# Patient Record
Sex: MALE | Race: WHITE | NOT HISPANIC OR LATINO
[De-identification: names, ages, dates, MRNs, and addresses within clinical notes are randomized per-mention and may not be internally consistent; named-entity substitution may affect disease eponyms.]

---

## 2022-05-05 ENCOUNTER — NON-APPOINTMENT (OUTPATIENT)
Age: 82
End: 2022-05-05

## 2022-05-15 ENCOUNTER — NON-APPOINTMENT (OUTPATIENT)
Age: 82
End: 2022-05-15

## 2022-05-24 ENCOUNTER — NON-APPOINTMENT (OUTPATIENT)
Age: 82
End: 2022-05-24

## 2022-05-30 ENCOUNTER — NON-APPOINTMENT (OUTPATIENT)
Age: 82
End: 2022-05-30

## 2022-07-07 ENCOUNTER — TRANSCRIPTION ENCOUNTER (OUTPATIENT)
Age: 82
End: 2022-07-07

## 2022-07-07 ENCOUNTER — APPOINTMENT (OUTPATIENT)
Dept: PAIN MANAGEMENT | Facility: CLINIC | Age: 82
End: 2022-07-07

## 2022-07-07 VITALS
WEIGHT: 200 LBS | SYSTOLIC BLOOD PRESSURE: 132 MMHG | DIASTOLIC BLOOD PRESSURE: 76 MMHG | HEART RATE: 85 BPM | HEIGHT: 73 IN | BODY MASS INDEX: 26.51 KG/M2

## 2022-07-07 DIAGNOSIS — Z87.891 PERSONAL HISTORY OF NICOTINE DEPENDENCE: ICD-10-CM

## 2022-07-07 DIAGNOSIS — Z78.9 OTHER SPECIFIED HEALTH STATUS: ICD-10-CM

## 2022-07-07 DIAGNOSIS — Z86.79 PERSONAL HISTORY OF OTHER DISEASES OF THE CIRCULATORY SYSTEM: ICD-10-CM

## 2022-07-07 DIAGNOSIS — Z87.19 PERSONAL HISTORY OF OTHER DISEASES OF THE DIGESTIVE SYSTEM: ICD-10-CM

## 2022-07-07 DIAGNOSIS — Z87.39 PERSONAL HISTORY OF OTHER DISEASES OF THE MUSCULOSKELETAL SYSTEM AND CONNECTIVE TISSUE: ICD-10-CM

## 2022-07-07 DIAGNOSIS — Z85.9 PERSONAL HISTORY OF MALIGNANT NEOPLASM, UNSPECIFIED: ICD-10-CM

## 2022-07-07 DIAGNOSIS — N40.0 BENIGN PROSTATIC HYPERPLASIA WITHOUT LOWER URINARY TRACT SYMPMS: ICD-10-CM

## 2022-07-07 DIAGNOSIS — Z86.39 PERSONAL HISTORY OF OTHER ENDOCRINE, NUTRITIONAL AND METABOLIC DISEASE: ICD-10-CM

## 2022-07-07 PROBLEM — Z00.00 ENCOUNTER FOR PREVENTIVE HEALTH EXAMINATION: Status: ACTIVE | Noted: 2022-07-07

## 2022-07-07 PROCEDURE — 99213 OFFICE O/P EST LOW 20 MIN: CPT

## 2022-07-07 RX ORDER — GABAPENTIN 100 MG/1
100 CAPSULE ORAL
Qty: 60 | Refills: 0 | Status: ACTIVE | COMMUNITY
Start: 2022-07-07 | End: 1900-01-01

## 2022-07-07 RX ORDER — BACLOFEN 10 MG/1
10 TABLET ORAL TWICE DAILY
Qty: 28 | Refills: 0 | Status: ACTIVE | COMMUNITY
Start: 2022-07-07 | End: 1900-01-01

## 2022-07-07 NOTE — HISTORY OF PRESENT ILLNESS
[FreeTextEntry1] : HISTORY OF PRESENT ILLNESS: Mr. Hauser is a 81 year old male complaining of lower back pain. He states the pain is located on the right side and travels into his right lower extremity. He states the pain is present daily and worse with any sort of activity. He states at rest the pain is minimal.\par \par Of note, he recently underwent a ACDF in November of 2020 after he sustained a fall and was found to have a cord injury for which emergent ACDF was performed.\par \par Overall, the pain started after no inciting event. The patient has had this pain for 2 months. Patient describes the pain as severe. During the last month the pain has been nearly constant with symptoms worsening no typical pattern. Pain described as sharp. Patient has weakness in the lower extremities. Pain is increased with standing, walking, exercising. Pain is decreased with lying down, sitting, relaxing. Pain is not changed with coughing/sneezing and bowel movement. Bowel or bladder habits have not changed.\par \par TODAY: The reason for the visit is a revisit encounter regarding his lower back pain. Since his last visit, he continues to have lower back pain, primarily at the midline. He does have radiating pain into the right lower extremity.  He did undergo a right L5-S1 SNRI x2 followed by a caudal injections x2 in May.  With the injections, he noted very little relief of his pain,   20-30% relief.  He continues to have right-sided lower back pain radiating into the right lower extremity with stiffness and pain.  Pain is alleviated while sitting however it is quite bothersome with increased activities such as prolonged walking or standing.\par \par  Since he has completed four epidural injections, I will hold off any further injections.

## 2022-07-07 NOTE — DATA REVIEWED
[FreeTextEntry1] : MRI L spine 8/16/2021: Extruded disc herniation L1-2 anterolaterally on the left compressing the left lateral recess of the thecal sac and exiting left 2nd lumbar roots. Small disc herniation is seen within the right intervertebral foramen of L5-S1 compressing the exiting right 5th lumbar root within the foramen. Diffuse annular bulge and associated osteophytic ridge at L4-5 mildly compresses the thecal sac. Small diffuse annular bulge L3-4.\par \par X-ray of the lumbar spine taken on 07/13/2021 showed mild dextroscoliosis of the thoracolumbar junction increase in the slightly in severity. At T12-L1, L1-2 and L2-3 and L3-4 marked spondylosis observed. At L4-5, marked disc narrowing, marked spondylosis the marked facet arthropathy are observed. Slight anterior listhesis of L4 has appeared since early examination. At L5-S1, marked disc narrowing, marked spondylosis and mild facet arthropathy. No lumbar compression fractures identified. 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 Prostate seed implants noted.

## 2022-07-21 NOTE — ASSESSMENT
[FreeTextEntry1] : 82-year-old male presenting with lower back pain. He has trialed and failed physical therapy with no significant improvement. He also underwent a right L5-S1 SNRI x2 along with caudal injections x2 with no significant relief. At this time, we will hold off any further injections and will trial him on Gabapentin 100mg to be titrated 2 tabs QHS as well as baclofen 5mg 1-2 tabs QHS, 2 week supply. He will follow up in 4-6 weeks for reassessment  I have explained the findings to the patient and all questions have been answered.
no

## 2022-08-08 ENCOUNTER — APPOINTMENT (OUTPATIENT)
Dept: PAIN MANAGEMENT | Facility: CLINIC | Age: 82
End: 2022-08-08

## 2022-08-08 VITALS
DIASTOLIC BLOOD PRESSURE: 94 MMHG | HEART RATE: 82 BPM | HEIGHT: 73 IN | SYSTOLIC BLOOD PRESSURE: 154 MMHG | BODY MASS INDEX: 26.51 KG/M2 | WEIGHT: 200 LBS

## 2022-08-08 PROCEDURE — 99214 OFFICE O/P EST MOD 30 MIN: CPT

## 2022-08-08 NOTE — PHYSICAL EXAM
[de-identified] : Constitutional\par GENERAL APPEARANCE OF PATIENT IS WELL DEVELOPED, WELL NOURISHED, BODY HABITUS NORMAL, WELL GROOMED, NO DEFORMITIES NOTED. \par \par Head\par -          Atraumatic and Normocephalic \par \par Eyes, Nose, and Throat:\par -          External inspection of ears and nose are normal overall without scars, lesions, or masses noted\par -          Assessment of hearing is normal\par \par Neck\par -          Examination of neck shows no masses, overall appearance is normal, neck is symmetric, tracheal position is midline, no crepitus is noted\par -          Examination of thyroid shows no enlargement, tenderness or masses\par \par Respiratory\par -          Assessment of respiratory effort shows no intercostal retractions, no use of accessory muscles, unlabored breathing, and normal diaphragmatic movement.\par \par Cardiovascular\par -          Examination of extremities show no edema or varicosities\par \par Musculoskeletal\par -           Inspection and palpation of digits and nails shows no clubbing, cyanosis, nodules, drainage, fluctuance, petechiae\par \par 1)         Spine- tenderness over the bilateral sacroiliac joints. \par \par 2)         Neck- inspection and palpation shows no misalignment, asymmetry, crepitation, defects, tenderness, masses, effusions. ROM is normal without crepitation or contracture. No instability or subluxation or laxity is noted. No abnormal movements.\par \par 3)         RUE- inspection and palpation shows no misalignment, asymmetry, crepitation, defects, tenderness, masses, effusions. ROM is normal without crepitation or contracture. No instability or subluxation or laxity is noted. No abnormal movements.\par \par 4)         LUE-inspection and palpation shows no misalignment, asymmetry, crepitation, defects, tenderness, masses, effusions. ROM is normal without crepitation or contracture. No instability or subluxation or laxity is noted. No abnormal movements.\par \par 5)         RLE- inspection and palpation shows no misalignment, asymmetry, crepitation, defects, tenderness, masses, effusions. ROM is normal without crepitation or contracture. No instability or subluxation or laxity is noted. No abnormal movements.\par \par 6)         LLE-inspection and palpation shows no misalignment, asymmetry, crepitation, defects, tenderness, masses, effusions. ROM is normal without crepitation or contracture. No instability or subluxation or laxity is noted. No abnormal movements.\par \par Skin\par -           Inspection of skin and subcutaneous tissue shows no rashes, lesions or ulcers\par -           Palpation of skin and subcutaneous tissue shows no rashes, no indurations, subcutaneous nodules or tightening.\par \par  Abdomen\par -           Soft; Non-tender\par \par Neurologic\par -           CN 2-12 are grossly intact\par -           No sensory or motor deficits in the upper and lower extremities\par -           Adequate strength in upper and lower extremities\par \par Psychiatric\par -           Patients judgment and insight are intact\par -           Oriented to time, place and person\par -           Recent and remote memory intact.\par

## 2022-08-08 NOTE — DATA REVIEWED
[FreeTextEntry1] : MRI L spine 8/16/2021: Extruded disc herniation L1-2 anterolaterally on the left compressing the left lateral recess of the thecal sac and exiting left 2nd lumbar roots. Small disc herniation is seen within the right intervertebral foramen of L5-S1 compressing the exiting right 5th lumbar root within the foramen. Diffuse annular bulge and associated osteophytic ridge at L4-5 mildly compresses the thecal sac. Small diffuse annular bulge L3-4.\par \par X-ray of the lumbar spine taken on 07/13/2021 showed mild dextroscoliosis of the thoracolumbar junction increase in the slightly in severity. At T12-L1, L1-2 and L2-3 and L3-4 marked spondylosis observed. At L4-5, marked disc narrowing, marked spondylosis the marked facet arthropathy are observed. Slight anterior listhesis of L4 has appeared since early examination. At L5-S1, marked disc narrowing, marked spondylosis and mild facet arthropathy. No lumbar compression fractures identified. 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 Prostate seed implants noted.\par \par SOAPP: Scored a 0 , low risk.\par  \par [place] REGISTRY: Reviewed .\par  \par UDS: No data obtained today. \par  \par Medications that trigger a UDS: Benzodiazepines (Ativan, Xanax, Valium) etc, Barbiturates, Narcotics (Avinza, Butrans, hydrocodone, Codeine, Maru, Methadone, Morphine, MS Contin, Opana, oxycodone, Oxycontin, Suboxone etc), Pregabalin (Lyrica), Tramadol (Ultacet, Utram etc), Tapentadol, (Nucynta) and Elist Drugs (cocaine, THC, Etc.)\par  \par Risk factors: Bipolar Illness, positive for any an illicit drugs, history of any ETOH and drug abuse, any signs of diversion, Sharing Meds, selling meds. Non consistent [place] drug reporting and above 120meq of morphine\par  \par Low risk: Patient has combination of a low risk SOAP and no risk factors. UDS would be repeated randomly every quarter\par \par \par

## 2022-08-08 NOTE — ASSESSMENT
[FreeTextEntry1] : 82 year old male presenting with pain over his bilateral sacroiliac joints. He would like to hold off on any injections at this time. He will follow up in 2 months for reassessment. I have explained the findings to the patient and all questions have been answered.\par \par No medications were given at todays visit.\par \par No interventions ordered today.\par \par We encourage a home exercise program, stressing walking and strengthening of the core. We have recommended exercises such as the modified plank, Leonardo exercise, elliptical , recumbent bike, as well as shoulder griddle strengthening. We discussed recreational activities such as swimming, Hira Chi and yoga. Use things that heat like hot shower or icy heat before rehab and exercising and at the beginning of the day, and ice (ice in a bag never directly on the skin) after activity and at the end of the day.\par \par A total of 33 minutes was spent on this visit, reviewing previous notes/consultations/imaging, counseling the patient on appropriate biomechanics impacting the pain, ordering tests if needed, refilling meds if needed, and documenting the findings in the note.\par \par Entered by Brandy Titus, acting as scribe for Dr. Ignacio.\par  \par The documentation recorded by the scribe, in my presence, accurately reflects the service I personally performed, and the decisions made by me with my edits as appropriate.\par  \par Best Regards, \par Mandeep Ignacio MD \par Board Certified, Anesthesiology \par Board Certified, Pain Medicine\par \par

## 2022-08-08 NOTE — HISTORY OF PRESENT ILLNESS
[FreeTextEntry1] : HISTORY OF PRESENT ILLNESS: Mr. Hauser is a 81 year old male complaining of lower back pain. He states the pain is located on the right side and travels into his right lower extremity. He states the pain is present daily and worse with any sort of activity. He states at rest the pain is minimal.\par \par Of note, he recently underwent a ACDF in November of 2020 after he sustained a fall and was found to have a cord injury for which emergent ACDF was performed.\par \par Overall, the pain started after no inciting event. The patient has had this pain for 2 months. Patient describes the pain as severe. During the last month the pain has been nearly constant with symptoms worsening no typical pattern. Pain described as sharp. Patient has weakness in the lower extremities. Pain is increased with standing, walking, exercising. Pain is decreased with lying down, sitting, relaxing. Pain is not changed with coughing/sneezing and bowel movement. Bowel or bladder habits have not changed.\par \par TODAY: Since last visit, he continues with ongoing lower back pain. He states the pain is in the lower lumbar spine into the buttock area. He states the pain is worse with walking. He states he gets out of breathe during walking secondary to the pain. He states he has signfiicant stiffness in his quads when standing up or walking. He currently rates the pain at a 8/10 on the pain scale. He states the pain has been constant. He states this pain signficiantly limits his ADLS.\par \par

## 2022-08-08 NOTE — PHYSICAL EXAM
[de-identified] : Constitutional\par GENERAL APPEARANCE OF PATIENT IS WELL DEVELOPED, WELL NOURISHED, BODY HABITUS NORMAL, WELL GROOMED, NO DEFORMITIES NOTED. \par \par Head\par -          Atraumatic and Normocephalic \par \par Eyes, Nose, and Throat:\par -          External inspection of ears and nose are normal overall without scars, lesions, or masses noted\par -          Assessment of hearing is normal\par \par Neck\par -          Examination of neck shows no masses, overall appearance is normal, neck is symmetric, tracheal position is midline, no crepitus is noted\par -          Examination of thyroid shows no enlargement, tenderness or masses\par \par Respiratory\par -          Assessment of respiratory effort shows no intercostal retractions, no use of accessory muscles, unlabored breathing, and normal diaphragmatic movement.\par \par Cardiovascular\par -          Examination of extremities show no edema or varicosities\par \par Musculoskeletal\par -           Inspection and palpation of digits and nails shows no clubbing, cyanosis, nodules, drainage, fluctuance, petechiae\par \par 1)         Spine- tenderness over the bilateral sacroiliac joints. \par \par 2)         Neck- inspection and palpation shows no misalignment, asymmetry, crepitation, defects, tenderness, masses, effusions. ROM is normal without crepitation or contracture. No instability or subluxation or laxity is noted. No abnormal movements.\par \par 3)         RUE- inspection and palpation shows no misalignment, asymmetry, crepitation, defects, tenderness, masses, effusions. ROM is normal without crepitation or contracture. No instability or subluxation or laxity is noted. No abnormal movements.\par \par 4)         LUE-inspection and palpation shows no misalignment, asymmetry, crepitation, defects, tenderness, masses, effusions. ROM is normal without crepitation or contracture. No instability or subluxation or laxity is noted. No abnormal movements.\par \par 5)         RLE- inspection and palpation shows no misalignment, asymmetry, crepitation, defects, tenderness, masses, effusions. ROM is normal without crepitation or contracture. No instability or subluxation or laxity is noted. No abnormal movements.\par \par 6)         LLE-inspection and palpation shows no misalignment, asymmetry, crepitation, defects, tenderness, masses, effusions. ROM is normal without crepitation or contracture. No instability or subluxation or laxity is noted. No abnormal movements.\par \par Skin\par -           Inspection of skin and subcutaneous tissue shows no rashes, lesions or ulcers\par -           Palpation of skin and subcutaneous tissue shows no rashes, no indurations, subcutaneous nodules or tightening.\par \par  Abdomen\par -           Soft; Non-tender\par \par Neurologic\par -           CN 2-12 are grossly intact\par -           No sensory or motor deficits in the upper and lower extremities\par -           Adequate strength in upper and lower extremities\par \par Psychiatric\par -           Patients judgment and insight are intact\par -           Oriented to time, place and person\par -           Recent and remote memory intact.\par

## 2022-10-05 ENCOUNTER — NON-APPOINTMENT (OUTPATIENT)
Age: 82
End: 2022-10-05

## 2022-12-14 ENCOUNTER — APPOINTMENT (OUTPATIENT)
Dept: PAIN MANAGEMENT | Facility: CLINIC | Age: 82
End: 2022-12-14

## 2022-12-14 VITALS — BODY MASS INDEX: 26.51 KG/M2 | WEIGHT: 200 LBS | HEIGHT: 73 IN

## 2022-12-14 DIAGNOSIS — M47.816 SPONDYLOSIS W/OUT MYELOPATHY OR RADICULOPATHY, LUMBAR REGION: ICD-10-CM

## 2022-12-14 PROCEDURE — 99214 OFFICE O/P EST MOD 30 MIN: CPT

## 2022-12-14 NOTE — DISCUSSION/SUMMARY
[de-identified] : 82-year-old male with persistent lower back pain despite conservative treatments. He will undergo updated imaging studies including an MRI of the lumbar spine in addition to flexion-extension x-rays. He will see Dr. Nance afterwards. Patient will follow up at our office as needed for now.\par \par Clair Joshi PA-C\par Mandeep Ignacio MD\par

## 2022-12-14 NOTE — HISTORY OF PRESENT ILLNESS
[FreeTextEntry1] : HISTORY OF PRESENT ILLNESS: Mr. Hauser is an 82 year old male complaining of lower back pain. He states the pain is located on the right side and travels into his right lower extremity. He states the pain is present daily and worse with any sort of activity. He states at rest the pain is minimal.\par \par Of note, he recently underwent a ACDF in November of 2020 after he sustained a fall and was found to have a cord injury for which emergent ACDF was performed.\par \par Overall, the pain started after no inciting event. The patient has had this pain for 2 months. Patient describes the pain as severe. During the last month the pain has been nearly constant with symptoms worsening no typical pattern. Pain described as sharp. Patient has weakness in the lower extremities. Pain is increased with standing, walking, exercising. Pain is decreased with lying down, sitting, relaxing. Pain is not changed with coughing/sneezing and bowel movement. Bowel or bladder habits have not changed.\par \par TODAY: He presents for a revisit appointment. He continues with ongoing lower back pain. Pain is worse with walking.  When sitting, his pain resolves. He states he gets out of breathe when walking secondary to the pain. He has stiffness in his quads but denies true radiculopathy. He currently rates the pain at a 8/10 on the pain scale. He states this pain significantly limits his ADL's. He has trialed PT and epidural injections with minimal relief. He has taken a muscle relaxer in the past which did not help. He has seen a neurosurgeon in the past, but wishes to have a 2nd opinion. I will refer him to Dr. Nance.

## 2022-12-14 NOTE — PHYSICAL EXAM
[de-identified] : Constitutional\par GENERAL APPEARANCE OF PATIENT IS WELL DEVELOPED, WELL NOURISHED, BODY HABITUS NORMAL, WELL GROOMED, NO DEFORMITIES NOTED. \par \par Head\par -          Atraumatic and Normocephalic \par \par Eyes, Nose, and Throat:\par -          External inspection of ears and nose are normal overall without scars, lesions, or masses noted\par -          Assessment of hearing is normal\par \par Neck\par -          Examination of neck shows no masses, overall appearance is normal, neck is symmetric, tracheal position is midline, no crepitus is noted\par -          Examination of thyroid shows no enlargement, tenderness or masses\par \par Respiratory\par -          Assessment of respiratory effort shows no intercostal retractions, no use of accessory muscles, unlabored breathing, and normal diaphragmatic movement.\par \par Cardiovascular\par -          Examination of extremities show no edema or varicosities\par \par Musculoskeletal\par -           Inspection and palpation of digits and nails shows no clubbing, cyanosis, nodules, drainage, fluctuance, petechiae\par \par 1)         Spine- tenderness over the bilateral sacroiliac joints. \par \par 2)         Neck- inspection and palpation shows no misalignment, asymmetry, crepitation, defects, tenderness, masses, effusions. ROM is normal without crepitation or contracture. No instability or subluxation or laxity is noted. No abnormal movements.\par \par 3)         RUE- inspection and palpation shows no misalignment, asymmetry, crepitation, defects, tenderness, masses, effusions. ROM is normal without crepitation or contracture. No instability or subluxation or laxity is noted. No abnormal movements.\par \par 4)         LUE-inspection and palpation shows no misalignment, asymmetry, crepitation, defects, tenderness, masses, effusions. ROM is normal without crepitation or contracture. No instability or subluxation or laxity is noted. No abnormal movements.\par \par 5)         RLE- inspection and palpation shows no misalignment, asymmetry, crepitation, defects, tenderness, masses, effusions. ROM is normal without crepitation or contracture. No instability or subluxation or laxity is noted. No abnormal movements.\par \par 6)         LLE-inspection and palpation shows no misalignment, asymmetry, crepitation, defects, tenderness, masses, effusions. ROM is normal without crepitation or contracture. No instability or subluxation or laxity is noted. No abnormal movements.\par \par Skin\par -           Inspection of skin and subcutaneous tissue shows no rashes, lesions or ulcers\par -           Palpation of skin and subcutaneous tissue shows no rashes, no indurations, subcutaneous nodules or tightening.\par \par  Abdomen\par -           Soft; Non-tender\par \par Neurologic\par -           CN 2-12 are grossly intact\par -           No sensory or motor deficits in the upper and lower extremities\par -           Adequate strength in upper and lower extremities\par \par Psychiatric\par -           Patients judgment and insight are intact\par -           Oriented to time, place and person\par -           Recent and remote memory intact.\par

## 2022-12-15 ENCOUNTER — APPOINTMENT (OUTPATIENT)
Dept: RADIOLOGY | Facility: CLINIC | Age: 82
End: 2022-12-15

## 2022-12-15 ENCOUNTER — RESULT CHARGE (OUTPATIENT)
Age: 82
End: 2022-12-15

## 2022-12-20 ENCOUNTER — APPOINTMENT (OUTPATIENT)
Dept: MRI IMAGING | Facility: CLINIC | Age: 82
End: 2022-12-20

## 2022-12-20 PROCEDURE — 72148 MRI LUMBAR SPINE W/O DYE: CPT | Mod: MH

## 2023-01-30 ENCOUNTER — APPOINTMENT (OUTPATIENT)
Dept: NEUROSURGERY | Facility: CLINIC | Age: 83
End: 2023-01-30
Payer: MEDICARE

## 2023-01-30 DIAGNOSIS — M54.16 RADICULOPATHY, LUMBAR REGION: ICD-10-CM

## 2023-01-30 DIAGNOSIS — M54.50 LOW BACK PAIN, UNSPECIFIED: ICD-10-CM

## 2023-01-30 PROCEDURE — 99204 OFFICE O/P NEW MOD 45 MIN: CPT

## 2023-01-30 NOTE — DISCUSSION/SUMMARY
[de-identified] : Mr. Hauser is an 82 year old male complaining of lower back pain. He states the pain is located in the midline lumbar spine with radicular features into the anterior bilateral thighs. No relief of symptoms with physical therapy or injection therapy. He does see an outside Neurosurgeon in [place] and is here for a second opinion. I discussed with him at length about a unilateral approach from the right micro decompressive laminectomy L4-5, L5-S1 and possible L3-4 vs decompressive laminectomy at L4-5 and L5-S1 with interlaminar stabilization. He wishes to discuss this over with his family and return for follow-up if needed. He may also return to speak to his outside neurosurgeon. He may follow-back when ready.\par \par I, Markos Mckeon, attest that this documentation has been prepared under the direction and in the presence of Provider Rahul Nance MD\par  \par Thank you for allowing me to assist in the care of this patient. \par  \par Rahul Nance MD\par \par Board Certified , Neurosurgeon\par \par

## 2023-01-30 NOTE — PHYSICAL EXAM
[de-identified] : Slight Kyphotic Gait \par Positive shopping cart sign\par Pain upon palpation of the lumbar spine, L5-S1 level  [de-identified] : MRI L/S 12/20/22: Mild S scoliosis with rotary component and mild straightening of sagittal lordosis.\par Multilevel spondylosis with prominent prevertebral osteophytes at multiple levels as well as multiple small sacral Tarlov cysts.\par L5-S1 severe chronic disc degeneration, shallow central right central and right foraminal disc herniation impinging on the right S1 root and foraminal L5 roots. L4-5 right central focal disc herniation extrudes superiorly to the inferior endplate of L4 on the right.\par \par X-ray L/S 12/22: Lumbar spondylosis affects every disc space level of the lumbar spine. A mild dextroscoliosis of the upper lumbar\par spine is present. A slight degenerative retrolisthesis of L2 on L3 is seen.

## 2023-01-30 NOTE — HISTORY OF PRESENT ILLNESS
[de-identified] : Mr. Hauser is an 82 year old male complaining of lower back pain. He states the pain is located in the midline lumbar spine with radicular features into the anterior bilateral thighs.  He states the pain is present daily and worse with any sort of activity. He was referred for neurosurgical consult by our pain management office. Pain is worse with walking. When sitting, his pain resolves. He has trialed physical therapy without any relief as well as a lumbar epidural injection by pain management without any relief. He continues to complaint of midline lower back pain at the L5-S1 level when he stands and walks for long periods of time. He does walk with somewhat of a kyphotic posture. He notes anterior thigh tightness bilaterally when he walks and stands, along with bilateral feet paraesthesia in the lateral aspect. When he sits and lays down he denies pain. Patient denies urinary incontinence. History of prostate cancer. \par \par Of note, he recently underwent a ACDF in November of 2020 after he sustained a  fall and was found to have a cord injury for which emergent ACDF was performed. This was completed by a surgeon in [facility].

## 2023-06-29 ENCOUNTER — INPATIENT (INPATIENT)
Facility: HOSPITAL | Age: 83
LOS: 1 days | Discharge: HOME | DRG: 309 | End: 2023-07-01
Attending: INTERNAL MEDICINE | Admitting: PEDIATRICS
Payer: MEDICARE

## 2023-06-29 VITALS
OXYGEN SATURATION: 96 % | RESPIRATION RATE: 20 BRPM | TEMPERATURE: 98 F | DIASTOLIC BLOOD PRESSURE: 74 MMHG | SYSTOLIC BLOOD PRESSURE: 127 MMHG | HEART RATE: 89 BPM

## 2023-06-29 DIAGNOSIS — I47.1 SUPRAVENTRICULAR TACHYCARDIA: ICD-10-CM

## 2023-06-29 DIAGNOSIS — Z98.1 ARTHRODESIS STATUS: Chronic | ICD-10-CM

## 2023-06-29 LAB
ALBUMIN SERPL ELPH-MCNC: 3.9 G/DL — SIGNIFICANT CHANGE UP (ref 3.5–5.2)
ALP SERPL-CCNC: 104 U/L — SIGNIFICANT CHANGE UP (ref 30–115)
ALT FLD-CCNC: 23 U/L — SIGNIFICANT CHANGE UP (ref 0–41)
ANION GAP SERPL CALC-SCNC: 14 MMOL/L — SIGNIFICANT CHANGE UP (ref 7–14)
AST SERPL-CCNC: 40 U/L — SIGNIFICANT CHANGE UP (ref 0–41)
BASOPHILS # BLD AUTO: 0.02 K/UL — SIGNIFICANT CHANGE UP (ref 0–0.2)
BASOPHILS NFR BLD AUTO: 0.3 % — SIGNIFICANT CHANGE UP (ref 0–1)
BILIRUB SERPL-MCNC: 0.3 MG/DL — SIGNIFICANT CHANGE UP (ref 0.2–1.2)
BUN SERPL-MCNC: 17 MG/DL — SIGNIFICANT CHANGE UP (ref 10–20)
CALCIUM SERPL-MCNC: 9 MG/DL — SIGNIFICANT CHANGE UP (ref 8.4–10.5)
CHLORIDE SERPL-SCNC: 102 MMOL/L — SIGNIFICANT CHANGE UP (ref 98–110)
CO2 SERPL-SCNC: 19 MMOL/L — SIGNIFICANT CHANGE UP (ref 17–32)
CREAT SERPL-MCNC: 1.3 MG/DL — SIGNIFICANT CHANGE UP (ref 0.7–1.5)
EGFR: 55 ML/MIN/1.73M2 — LOW
EOSINOPHIL # BLD AUTO: 0.13 K/UL — SIGNIFICANT CHANGE UP (ref 0–0.7)
EOSINOPHIL NFR BLD AUTO: 2 % — SIGNIFICANT CHANGE UP (ref 0–8)
GLUCOSE SERPL-MCNC: 315 MG/DL — HIGH (ref 70–99)
HCT VFR BLD CALC: 36.2 % — LOW (ref 42–52)
HGB BLD-MCNC: 12.7 G/DL — LOW (ref 14–18)
IMM GRANULOCYTES NFR BLD AUTO: 0.8 % — HIGH (ref 0.1–0.3)
LYMPHOCYTES # BLD AUTO: 1.05 K/UL — LOW (ref 1.2–3.4)
LYMPHOCYTES # BLD AUTO: 16.3 % — LOW (ref 20.5–51.1)
MCHC RBC-ENTMCNC: 30.8 PG — SIGNIFICANT CHANGE UP (ref 27–31)
MCHC RBC-ENTMCNC: 35.1 G/DL — SIGNIFICANT CHANGE UP (ref 32–37)
MCV RBC AUTO: 87.9 FL — SIGNIFICANT CHANGE UP (ref 80–94)
MONOCYTES # BLD AUTO: 0.47 K/UL — SIGNIFICANT CHANGE UP (ref 0.1–0.6)
MONOCYTES NFR BLD AUTO: 7.3 % — SIGNIFICANT CHANGE UP (ref 1.7–9.3)
NEUTROPHILS # BLD AUTO: 4.73 K/UL — SIGNIFICANT CHANGE UP (ref 1.4–6.5)
NEUTROPHILS NFR BLD AUTO: 73.3 % — SIGNIFICANT CHANGE UP (ref 42.2–75.2)
NRBC # BLD: 0 /100 WBCS — SIGNIFICANT CHANGE UP (ref 0–0)
PLATELET # BLD AUTO: 143 K/UL — SIGNIFICANT CHANGE UP (ref 130–400)
PMV BLD: 12.2 FL — HIGH (ref 7.4–10.4)
POTASSIUM SERPL-MCNC: 5.8 MMOL/L — HIGH (ref 3.5–5)
POTASSIUM SERPL-SCNC: 5.8 MMOL/L — HIGH (ref 3.5–5)
PROT SERPL-MCNC: 5.8 G/DL — LOW (ref 6–8)
RBC # BLD: 4.12 M/UL — LOW (ref 4.7–6.1)
RBC # FLD: 13.1 % — SIGNIFICANT CHANGE UP (ref 11.5–14.5)
SODIUM SERPL-SCNC: 135 MMOL/L — SIGNIFICANT CHANGE UP (ref 135–146)
TROPONIN T SERPL-MCNC: 0.11 NG/ML — CRITICAL HIGH
WBC # BLD: 6.45 K/UL — SIGNIFICANT CHANGE UP (ref 4.8–10.8)
WBC # FLD AUTO: 6.45 K/UL — SIGNIFICANT CHANGE UP (ref 4.8–10.8)

## 2023-06-29 PROCEDURE — 83735 ASSAY OF MAGNESIUM: CPT

## 2023-06-29 PROCEDURE — 84484 ASSAY OF TROPONIN QUANT: CPT

## 2023-06-29 PROCEDURE — 36415 COLL VENOUS BLD VENIPUNCTURE: CPT

## 2023-06-29 PROCEDURE — 84439 ASSAY OF FREE THYROXINE: CPT

## 2023-06-29 PROCEDURE — 93010 ELECTROCARDIOGRAM REPORT: CPT

## 2023-06-29 PROCEDURE — 78452 HT MUSCLE IMAGE SPECT MULT: CPT

## 2023-06-29 PROCEDURE — A9500: CPT

## 2023-06-29 PROCEDURE — 93306 TTE W/DOPPLER COMPLETE: CPT

## 2023-06-29 PROCEDURE — 82962 GLUCOSE BLOOD TEST: CPT

## 2023-06-29 PROCEDURE — 85027 COMPLETE CBC AUTOMATED: CPT

## 2023-06-29 PROCEDURE — 93005 ELECTROCARDIOGRAM TRACING: CPT

## 2023-06-29 PROCEDURE — 71045 X-RAY EXAM CHEST 1 VIEW: CPT | Mod: 26

## 2023-06-29 PROCEDURE — 85025 COMPLETE CBC W/AUTO DIFF WBC: CPT

## 2023-06-29 PROCEDURE — 93017 CV STRESS TEST TRACING ONLY: CPT

## 2023-06-29 PROCEDURE — 84443 ASSAY THYROID STIM HORMONE: CPT

## 2023-06-29 PROCEDURE — 99285 EMERGENCY DEPT VISIT HI MDM: CPT | Mod: GC

## 2023-06-29 PROCEDURE — 80053 COMPREHEN METABOLIC PANEL: CPT

## 2023-06-29 PROCEDURE — 93010 ELECTROCARDIOGRAM REPORT: CPT | Mod: 77

## 2023-06-29 PROCEDURE — 71275 CT ANGIOGRAPHY CHEST: CPT

## 2023-06-29 PROCEDURE — 80048 BASIC METABOLIC PNL TOTAL CA: CPT

## 2023-06-29 PROCEDURE — 83036 HEMOGLOBIN GLYCOSYLATED A1C: CPT

## 2023-06-29 RX ORDER — PANTOPRAZOLE SODIUM 20 MG/1
40 TABLET, DELAYED RELEASE ORAL
Refills: 0 | Status: DISCONTINUED | OUTPATIENT
Start: 2023-06-29 | End: 2023-07-01

## 2023-06-29 RX ORDER — GLUCAGON INJECTION, SOLUTION 0.5 MG/.1ML
1 INJECTION, SOLUTION SUBCUTANEOUS ONCE
Refills: 0 | Status: DISCONTINUED | OUTPATIENT
Start: 2023-06-29 | End: 2023-07-01

## 2023-06-29 RX ORDER — METOPROLOL TARTRATE 50 MG
25 TABLET ORAL DAILY
Refills: 0 | Status: DISCONTINUED | OUTPATIENT
Start: 2023-06-29 | End: 2023-06-29

## 2023-06-29 RX ORDER — ACETAMINOPHEN 500 MG
650 TABLET ORAL EVERY 6 HOURS
Refills: 0 | Status: DISCONTINUED | OUTPATIENT
Start: 2023-06-29 | End: 2023-07-01

## 2023-06-29 RX ORDER — DEXTROSE 50 % IN WATER 50 %
25 SYRINGE (ML) INTRAVENOUS ONCE
Refills: 0 | Status: DISCONTINUED | OUTPATIENT
Start: 2023-06-29 | End: 2023-07-01

## 2023-06-29 RX ORDER — SODIUM CHLORIDE 9 MG/ML
1000 INJECTION, SOLUTION INTRAVENOUS
Refills: 0 | Status: DISCONTINUED | OUTPATIENT
Start: 2023-06-29 | End: 2023-07-01

## 2023-06-29 RX ORDER — LANOLIN ALCOHOL/MO/W.PET/CERES
3 CREAM (GRAM) TOPICAL AT BEDTIME
Refills: 0 | Status: DISCONTINUED | OUTPATIENT
Start: 2023-06-29 | End: 2023-07-01

## 2023-06-29 RX ORDER — TAMSULOSIN HYDROCHLORIDE 0.4 MG/1
0.4 CAPSULE ORAL
Refills: 0 | Status: DISCONTINUED | OUTPATIENT
Start: 2023-06-29 | End: 2023-07-01

## 2023-06-29 RX ORDER — INSULIN LISPRO 100/ML
VIAL (ML) SUBCUTANEOUS
Refills: 0 | Status: DISCONTINUED | OUTPATIENT
Start: 2023-06-29 | End: 2023-07-01

## 2023-06-29 RX ORDER — ENOXAPARIN SODIUM 100 MG/ML
40 INJECTION SUBCUTANEOUS EVERY 24 HOURS
Refills: 0 | Status: DISCONTINUED | OUTPATIENT
Start: 2023-06-29 | End: 2023-07-01

## 2023-06-29 RX ORDER — DEXTROSE 50 % IN WATER 50 %
12.5 SYRINGE (ML) INTRAVENOUS ONCE
Refills: 0 | Status: DISCONTINUED | OUTPATIENT
Start: 2023-06-29 | End: 2023-07-01

## 2023-06-29 RX ORDER — METOPROLOL TARTRATE 50 MG
25 TABLET ORAL
Refills: 0 | Status: DISCONTINUED | OUTPATIENT
Start: 2023-06-29 | End: 2023-07-01

## 2023-06-29 RX ORDER — DEXTROSE 50 % IN WATER 50 %
15 SYRINGE (ML) INTRAVENOUS ONCE
Refills: 0 | Status: DISCONTINUED | OUTPATIENT
Start: 2023-06-29 | End: 2023-07-01

## 2023-06-29 RX ORDER — ASPIRIN/CALCIUM CARB/MAGNESIUM 324 MG
81 TABLET ORAL DAILY
Refills: 0 | Status: DISCONTINUED | OUTPATIENT
Start: 2023-06-29 | End: 2023-07-01

## 2023-06-29 RX ORDER — ATORVASTATIN CALCIUM 80 MG/1
40 TABLET, FILM COATED ORAL AT BEDTIME
Refills: 0 | Status: DISCONTINUED | OUTPATIENT
Start: 2023-06-29 | End: 2023-07-01

## 2023-06-29 RX ADMIN — ATORVASTATIN CALCIUM 40 MILLIGRAM(S): 80 TABLET, FILM COATED ORAL at 22:48

## 2023-06-29 RX ADMIN — Medication 25 MILLIGRAM(S): at 19:15

## 2023-06-29 NOTE — H&P ADULT - NSHPPHYSICALEXAM_GEN_ALL_CORE
CONSTITUTIONAL: No acute distress, well-developed, well-groomed, AAOx3  PULMONARY: Clear to auscultation bilaterally; no wheezes, rales, or rhonchi  CARDIOVASCULAR: Regular rate and rhythm; no audible murmurs  GASTROINTESTINAL: Soft, non-tender, non-distended; bowel sounds present  MUSCULOSKELETAL: 2+ peripheral pulses; no LLE  NEUROLOGY: non-focal  SKIN: No rashes or lesions; warm and dry

## 2023-06-29 NOTE — H&P ADULT - HISTORY OF PRESENT ILLNESS
Case of 83-year-old male with PMHx of HTN, DM, DLD, Hx of  SVT follows with Dr. Motta presenting to the ED for evaluation of acute onset shortness of breath that began this morning.    Patient states he got out of bed and went to the bathroom and was having difficulty ambulating to bathroom secondary to shortness of breath. 911 called, pt in svt on ekg, give 6 and 12 adenosine, not converted, hypotensive, cardioverted with 100J and 100 ketamine. converted to sinus rhythm without complaints.    Otherwise denies any fever, chills, headache, changes in vision, cough, congestion, n/v/d, abd pain, constipation, urinary complaints, lower extremity pain/swelling. Case of 83-year-old male with PMHx of HTN, DM, DLD, GERD, prostate cancer (sp seed implant in 2008) Hx of  SVT follows with Dr. Motta presenting to the ED for evaluation of acute onset shortness of breath that began this morning.    Patient states he got out of bed and went to the bathroom and was having difficulty ambulating to bathroom secondary to shortness of breath at around 5am. He went back to sleep then woke up with the same complaint, 911 called.  No chest pain, no fall, no other complaints, fever, chills, headache, changes in vision, cough, congestion, n/v/d, abd pain, constipation, urinary complaints, lower extremity pain/swelling.    During transportation with EMT, he was in svt on ekg, given 6 and 12 adenosine, and did not convert, was  hypotensive, cardioverted with 100J and 100 ketamine. Converted to sinus rhythm without complaints.    No clear inciting event for such SVT. Case of 83-year-old male with PMHx of HTN, DM, DLD, GERD, prostate cancer (sp seed implant in 2008), pAfib, Hx of  SVT follows with Dr. Motta presenting to the ED for evaluation of acute onset shortness of breath that began this morning.    Patient states he got out of bed and went to the bathroom and was having difficulty ambulating to bathroom secondary to shortness of breath at around 5am. He went back to sleep then woke up with the same complaint, 911 called.  No chest pain, no fall, no other complaints, fever, chills, headache, changes in vision, cough, congestion, n/v/d, abd pain, constipation, urinary complaints, lower extremity pain/swelling.    During transportation with EMT, he was in svt on ekg, given 6 and 12 adenosine, and did not convert, was  hypotensive, cardioverted with 100J and 100 ketamine. Converted to sinus rhythm without complaints.    No clear inciting event for such SVT.

## 2023-06-29 NOTE — ED PROVIDER NOTE - OBJECTIVE STATEMENT
Patient is an 83-year-old male history of high blood pressure diabetes high cholesterol  not on any anticoagulation followed by Dr. Motta presenting to the ED for evaluation of acute onset shortness of breath that began this morning.  Patient states he got out of bed and went to the bathroom and was having difficulty ambulating to bathroom secondary to shortness of breath. 911 called, pt in svt on ekg, give 6 and 12 adenosine, not converted, hypotensive, cardioverted with 100J and 100 ketamine. convereted to sinus rhythm without complaints. Otherwise denies any fever, chills, headache, changes in vision, cough, congestion, n/v/d, abd pain, constipation, urinary complaints, lower extremity pain/swelling. Patient is an 83-year-old male history of high blood pressure diabetes high cholesterol svt followed by Dr. Motta presenting to the ED for evaluation of acute onset shortness of breath that began this morning.  Patient states he got out of bed and went to the bathroom and was having difficulty ambulating to bathroom secondary to shortness of breath. 911 called, pt in svt on ekg, give 6 and 12 adenosine, not converted, hypotensive, cardioverted with 100J and 100 ketamine. converted to sinus rhythm without complaints. Otherwise denies any fever, chills, headache, changes in vision, cough, congestion, n/v/d, abd pain, constipation, urinary complaints, lower extremity pain/swelling.

## 2023-06-29 NOTE — H&P ADULT - ASSESSMENT
Case of 83-year-old male with PMHx of HTN, DM, DLD, Hx of  SVT follows with Dr. Motta presenting to the ED for evaluation of acute onset shortness of breath that began this morning.    #SVT sp shock  - Patient found to have an SVT by EMT  - Sp Adenosine 6mg => 6mg => 12mg then became hypotensive, given ketamine and cardioverted  - EP consult      #Elevated troponin  - Likely due to the cardioversion  - No chest pain  - Repeat troponin    #HTN  - C/w    #DM  - No recent A1c, will order  - Hold home meds  - Insulin sliding scale for now    #DLD  - C/w     #DVT prophylaxis  #GI prophylaxis  #Activity  #Diet     Case of 83-year-old male with PMHx of HTN, DM, DLD, Hx of  SVT follows with Dr. Motta presenting to the ED for evaluation of acute onset shortness of breath that began this morning.    #SVT sp shock  #Hx of Afib  - Patient found to have an SVT by EMT  - Sp Adenosine 6mg => 12mg then became hypotensive, given ketamine and cardioverted  - No clear inciting event  - Patient says he has a history of A-fib, diagnosed > 15 years ago, was started on aspirin and metoprolol back then, no history of anticoagulation intake  - Currently in sinus rhythm with no symptoms  - C/w aspirin  - C/w metoprolol  - EP consult    #Elevated troponin  - Likely due to myocardial stunning after the cardioversion  - No chest pain, no ischemic changes on EKG  - Trend troponin    #Possible lung nodule  - CXR showing subcentimeter right basilar nodular densities, may represent calcified granulomas or lung nodules. Recommend chest CT follow-up.  - Can be done as OP    #HTN  - C/w metoprolol  - Hold enalapril due to elevated potassium (hemolyzed), may resume in AM if levels are acceptable    #DM  - No recent A1c, will order  - Hold home meds (metformin and glimepiride   - Insulin sliding scale for now    #DLD  - C/w statin    #Hx of prostate cancer  #BPH?  - Sp Palladium seeds implanted in 2008  - No chemo or radio  - c/w flomax 0.4 BID  - Fu OP    #GERD  - C/w protonix    #Hx of mechanical fall with surgical intervention: anterior discectomy with fusion of C4-C5-C6 in 2020  #Lumbar disc herniation  - Tylenol PRN  - No neurologic deficits  - Ambulates with no issues but has chronic pain on standing    #DVT prophylaxis: lovenox  #GI prophylaxis: protonix  #Activity: IAT  #Diet: DASH, diabetic       Case of 83-year-old male with PMHx of HTN, DM, DLD, Hx of  SVT follows with Dr. Motta presenting to the ED for evaluation of acute onset shortness of breath that began this morning.    #SVT sp shock  #Hx of Afib  - Patient found to have an SVT by EMT  - Sp Adenosine 6mg => 12mg then became hypotensive, given ketamine and cardioverted  - No clear inciting event  - Patient says he has a history of A-fib, diagnosed > 15 years ago, was started on aspirin and metoprolol back then, no history of anticoagulation intake  - Currently in sinus rhythm ( and RBBB with 1st degree AB block) with no symptoms  - C/w aspirin  - C/w metoprolol  - EP consult    #Elevated troponin  - Likely due to myocardial stunning after the cardioversion  - No chest pain, no ischemic changes on EKG  - Trend troponin    #Possible lung nodule  - CXR showing subcentimeter right basilar nodular densities, may represent calcified granulomas or lung nodules. Recommend chest CT follow-up.  - Can be done as OP    #HTN  - C/w metoprolol  - Hold enalapril due to elevated potassium (hemolyzed), may resume in AM if levels are acceptable    #DM  - No recent A1c, will order  - Hold home meds (metformin and glimepiride   - Insulin sliding scale for now    #DLD  - C/w statin    #Hx of prostate cancer  #BPH?  - Sp Palladium seeds implanted in 2008  - No chemo or radio  - c/w flomax 0.4 BID  - Fu OP    #GERD  - C/w protonix    #Hx of mechanical fall with surgical intervention: anterior discectomy with fusion of C4-C5-C6 in 2020  #Lumbar disc herniation  - Tylenol PRN  - No neurologic deficits  - Ambulates with no issues but has chronic pain on standing    #DVT prophylaxis: lovenox  #GI prophylaxis: protonix  #Activity: IAT  #Diet: DASH, diabetic       Case of 83-year-old male with PMHx of HTN, DM, DLD, Hx of  SVT follows with Dr. Motta presenting to the ED for evaluation of acute onset shortness of breath that began this morning.    #SVT sp shock  #Hx of Afib  - Patient found to have an SVT by EMT  - Sp Adenosine 6mg => 12mg then became hypotensive, given ketamine and cardioverted  - No clear inciting event  - Patient says he has a history of A-fib, diagnosed > 15 years ago, was started on aspirin and metoprolol back then, no history of anticoagulation intake  - Currently in sinus rhythm ( and RBBB with 1st degree AB block) with no symptoms  => Plan:  - C/w aspirin  - C/w metoprolol and increase to 25mg BID as part of suppressive therapy  - EP consult    #Elevated troponin  - Likely due to myocardial stunning after the cardioversion  - No chest pain, no ischemic changes on EKG  - Trend troponin    #Possible lung nodule  - CXR showing subcentimeter right basilar nodular densities, may represent calcified granulomas or lung nodules. Recommend chest CT follow-up.  - Can be done as OP    #HTN  - C/w metoprolol  - Hold enalapril due to elevated potassium (hemolyzed), may resume in AM if levels are acceptable    #DM  - No recent A1c, will order  - Hold home meds (metformin and glimepiride   - Insulin sliding scale for now    #DLD  - C/w statin    #Hx of prostate cancer  #BPH?  - Sp Palladium seeds implanted in 2008  - No chemo or radio  - c/w flomax 0.4 BID  - Fu OP    #GERD  - C/w protonix    #Hx of mechanical fall with surgical intervention: anterior discectomy with fusion of C4-C5-C6 in 2020  #Lumbar disc herniation  - Tylenol PRN  - No neurologic deficits  - Ambulates with no issues but has chronic pain on standing    #DVT prophylaxis: lovenox  #GI prophylaxis: protonix  #Activity: IAT  #Diet: DASH, diabetic       Case of 83-year-old male with PMHx of HTN, DM, DLD, Hx of  SVT follows with Dr. Motta presenting to the ED for evaluation of acute onset shortness of breath that began this morning.    #SVT sp shock  #Hx of Afib  - Patient found to have an SVT by EMT  - Sp Adenosine 6mg => 12mg then became hypotensive, given ketamine and cardioverted  - No clear inciting event  - Patient says he has a history of A-fib, diagnosed > 15 years ago, was started on aspirin and metoprolol back then, no history of anticoagulation intake  - Currently in sinus rhythm ( and RBBB with 1st degree AB block) with no symptoms  => Plan:  - C/w aspirin  - C/w metoprolol and increase to 25mg BID as part of suppressive therapy  - EP consult    #Elevated troponin  - Likely due to myocardial stunning after the cardioversion  - No chest pain, no ischemic changes on EKG  - Trend troponin    #Possible lung nodule  - CXR showing subcentimeter right basilar nodular densities, may represent calcified granulomas or lung nodules. Recommend chest CT follow-up.  - Can be done as OP    #Normocytic anemia  - No source of bleed  - Can get nutritional workup as OP    #CKD 3a (vs RONNI?)  - No previous levels  - Repeat Cr in AM  - Avoid nephrotoxic meds    #HTN  - C/w metoprolol  - Hold enalapril due to elevated potassium (hemolyzed), may resume in AM if levels are acceptable    #DM  - No recent A1c, will order  - Hold home meds (metformin and glimepiride   - Insulin sliding scale for now    #DLD  - C/w statin    #Hx of prostate cancer  #BPH?  - Sp Palladium seeds implanted in 2008  - No chemo or radio  - c/w flomax 0.4 BID  - Fu OP    #GERD  - C/w protonix    #Hx of mechanical fall with surgical intervention: anterior discectomy with fusion of C4-C5-C6 in 2020  #Lumbar disc herniation  - Tylenol PRN  - No neurologic deficits  - Ambulates with no issues but has chronic pain on standing    #DVT prophylaxis: lovenox  #GI prophylaxis: protonix  #Activity: IAT  #Diet: DASH, diabetic       Case of 83-year-old male with PMHx of HTN, DM, DLD, Hx of  SVT follows with Dr. Motta presenting to the ED for evaluation of acute onset shortness of breath that began this morning.    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 Recommend chest CT follow-up  - Ex-smoker, quit 45 years ago  - Can be done as OP    #Normocytic anemia  - Hb 12.7  - No source of bleed  - Can get nutritional workup as OP    #CKD 3a (vs RONNI?)  - No previous levels  - Repeat Cr in AM  - Avoid nephrotoxic meds    #HTN  - C/w metoprolol  - Hold enalapril due to elevated potassium (hemolyzed), may resume in AM if levels are acceptable    #DM  - No recent A1c, will order  - Hold home meds (metformin and glimepiride   - Insulin sliding scale for now    #DLD  - C/w statin    #Hx of prostate cancer  #BPH?  - Sp Palladium seeds implanted in 2008  - No chemo or radio  - c/w flomax 0.4 BID  - Fu OP    #GERD  - C/w protonix    #Hx of mechanical fall with surgical intervention: anterior discectomy with fusion of C4-C5-C6 in 2020  #Lumbar disc herniation  - Tylenol PRN  - No neurologic deficits  - Ambulates with no issues but has chronic pain on standing    #DVT prophylaxis: lovenox  #GI prophylaxis: protonix  #Activity: IAT  #Diet: DASH, diabetic       Case of 83-year-old male with PMHx of HTN, DM, DLD, Hx of  SVT follows with Dr. Motta presenting to the ED for evaluation of acute onset shortness of breath that began this morning.    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 Recommend chest CT follow-up  - Ex-smoker, quit 45 years ago  - Can be done as OP    #Normocytic anemia  - Hb 12.7  - No source of bleed  - Can get nutritional workup as OP    #CKD 3a (vs RONNI?)  - No previous levels  - Repeat Cr in AM  - Avoid nephrotoxic meds    #HTN  - C/w metoprolol  - Hold enalapril due to elevated potassium (hemolyzed), may resume in AM if levels are acceptable    #DM  - No recent A1c, will order  - Hold home meds (metformin and glimepiride   - Insulin sliding scale for now    #DLD  - C/w statin    #Hx of prostate cancer  #BPH?  - Sp Palladium seeds brachytherapy implanted in 2008  - No chemotherapy  - c/w flomax 0.4 BID  - Fu OP    #GERD  - C/w protonix    #Hx of mechanical fall with surgical intervention: anterior discectomy with fusion of C4-C5-C6 in 2020  #Lumbar disc herniation  - Tylenol PRN  - No neurologic deficits  - Ambulates with no issues but has chronic pain on standing    #DVT prophylaxis: lovenox  #GI prophylaxis: protonix  #Activity: IAT  #Diet: DASH, diabetic       Case of 83-year-old male with PMHx of HTN, DM, DLD, GERD, prostate cancer (sp seed implant in 2008), pAfib, Hx of  SVT follows with Dr. Motta presenting to the ED for evaluation of acute onset shortness of breath that began this morning.    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 Recommend chest CT follow-up  - Ex-smoker, quit 45 years ago  - Can be done as OP    #Normocytic anemia  - Hb 12.7  - No source of bleed  - Can get nutritional workup as OP    #CKD 3a (vs RONNI?)  - No previous levels  - Repeat Cr in AM  - Avoid nephrotoxic meds    #HTN  - C/w metoprolol  - Hold enalapril due to elevated potassium (hemolyzed), may resume in AM if levels are acceptable    #DM  - No recent A1c, will order  - Hold home meds (metformin and glimepiride   - Insulin sliding scale for now    #DLD  - C/w statin    #Hx of prostate cancer  #BPH?  - Sp Palladium seeds brachytherapy implanted in 2008  - No chemotherapy  - c/w flomax 0.4 BID  - Fu OP    #GERD  - C/w protonix    #Hx of mechanical fall with surgical intervention: anterior discectomy with fusion of C4-C5-C6 in 2020  #Lumbar disc herniation  - Tylenol PRN  - No neurologic deficits  - Ambulates with no issues but has chronic pain on standing    #DVT prophylaxis: lovenox  #GI prophylaxis: protonix  #Activity: IAT  #Diet: DASH, diabetic

## 2023-06-29 NOTE — ED ADULT NURSE REASSESSMENT NOTE - NS ED NURSE REASSESS COMMENT FT1
Received pt alert and orientedx4, family at bedside having conversation. Pt denies rapid heart rate, vital signs recorded, WNL. Pt is on RA at 97%, no respiratory distress. Safety in place.

## 2023-06-29 NOTE — H&P ADULT - NSICDXPASTMEDICALHX_GEN_ALL_CORE_FT
PAST MEDICAL HISTORY:  Diabetes mellitus     Hypertension     Paroxysmal atrial fibrillation     Prostate cancer

## 2023-06-29 NOTE — ED ADULT NURSE NOTE - OBJECTIVE STATEMENT
pt BIBEMS after feeling lightheaded, dizziness, nausea, HR on scene 220, adenosine given by EMS, BP was 70/40 then EMS cardioverted, given 100mg ketamine for pain. Pt currently AO4, sx resolved, denies CP, HR 79, NSR observed. cardiac monitoring continued.

## 2023-06-29 NOTE — ED PROVIDER NOTE - PROGRESS NOTE DETAILS
pk: pt resting comfortably in no acute distress, no complaints at this time. awaiting labs and cxr pk: labs and cxr reviewed, trop elevated to .11, will place in med tele for monitoring

## 2023-06-29 NOTE — H&P ADULT - NSHPLABSRESULTS_GEN_ALL_CORE
VITAL SIGNS: Last 24 Hours  T(C): 36.8 (29 Jun 2023 15:39), Max: 36.8 (29 Jun 2023 10:35)  T(F): 98.2 (29 Jun 2023 15:39), Max: 98.2 (29 Jun 2023 10:35)  HR: 67 (29 Jun 2023 15:39) (67 - 89)  BP: 122/69 (29 Jun 2023 15:39) (122/69 - 130/76)  BP(mean): --  RR: 18 (29 Jun 2023 15:39) (16 - 20)  SpO2: 98% (29 Jun 2023 15:39) (96% - 99%)    LABS:                        12.7   6.45  )-----------( 143      ( 29 Jun 2023 10:45 )             36.2     06-29    135  |  102  |  17  ----------------------------<  315<H>  5.8<H>   |  19  |  1.3    Ca    9.0      29 Jun 2023 10:45    TPro  5.8<L>  /  Alb  3.9  /  TBili  0.3  /  DBili  x   /  AST  40  /  ALT  23  /  AlkPhos  104  06-29      Urinalysis Basic - ( 29 Jun 2023 10:45 )    Color: x / Appearance: x / SG: x / pH: x  Gluc: 315 mg/dL / Ketone: x  / Bili: x / Urobili: x   Blood: x / Protein: x / Nitrite: x   Leuk Esterase: x / RBC: x / WBC x   Sq Epi: x / Non Sq Epi: x / Bacteria: x        Troponin T, Serum: 0.11 ng/mL *HH* (06-29-23 @ 10:45)

## 2023-06-29 NOTE — ED PROVIDER NOTE - CLINICAL SUMMARY MEDICAL DECISION MAKING FREE TEXT BOX
83-year-old male present to the ED for acute onset shortness of breath.  Patient was noted to be tachycardic in the field.  Given adenosine and cardioverted at 100 J.  Patient on evaluation here is resting comfortably.  Vitals reviewed by me noted to be within normal limits.  Physical exam is unremarkable.  EKG revealed first-degree AV block with right bundle branch block.  Labs reviewed by me, values noted to be within normal limits.  Unclear if patient's initial rhythm was SVT admit to telemetry.

## 2023-06-29 NOTE — H&P ADULT - NSHPSOCIALHISTORY_GEN_ALL_CORE
Ex-smoker (pipe), quit 45 years ago    Occasional alcohol drinker    Lives at home, ambulates without assisstance Ex-smoker (pipe), quit 45 years ago    Occasional alcohol drinker    Lives at home, ambulates without assistance

## 2023-06-29 NOTE — H&P ADULT - NSVTERISKASSESS_GEN_ALL_CORE FT
Medical Assessment Completed on: 29-Jun-2023 17:26 Detail Level: Detailed Render Post-Care Instructions In Note?: no Consent: The patient's consent was obtained including but not limited to risks of crusting, scabbing, blistering, scarring, darker or lighter pigmentary change, recurrence, incomplete removal and infection. Duration Of Freeze Thaw-Cycle (Seconds): 3 Aperture Size (Optional): A Number Of Freeze-Thaw Cycles: 2 freeze-thaw cycles Post-Care Instructions: I reviewed with the patient in detail post-care instructions. Patient is to wear sunprotection, and avoid picking at any of the treated lesions. Pt may apply Vaseline to crusted or scabbing areas.

## 2023-06-29 NOTE — H&P ADULT - ATTENDING COMMENTS
HPI:  Case of 83-year-old male with PMHx of HTN, DM, DLD, GERD, prostate cancer (sp seed implant in 2008) Hx of  SVT follows with Dr. Motta presenting to the ED for evaluation of acute onset shortness of breath that began this morning.    Patient states he got out of bed and went to the bathroom and was having difficulty ambulating to bathroom secondary to shortness of breath at around 5am. He went back to sleep then woke up with the same complaint, 911 called.  No chest pain, no fall, no other complaints, fever, chills, headache, changes in vision, cough, congestion, n/v/d, abd pain, constipation, urinary complaints, lower extremity pain/swelling.    During transportation with EMT, he was in svt on ekg, given 6 and 12 adenosine, and did not convert, was  hypotensive, cardioverted with 100J and 100 ketamine. Converted to sinus rhythm without complaints.    No clear inciting event for such SVT. (29 Jun 2023 16:09)    REVIEW OF SYSTEMS: see cc/HPI   CONSTITUTIONAL: No weakness, fevers or chills  EYES/ENT: No visual changes;  No vertigo or throat pain   NECK: No pain or stiffness  RESPIRATORY: No cough, wheezing, hemoptysis; No shortness of breath  CARDIOVASCULAR: No chest pain or palpitations  GASTROINTESTINAL: No abdominal or epigastric pain. No nausea, vomiting, or hematemesis; No diarrhea or constipation. No melena or hematochezia.  GENITOURINARY: No dysuria, frequency or hematuria  NEUROLOGICAL: No numbness or weakness  SKIN: No itching, rashes    Physical Exam:   General: WN/WD NAD  Neurology: A&Ox3, nonfocal, follows commands  Eyes: PERRLA/ EOMI  ENT/Neck: Neck supple, trachea midline, No JVD  Respiratory: CTA B/L, No wheezing, rales, rhonchi  CV: Normal rate regular rhythm, S1S2, no murmurs, rubs or gallops  Abdominal: Soft, NT, ND +BS,   Extremities: No edema, + peripheral pulses  Skin: No Rashes, Hematoma, Ecchymosis    A/p  SVT s/p DCCV   H/o A.fib ( paroxysmal)  Elevated trop s/p cardioversion   -admit to tele   -EP eval   -serial CE and EKG   -agree w/ BBlocker and A/c if recommended by EP   -ASA / statin     R basial nodular density   -CT chest / pulm eval - can be OP   -smoking cessation     DM type II   Dyslipidemia   - statin   -F/S monitoring and   Anemia - normocytic     CKD IIIa  - serial monitoring     H/o prostate cancer s/p RT/brachytherapy   -Flomax     GERD- PPI HPI:  Case of 83-year-old male with PMHx of HTN, DM, DLD, GERD, prostate cancer (sp seed implant in 2008) Hx of  SVT follows with Dr. Motta presenting to the ED for evaluation of acute onset shortness of breath that began this morning.    Patient states he got out of bed and went to the bathroom and was having difficulty ambulating to bathroom secondary to shortness of breath at around 5am. He went back to sleep then woke up with the same complaint, 911 called.  No chest pain, no fall, no other complaints, fever, chills, headache, changes in vision, cough, congestion, n/v/d, abd pain, constipation, urinary complaints, lower extremity pain/swelling.    During transportation with EMT, he was in svt on ekg, given 6 and 12 adenosine, and did not convert, was  hypotensive, cardioverted with 100J and 100 ketamine. Converted to sinus rhythm without complaints.    No clear inciting event for such SVT. (29 Jun 2023 16:09)    REVIEW OF SYSTEMS: see cc/HPI   CONSTITUTIONAL: No weakness, fevers or chills  EYES/ENT: No visual changes;  No vertigo or throat pain   NECK: No pain or stiffness  RESPIRATORY: No cough, wheezing, hemoptysis; (+) shortness of breath  CARDIOVASCULAR: No chest pain (+)  palpitations  GASTROINTESTINAL: No abdominal or epigastric pain. No nausea, vomiting, or hematemesis; No diarrhea or constipation. No melena or hematochezia.  GENITOURINARY: No dysuria, frequency or hematuria  NEUROLOGICAL: No numbness or weakness  SKIN: No itching, rashes    Physical Exam:   General: WN/WD NAD  Neurology: A&Ox3, nonfocal, follows commands  Eyes: PERRLA/ EOMI  ENT/Neck: Neck supple, trachea midline, No JVD  Respiratory: CTA B/L, No wheezing, rales, rhonchi  CV: Normal rate regular rhythm, S1S2, no murmurs, rubs or gallops  Abdominal: Soft, NT, ND +BS,   Extremities: No edema, + peripheral pulses  Skin: No Rashes, Hematoma, Ecchymosis    A/p  SVT s/p DCCV   H/o A.fib ( paroxysmal)  Elevated trop s/p cardioversion   -admit to tele   -EP eval   -serial CE and EKG   -agree w/ BBlocker and A/c if recommended by EP   -ASA / statin     R basial nodular density   -CT chest / pulm eval - can be OP   -smoking cessation     DM type II   Dyslipidemia   - statin   -F/S monitoring and   Anemia - normocytic     CKD IIIa  - serial monitoring     H/o prostate cancer s/p RT/brachytherapy   -Flomax     GERD- PPI    PATIENT SEEN by ATTENDING 6/29/23 ( note revised 6/30)

## 2023-06-30 LAB
A1C WITH ESTIMATED AVERAGE GLUCOSE RESULT: 7.4 % — HIGH (ref 4–5.6)
ALBUMIN SERPL ELPH-MCNC: 3.5 G/DL — SIGNIFICANT CHANGE UP (ref 3.5–5.2)
ALP SERPL-CCNC: 79 U/L — SIGNIFICANT CHANGE UP (ref 30–115)
ALT FLD-CCNC: 18 U/L — SIGNIFICANT CHANGE UP (ref 0–41)
ANION GAP SERPL CALC-SCNC: 11 MMOL/L — SIGNIFICANT CHANGE UP (ref 7–14)
ANION GAP SERPL CALC-SCNC: 11 MMOL/L — SIGNIFICANT CHANGE UP (ref 7–14)
AST SERPL-CCNC: 24 U/L — SIGNIFICANT CHANGE UP (ref 0–41)
BASOPHILS # BLD AUTO: 0.03 K/UL — SIGNIFICANT CHANGE UP (ref 0–0.2)
BASOPHILS NFR BLD AUTO: 0.4 % — SIGNIFICANT CHANGE UP (ref 0–1)
BILIRUB SERPL-MCNC: 0.6 MG/DL — SIGNIFICANT CHANGE UP (ref 0.2–1.2)
BUN SERPL-MCNC: 16 MG/DL — SIGNIFICANT CHANGE UP (ref 10–20)
BUN SERPL-MCNC: 17 MG/DL — SIGNIFICANT CHANGE UP (ref 10–20)
CALCIUM SERPL-MCNC: 8.5 MG/DL — SIGNIFICANT CHANGE UP (ref 8.4–10.5)
CALCIUM SERPL-MCNC: 9 MG/DL — SIGNIFICANT CHANGE UP (ref 8.4–10.5)
CHLORIDE SERPL-SCNC: 103 MMOL/L — SIGNIFICANT CHANGE UP (ref 98–110)
CHLORIDE SERPL-SCNC: 103 MMOL/L — SIGNIFICANT CHANGE UP (ref 98–110)
CO2 SERPL-SCNC: 22 MMOL/L — SIGNIFICANT CHANGE UP (ref 17–32)
CO2 SERPL-SCNC: 22 MMOL/L — SIGNIFICANT CHANGE UP (ref 17–32)
CREAT SERPL-MCNC: 0.9 MG/DL — SIGNIFICANT CHANGE UP (ref 0.7–1.5)
CREAT SERPL-MCNC: 1 MG/DL — SIGNIFICANT CHANGE UP (ref 0.7–1.5)
EGFR: 75 ML/MIN/1.73M2 — SIGNIFICANT CHANGE UP
EGFR: 85 ML/MIN/1.73M2 — SIGNIFICANT CHANGE UP
EOSINOPHIL # BLD AUTO: 0.3 K/UL — SIGNIFICANT CHANGE UP (ref 0–0.7)
EOSINOPHIL NFR BLD AUTO: 4.3 % — SIGNIFICANT CHANGE UP (ref 0–8)
ESTIMATED AVERAGE GLUCOSE: 166 MG/DL — HIGH (ref 68–114)
GLUCOSE BLDC GLUCOMTR-MCNC: 117 MG/DL — HIGH (ref 70–99)
GLUCOSE BLDC GLUCOMTR-MCNC: 163 MG/DL — HIGH (ref 70–99)
GLUCOSE BLDC GLUCOMTR-MCNC: 193 MG/DL — HIGH (ref 70–99)
GLUCOSE BLDC GLUCOMTR-MCNC: 236 MG/DL — HIGH (ref 70–99)
GLUCOSE BLDC GLUCOMTR-MCNC: 244 MG/DL — HIGH (ref 70–99)
GLUCOSE SERPL-MCNC: 104 MG/DL — HIGH (ref 70–99)
GLUCOSE SERPL-MCNC: 119 MG/DL — HIGH (ref 70–99)
HCT VFR BLD CALC: 34.3 % — LOW (ref 42–52)
HGB BLD-MCNC: 12 G/DL — LOW (ref 14–18)
IMM GRANULOCYTES NFR BLD AUTO: 0.4 % — HIGH (ref 0.1–0.3)
LYMPHOCYTES # BLD AUTO: 1.87 K/UL — SIGNIFICANT CHANGE UP (ref 1.2–3.4)
LYMPHOCYTES # BLD AUTO: 27.1 % — SIGNIFICANT CHANGE UP (ref 20.5–51.1)
MAGNESIUM SERPL-MCNC: 1.9 MG/DL — SIGNIFICANT CHANGE UP (ref 1.8–2.4)
MCHC RBC-ENTMCNC: 31 PG — SIGNIFICANT CHANGE UP (ref 27–31)
MCHC RBC-ENTMCNC: 35 G/DL — SIGNIFICANT CHANGE UP (ref 32–37)
MCV RBC AUTO: 88.6 FL — SIGNIFICANT CHANGE UP (ref 80–94)
MONOCYTES # BLD AUTO: 0.64 K/UL — HIGH (ref 0.1–0.6)
MONOCYTES NFR BLD AUTO: 9.3 % — SIGNIFICANT CHANGE UP (ref 1.7–9.3)
NEUTROPHILS # BLD AUTO: 4.03 K/UL — SIGNIFICANT CHANGE UP (ref 1.4–6.5)
NEUTROPHILS NFR BLD AUTO: 58.5 % — SIGNIFICANT CHANGE UP (ref 42.2–75.2)
NRBC # BLD: 0 /100 WBCS — SIGNIFICANT CHANGE UP (ref 0–0)
PLATELET # BLD AUTO: 131 K/UL — SIGNIFICANT CHANGE UP (ref 130–400)
PMV BLD: 11.3 FL — HIGH (ref 7.4–10.4)
POTASSIUM SERPL-MCNC: 3.9 MMOL/L — SIGNIFICANT CHANGE UP (ref 3.5–5)
POTASSIUM SERPL-MCNC: 4.2 MMOL/L — SIGNIFICANT CHANGE UP (ref 3.5–5)
POTASSIUM SERPL-SCNC: 3.9 MMOL/L — SIGNIFICANT CHANGE UP (ref 3.5–5)
POTASSIUM SERPL-SCNC: 4.2 MMOL/L — SIGNIFICANT CHANGE UP (ref 3.5–5)
PROT SERPL-MCNC: 5.2 G/DL — LOW (ref 6–8)
RBC # BLD: 3.87 M/UL — LOW (ref 4.7–6.1)
RBC # FLD: 13.2 % — SIGNIFICANT CHANGE UP (ref 11.5–14.5)
SODIUM SERPL-SCNC: 136 MMOL/L — SIGNIFICANT CHANGE UP (ref 135–146)
SODIUM SERPL-SCNC: 136 MMOL/L — SIGNIFICANT CHANGE UP (ref 135–146)
T4 FREE+ TSH PNL SERPL: 1.11 UIU/ML — SIGNIFICANT CHANGE UP (ref 0.27–4.2)
TROPONIN T SERPL-MCNC: 0.09 NG/ML — CRITICAL HIGH
TROPONIN T SERPL-MCNC: 0.12 NG/ML — CRITICAL HIGH
WBC # BLD: 6.9 K/UL — SIGNIFICANT CHANGE UP (ref 4.8–10.8)
WBC # FLD AUTO: 6.9 K/UL — SIGNIFICANT CHANGE UP (ref 4.8–10.8)

## 2023-06-30 PROCEDURE — 99232 SBSQ HOSP IP/OBS MODERATE 35: CPT

## 2023-06-30 PROCEDURE — 99223 1ST HOSP IP/OBS HIGH 75: CPT | Mod: 57,25

## 2023-06-30 PROCEDURE — 99222 1ST HOSP IP/OBS MODERATE 55: CPT

## 2023-06-30 PROCEDURE — 71275 CT ANGIOGRAPHY CHEST: CPT | Mod: 26

## 2023-06-30 PROCEDURE — 33285 INSJ SUBQ CAR RHYTHM MNTR: CPT

## 2023-06-30 PROCEDURE — 93306 TTE W/DOPPLER COMPLETE: CPT | Mod: 26

## 2023-06-30 RX ORDER — SODIUM ZIRCONIUM CYCLOSILICATE 10 G/10G
10 POWDER, FOR SUSPENSION ORAL ONCE
Refills: 0 | Status: COMPLETED | OUTPATIENT
Start: 2023-06-30 | End: 2023-07-01

## 2023-06-30 RX ORDER — REGADENOSON 0.08 MG/ML
0.4 INJECTION, SOLUTION INTRAVENOUS ONCE
Refills: 0 | Status: COMPLETED | OUTPATIENT
Start: 2023-06-30 | End: 2023-07-01

## 2023-06-30 RX ADMIN — ATORVASTATIN CALCIUM 40 MILLIGRAM(S): 80 TABLET, FILM COATED ORAL at 21:44

## 2023-06-30 RX ADMIN — Medication 25 MILLIGRAM(S): at 17:07

## 2023-06-30 RX ADMIN — Medication 25 MILLIGRAM(S): at 05:19

## 2023-06-30 RX ADMIN — Medication 1: at 08:44

## 2023-06-30 RX ADMIN — TAMSULOSIN HYDROCHLORIDE 0.4 MILLIGRAM(S): 0.4 CAPSULE ORAL at 05:18

## 2023-06-30 RX ADMIN — PANTOPRAZOLE SODIUM 40 MILLIGRAM(S): 20 TABLET, DELAYED RELEASE ORAL at 05:19

## 2023-06-30 RX ADMIN — Medication 2: at 11:42

## 2023-06-30 RX ADMIN — TAMSULOSIN HYDROCHLORIDE 0.4 MILLIGRAM(S): 0.4 CAPSULE ORAL at 17:07

## 2023-06-30 RX ADMIN — Medication 81 MILLIGRAM(S): at 11:41

## 2023-06-30 NOTE — CONSULT NOTE ADULT - SUBJECTIVE AND OBJECTIVE BOX
Outpt cardiologist:    Reason for Consult:     HISTORY OF PRESENT ILLNESS:  Case of 83-year-old male with PMHx of HTN, DM, DLD, GERD, prostate cancer (sp seed implant in ), pAfib, Hx of  SVT follows with Dr. Motta presenting to the ED for evaluation of acute onset shortness of breath that began this morning.    Patient states he got out of bed and went to the bathroom and was having difficulty ambulating to bathroom secondary to shortness of breath at around 5am. He went back to sleep then woke up with the same complaint, 911 called.  No chest pain, no fall, no other complaints, fever, chills, headache, changes in vision, cough, congestion, n/v/d, abd pain, constipation, urinary complaints, lower extremity pain/swelling.    During transportation with EMT, he was in svt on ekg, given 6 and 12 adenosine, and did not convert, was  hypotensive, cardioverted with 100J and 100 ketamine. Converted to sinus rhythm without complaints.    No clear inciting event for such SVT. (2023 16:09)      PAST MEDICAL & SURGICAL HISTORY  Diabetes mellitus    Hypertension    Paroxysmal atrial fibrillation    Prostate cancer    Status post cervical spinal fusion        FAMILY HISTORY:  FAMILY HISTORY:      SOCIAL HISTORY:  Social History:  Ex-smoker (pipe), quit 45 years ago    Occasional alcohol drinker    Lives at home, ambulates without assistance (2023 16:09)      ALLERGIES:  No Known Allergies      MEDICATIONS:  aspirin enteric coated 81 milliGRAM(s) Oral daily  atorvastatin 40 milliGRAM(s) Oral at bedtime  dextrose 5%. 1000 milliLiter(s) (100 mL/Hr) IV Continuous <Continuous>  dextrose 5%. 1000 milliLiter(s) (50 mL/Hr) IV Continuous <Continuous>  dextrose 50% Injectable 25 Gram(s) IV Push once  dextrose 50% Injectable 12.5 Gram(s) IV Push once  dextrose 50% Injectable 25 Gram(s) IV Push once  enoxaparin Injectable 40 milliGRAM(s) SubCutaneous every 24 hours  glucagon  Injectable 1 milliGRAM(s) IntraMuscular once  insulin lispro (ADMELOG) corrective regimen sliding scale   SubCutaneous three times a day before meals  metoprolol tartrate 25 milliGRAM(s) Oral two times a day  pantoprazole    Tablet 40 milliGRAM(s) Oral before breakfast  regadenoson Injectable 0.4 milliGRAM(s) IV Push once  tamsulosin 0.4 milliGRAM(s) Oral two times a day    PRN:  acetaminophen     Tablet .. 650 milliGRAM(s) Oral every 6 hours PRN  aluminum hydroxide/magnesium hydroxide/simethicone Suspension 30 milliLiter(s) Oral every 4 hours PRN  dextrose Oral Gel 15 Gram(s) Oral once PRN  melatonin 3 milliGRAM(s) Oral at bedtime PRN      HOME MEDICATIONS:  Home Medications:  aspirin 81 mg oral tablet: 1 tab(s) orally once a day (2023 17:16)  Coenzyme Q10 200 mg oral capsule: 200 tab(s) orally once a day (2023 17:16)  D3 25 mcg (1000 intl units) oral tablet: 1 tab(s) orally once a day (2023 17:16)  enalapril 2.5 mg oral tablet: 1 tab(s) orally once a day (2023 17:16)  Flomax 0.4 mg oral capsule: 1 tab(s) orally 2 times a day (2023 17:16)  glimepiride 1 mg oral tablet: 1 tab(s) orally 2 times a day (2023 17:16)  metFORMIN 1000 mg oral tablet: 1 tab(s) orally 2 times a day (2023 17:16)  metoprolol tartrate 25 mg oral tablet: 1 tab(s) orally once a day (2023 17:16)  Omega-3 Fish Oil 1000 mg oral capsule: 1 tab(s) orally 2 times a day (2023 17:16)  omeprazole 20 mg oral delayed release tablet: 1 tab(s) orally once a day (2023 17:16)  simvastatin 40 mg oral tablet: 1 tab(s) orally once a day (2023 17:16)      VITALS:   T(F): 98.1 ( @ 15:34), Max: 98.2 ( @ 10:35)  HR: 61 ( @ 15:34) (61 - 94)  BP: 107/68 ( @ 15:34) (107/68 - 147/78)  BP(mean): 90 ( @ 08:13) (90 - 90)  RR: 18 ( @ 15:34) (16 - 20)  SpO2: 99% ( @ 15:34) (96% - 100%)    I&O's Summary      REVIEW OF SYSTEMS:  CONSTITUTIONAL: No weakness, fevers or chills  HEENT: No visual changes, neck/ear pain  RESPIRATORY: No cough, sob  CARDIOVASCULAR: See HPI  GASTROINTESTINAL: No abdominal pain. No nausea, vomiting, diarrhea   GENITOURINARY: No dysuria, frequency or hematuria  NEUROLOGICAL: No new focal deficits  SKIN: No new rashes    PHYSICAL EXAM:  General: Not in distress.  Non-toxic appearing.   Cardio: regular, S1, S2, no murmur  Pulm: B/L BS.  No wheezing / crackles / rales  Abdomen: Soft, non-tender, non-distended. Normoactive bowel sounds  Extremities: No edema b/l le  Neuro: A&O x3. No focal deficits    LABS:                        12.0   6.90  )-----------( 131      ( 2023 05:36 )             34.3         136  |  103  |  16  ----------------------------<  119<H>  3.9   |  22  |  0.9    Ca    8.5      2023 05:36  Mg     1.9         TPro  5.2<L>  /  Alb  3.5  /  TBili  0.6  /  DBili  x   /  AST  24  /  ALT  18  /  AlkPhos  79        Troponin T, Serum: 0.09 ng/mL *HH* (23 @ 05:36)  Troponin T, Serum: 0.12 ng/mL *HH* (23 @ 00:30)    CARDIAC MARKERS ( 2023 05:36 )  x     / 0.09 ng/mL / x     / x     / x      CARDIAC MARKERS ( 2023 00:30 )  x     / 0.12 ng/mL / x     / x     / x      CARDIAC MARKERS ( 2023 10:45 )  x     / 0.11 ng/mL / x     / x     / x        IMAGING:  -TTE:  < from: TTE Echo Complete w/o Contrast w/ Doppler (23 @ 14:35) >  Summary:   1. LV Ejection Fraction by Nunes's Method with a biplane EF of 58 %.   2. Normal global left ventricular systolic function.   3. Normal left atrial size.   4. Normal right atrial size.      EC Lead ECG:   Ventricular Rate 68 BPM    Atrial Rate 68 BPM    P-R Interval 294 ms    QRS Duration 128 ms    Q-T Interval 414 ms    QTC Calculation(Bazett) 440 ms    P Axis 40 degrees    R Axis -15 degrees    T Axis 8 degrees    Diagnosis Line Sinus rhythm with 1st degree A-V block  Right bundle branch block  Minimal voltage criteria for LVH, may be normal variant  Abnormal ECG    Confirmed by Dale Monae ([id number]) on 2023 7:03:53 PM ( @ 16:30)      TELEMETRY EVENTS:

## 2023-06-30 NOTE — CONSULT NOTE ADULT - ATTENDING COMMENTS
Remote AF history (30-y ago)  No known recurrence / not anticoagulated    Comorbidities as above    Acute dyspnea and palpitations.  Reported SVT aborted with DCCV by EMS (no strips available for review).    Asymptomatic when evaluated  Hemodynamics stable  Minimal troponin elevation / minimal change.  Possibly small Type II NSTEMI in setting of tachycardia.    EKG: NSR, FAV, RBBB, LVH  ECHO: nL LVSF.  No significant valve disease.    Had 1st part of NST.    Essentially, arrhythmic event of unclear etiology.  [id number] = 4 (anticoagulation indicated if AF).  Elevated risk for CAD    - Continue telemetry.  - Agree with ILR given potential anticoagulation indication.  - Complete MPI / further CAD evaluation pending results  - Continue Metoprolol / monitor prolonger AR on EKG

## 2023-06-30 NOTE — CONSULT NOTE ADULT - SUBJECTIVE AND OBJECTIVE BOX
Patient is a 83y old  Male who presents with a chief complaint of palpitations and shortness of breath    HPI: 83-year-old male with PMHx of HTN, DM, DLD, GERD, prostate cancer (status post seed implant in 2008), c-spine fusion, paroxysmal atrial fibrillation not on anticoagulation (unknown reason) follows with Dr. Haider for cardiology, primary care physician Veto Prather MD  presented to the ED for evaluation of acute onset shortness of breath that began this morning for which EMS was called. During transportation with EMT noted the patient to have tachycardia on ekg for which he was given 6 and 12 adenosine which did not convert his rhythm but he became hypotensive and was cardioverted with 100J and 100 ketamine and converted to sinus rhythm. Electrophysiology was consulted for further recommendations. The patient was seen and evaluated. There is no echocardiogram and no recent ischemic work up. He states to have been diagnosed with atrial fibrillation back in 1994 however was never initiated on anticoagulation and nothing was mentioned further about it on follow ups according to the patient. 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 There is no documentation of the event that took place by EMS so we are unable to determine what arrythmia the patient had at the time.       PAST MEDICAL & SURGICAL HISTORY:  Diabetes mellitus  Hypertension  Paroxysmal atrial fibrillation  Prostate cancer  Status post cervical spinal fusion    PREVIOUS DIAGNOSTIC TESTING:      ECHO  FINDINGS: None    STRESS  FINDINGS: None    CATHETERIZATION  FINDINGS: None     ELECTROPHYSIOLOGY STUDY  FINDINGS: None    MEDICATIONS  (STANDING):  aspirin enteric coated 81 milliGRAM(s) Oral daily  atorvastatin 40 milliGRAM(s) Oral at bedtime  dextrose 5%. 1000 milliLiter(s) (100 mL/Hr) IV Continuous <Continuous>  dextrose 5%. 1000 milliLiter(s) (50 mL/Hr) IV Continuous <Continuous>  dextrose 50% Injectable 25 Gram(s) IV Push once  dextrose 50% Injectable 12.5 Gram(s) IV Push once  dextrose 50% Injectable 25 Gram(s) IV Push once  enoxaparin Injectable 40 milliGRAM(s) SubCutaneous every 24 hours  glucagon  Injectable 1 milliGRAM(s) IntraMuscular once  insulin lispro (ADMELOG) corrective regimen sliding scale   SubCutaneous three times a day before meals  metoprolol tartrate 25 milliGRAM(s) Oral two times a day  pantoprazole    Tablet 40 milliGRAM(s) Oral before breakfast  tamsulosin 0.4 milliGRAM(s) Oral two times a day    MEDICATIONS  (PRN):  acetaminophen     Tablet .. 650 milliGRAM(s) Oral every 6 hours PRN Mild Pain (1 - 3)  aluminum hydroxide/magnesium hydroxide/simethicone Suspension 30 milliLiter(s) Oral every 4 hours PRN Dyspepsia  dextrose Oral Gel 15 Gram(s) Oral once PRN Blood Glucose LESS THAN 70 milliGRAM(s)/deciliter  melatonin 3 milliGRAM(s) Oral at bedtime PRN Insomnia    FAMILY HISTORY: No history of sudden death    SOCIAL HISTORY: Lives at home    CIGARETTES: former pipe smoker quit 40 years ago    ALCOHOL: Former social     Past Surgical History: as above    Allergies: No Known Allergies      REVIEW OF SYSTEMS:  CONSTITUTIONAL: No fever, weight loss, chills, shakes, or fatigue  EYES: No eye pain, visual disturbances, or discharge  ENMT:  No difficulty hearing, tinnitus, vertigo; No sinus or throat pain  NECK: No pain or stiffness  BREASTS: No pain, masses, or nipple discharge  RESPIRATORY: No cough, wheezing, hemoptysis, (+) shortness of breath  CARDIOVASCULAR: No chest pain, dyspnea, (+) palpitations, dizziness, syncope, paroxysmal nocturnal dyspnea, orthopnea, or arm or leg swelling  GASTROINTESTINAL: No abdominal  or epigastric pain, nausea, vomiting, hematemesis, diarrhea, constipation, melena or bright red blood.  GENITOURINARY: No dysuria, nocturia, hematuria, or urinary incontinence  NEUROLOGICAL: No headaches, memory loss, slurred speech, limb weakness, loss of strength, numbness, or tremors  SKIN: No itching, burning, rashes, or lesions   LYMPH NODES: No enlarged glands  ENDOCRINE: No heat or cold intolerance, or hair loss  MUSCULOSKELETAL: No joint pain or swelling, muscle, back, or extremity pain  PSYCHIATRIC: No depression, anxiety, or difficulty sleeping  HEME/LYMPH: No easy bruising or bleeding gums  ALLERY AND IMMUNOLOGIC: No hives or rash.      Vital Signs Last 24 Hrs  T(C): 36.7 (30 Jun 2023 08:13), Max: 36.8 (29 Jun 2023 10:35)  T(F): 98.1 (30 Jun 2023 08:13), Max: 98.2 (29 Jun 2023 10:35)  HR: 61 (30 Jun 2023 08:13) (61 - 94)  BP: 117/72 (30 Jun 2023 08:13) (111/65 - 147/78)  BP(mean): 90 (30 Jun 2023 08:13) (90 - 90)  RR: 18 (30 Jun 2023 08:13) (16 - 20)  SpO2: 97% (30 Jun 2023 08:13) (96% - 100%)    Parameters below as of 30 Jun 2023 05:16  Patient On (Oxygen Delivery Method): room air    PHYSICAL EXAM:  GENERAL: In no apparent distress, well nourished, and hydrated.  HEAD:  Atraumatic, Normocephalic  EYES: EOMI, PERRLA, conjunctiva and sclera clear  ENMT: No tonsillar erythema, exudates, or enlargements; ist mucous membranes, Good dentition, No lesions  NECK: Supple and normal thyroid.  No JVD or carotid bruit.  Carotid pulse is 2+ bilaterally.  HEART: Regular rate and rhythm; No murmurs, rubs, or gallops.  PULMONARY: Clear to auscultation and perfusion.  No rales, wheezing, or rhonchi bilaterally.  ABDOMEN: Soft, Nontender, Nondistended; Bowel sounds present  EXTREMITIES:  2+ Peripheral Pulses, No clubbing, cyanosis, or edema  LYMPH: No lymphadenopathy noted  NEUROLOGICAL: Grossly nonfocal      INTERPRETATION OF TELEMETRY: sinus rhythm     ECG: Rhythm - Normal sinus, Rate - 68bpm, AV Conduction- 294ms, Interventricular conduction - right bundle branch block, QRS duration - 128ms, Hypertrophy - Absent, Myocardial Infarction - Absent, QT interval - 414ms, WPW - Absent, Brugada pattern - Absent.     I&O's Detail      LABS:                        12.0   6.90  )-----------( 131      ( 30 Jun 2023 05:36 )             34.3     06-30    136  |  103  |  16  ----------------------------<  119<H>  3.9   |  22  |  0.9    Ca    8.5      30 Jun 2023 05:36  Mg     1.9     06-30    TPro  5.2<L>  /  Alb  3.5  /  TBili  0.6  /  DBili  x   /  AST  24  /  ALT  18  /  AlkPhos  79  06-30    CARDIAC MARKERS ( 30 Jun 2023 05:36 )  x     / 0.09 ng/mL / x     / x     / x      CARDIAC MARKERS ( 30 Jun 2023 00:30 )  x     / 0.12 ng/mL / x     / x     / x      CARDIAC MARKERS ( 29 Jun 2023 10:45 )  x     / 0.11 ng/mL / x     / x     / x            Urinalysis Basic - ( 30 Jun 2023 05:36 )    Color: x / Appearance: x / SG: x / pH: x  Gluc: 119 mg/dL / Ketone: x  / Bili: x / Urobili: x   Blood: x / Protein: x / Nitrite: x   Leuk Esterase: x / RBC: x / WBC x   Sq Epi: x / Non Sq Epi: x / Bacteria: x      BNP  I&O's Detail    Daily     Daily     RADIOLOGY & ADDITIONAL STUDIES: Patient is a 83y old  Male who presents with a chief complaint of palpitations and shortness of breath    HPI: 83-year-old male with PMHx of HTN, DM, DLD, GERD, prostate cancer (status post seed implant in 2008), c-spine fusion, paroxysmal atrial fibrillation not on anticoagulation (unknown reason) follows with Dr. Haider for cardiology, primary care physician Veto Prather MD  presented to the ED for evaluation of acute onset shortness of breath that began this morning for which EMS was called. During transportation with EMT noted the patient to have tachycardia on ekg for which he was given 6 and 12 adenosine which did not convert his rhythm but he became hypotensive and was cardioverted with 100J and 100 ketamine and converted to sinus rhythm. Electrophysiology was consulted for further recommendations. The patient was seen and evaluated. There is no echocardiogram and no recent ischemic work up. He states to have been diagnosed with atrial fibrillation back in 1994 however was never initiated on anticoagulation and nothing was mentioned further about it on follow ups according to the patient. 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 There is no documentation of the event that took place by EMS so we are unable to determine what arrythmia the patient had at the time.       PAST MEDICAL & SURGICAL HISTORY:  Diabetes mellitus  Hypertension  Paroxysmal atrial fibrillation  Prostate cancer  Status post cervical spinal fusion    PREVIOUS DIAGNOSTIC TESTING:      ECHO  FINDINGS: None    STRESS  FINDINGS: None    CATHETERIZATION  FINDINGS: None     ELECTROPHYSIOLOGY STUDY  FINDINGS: None    MEDICATIONS  (STANDING):  aspirin enteric coated 81 milliGRAM(s) Oral daily  atorvastatin 40 milliGRAM(s) Oral at bedtime  dextrose 5%. 1000 milliLiter(s) (100 mL/Hr) IV Continuous <Continuous>  dextrose 5%. 1000 milliLiter(s) (50 mL/Hr) IV Continuous <Continuous>  dextrose 50% Injectable 25 Gram(s) IV Push once  dextrose 50% Injectable 12.5 Gram(s) IV Push once  dextrose 50% Injectable 25 Gram(s) IV Push once  enoxaparin Injectable 40 milliGRAM(s) SubCutaneous every 24 hours  glucagon  Injectable 1 milliGRAM(s) IntraMuscular once  insulin lispro (ADMELOG) corrective regimen sliding scale   SubCutaneous three times a day before meals  metoprolol tartrate 25 milliGRAM(s) Oral two times a day  pantoprazole    Tablet 40 milliGRAM(s) Oral before breakfast  tamsulosin 0.4 milliGRAM(s) Oral two times a day    MEDICATIONS  (PRN):  acetaminophen     Tablet .. 650 milliGRAM(s) Oral every 6 hours PRN Mild Pain (1 - 3)  aluminum hydroxide/magnesium hydroxide/simethicone Suspension 30 milliLiter(s) Oral every 4 hours PRN Dyspepsia  dextrose Oral Gel 15 Gram(s) Oral once PRN Blood Glucose LESS THAN 70 milliGRAM(s)/deciliter  melatonin 3 milliGRAM(s) Oral at bedtime PRN Insomnia    FAMILY HISTORY: No history of sudden death    SOCIAL HISTORY: Lives at home    CIGARETTES: former pipe smoker quit 40 years ago    ALCOHOL: Former social     Past Surgical History: as above    Allergies: No Known Allergies      REVIEW OF SYSTEMS:  CONSTITUTIONAL: No fever, weight loss, chills, shakes, or fatigue  EYES: No eye pain, visual disturbances, or discharge  ENMT:  No difficulty hearing, tinnitus, vertigo; No sinus or throat pain  NECK: No pain or stiffness  BREASTS: No pain, masses, or nipple discharge  RESPIRATORY: No cough, wheezing, hemoptysis, (+) shortness of breath  CARDIOVASCULAR: No chest pain, dyspnea, (+) palpitations, dizziness, syncope, paroxysmal nocturnal dyspnea, orthopnea, or arm or leg swelling  GASTROINTESTINAL: No abdominal  or epigastric pain, nausea, vomiting, hematemesis, diarrhea, constipation, melena or bright red blood.  GENITOURINARY: No dysuria, nocturia, hematuria, or urinary incontinence  NEUROLOGICAL: No headaches, memory loss, slurred speech, limb weakness, loss of strength, numbness, or tremors  SKIN: No itching, burning, rashes, or lesions   LYMPH NODES: No enlarged glands  ENDOCRINE: No heat or cold intolerance, or hair loss  MUSCULOSKELETAL: No joint pain or swelling, muscle, back, or extremity pain  PSYCHIATRIC: No depression, anxiety, or difficulty sleeping  HEME/LYMPH: No easy bruising or bleeding gums  ALLERY AND IMMUNOLOGIC: No hives or rash.      Vital Signs Last 24 Hrs  T(C): 36.7 (30 Jun 2023 08:13), Max: 36.8 (29 Jun 2023 10:35)  T(F): 98.1 (30 Jun 2023 08:13), Max: 98.2 (29 Jun 2023 10:35)  HR: 61 (30 Jun 2023 08:13) (61 - 94)  BP: 117/72 (30 Jun 2023 08:13) (111/65 - 147/78)  BP(mean): 90 (30 Jun 2023 08:13) (90 - 90)  RR: 18 (30 Jun 2023 08:13) (16 - 20)  SpO2: 97% (30 Jun 2023 08:13) (96% - 100%)    Parameters below as of 30 Jun 2023 05:16  Patient On (Oxygen Delivery Method): room air    PHYSICAL EXAM:  GENERAL: In no apparent distress, well nourished, and hydrated.  HEAD:  Atraumatic, Normocephalic  EYES: EOMI, PERRLA, conjunctiva and sclera clear  ENMT: MMM  NECK: Supple and normal thyroid.  No JVD or carotid bruit.  Carotid pulse is 2+ bilaterally.  HEART: Regular rate and rhythm; No murmurs, rubs, or gallops.  PULMONARY: Clear to auscultation and perfusion.  No rales, wheezing, or rhonchi bilaterally.  ABDOMEN: Soft   EXTREMITIES:  2+ Peripheral Pulses, No clubbing, cyanosis, or edema  NEUROLOGICAL: Grossly nonfocal      INTERPRETATION OF TELEMETRY: sinus rhythm     ECG: Rhythm - Normal sinus, Rate - 68bpm, AV Conduction- 294ms, Interventricular conduction - right bundle branch block, QRS duration - 128ms, Hypertrophy - Absent, Myocardial Infarction - Absent, QT interval - 414ms, WPW - Absent, Brugada pattern - Absent.     I&O's Detail      LABS:                        12.0   6.90  )-----------( 131      ( 30 Jun 2023 05:36 )             34.3     06-30    136  |  103  |  16  ----------------------------<  119<H>  3.9   |  22  |  0.9    Ca    8.5      30 Jun 2023 05:36  Mg     1.9     06-30    TPro  5.2<L>  /  Alb  3.5  /  TBili  0.6  /  DBili  x   /  AST  24  /  ALT  18  /  AlkPhos  79  06-30    CARDIAC MARKERS ( 30 Jun 2023 05:36 )  x     / 0.09 ng/mL / x     / x     / x      CARDIAC MARKERS ( 30 Jun 2023 00:30 )  x     / 0.12 ng/mL / x     / x     / x      CARDIAC MARKERS ( 29 Jun 2023 10:45 )  x     / 0.11 ng/mL / x     / x     / x            Urinalysis Basic - ( 30 Jun 2023 05:36 )    Color: x / Appearance: x / SG: x / pH: x  Gluc: 119 mg/dL / Ketone: x  / Bili: x / Urobili: x   Blood: x / Protein: x / Nitrite: x   Leuk Esterase: x / RBC: x / WBC x   Sq Epi: x / Non Sq Epi: x / Bacteria: x      BNP  I&O's Detail    Daily     Daily     RADIOLOGY & ADDITIONAL STUDIES:

## 2023-06-30 NOTE — PROGRESS NOTE ADULT - SUBJECTIVE AND OBJECTIVE BOX
MEDICINE ATTENDING PROGRESS NOTE      Interval/Overnight events:    pw SOB, DEJESUS in setting of SVT (pt found to have SVT on EKG) s/p cardioversion  CXR Subcentimeter right basilar nodular densities, may represent calcified granulomas or lung nodules  Will get CTA chest given finding on imaging and pt former smoker  Pending imaging, we may get QuantiFeron  Cardiology/EP follows; possible loop recorder before dc  CHADVAsc 4 ->start Eliquis, c/w ASA  c/wbeta blocker, 2D echo  NSTEMI in setting of cardioversion  f/u FT4, TSH      ROS:   General: denies fever, chills, insomnia, depression, weight change  Skin: denies itch, jaundice   Resp: denies cough, pleuritic chest pain, wheezing   CV: denies PND  GI: denies N/V/D constipation   : denies d/c, itching, hematuria, dysuria   EXT: denies pain, swelling, erythema   Musculoskeletal: denies low back pain, joint pain, swelling   Neuro: denies headache, weakness, syncope    MEDICATIONS  (STANDING):  aspirin enteric coated 81 milliGRAM(s) Oral daily  atorvastatin 40 milliGRAM(s) Oral at bedtime  dextrose 5%. 1000 milliLiter(s) (100 mL/Hr) IV Continuous <Continuous>  dextrose 5%. 1000 milliLiter(s) (50 mL/Hr) IV Continuous <Continuous>  dextrose 50% Injectable 25 Gram(s) IV Push once  dextrose 50% Injectable 12.5 Gram(s) IV Push once  dextrose 50% Injectable 25 Gram(s) IV Push once  enoxaparin Injectable 40 milliGRAM(s) SubCutaneous every 24 hours  glucagon  Injectable 1 milliGRAM(s) IntraMuscular once  insulin lispro (ADMELOG) corrective regimen sliding scale   SubCutaneous three times a day before meals  metoprolol tartrate 25 milliGRAM(s) Oral two times a day  pantoprazole    Tablet 40 milliGRAM(s) Oral before breakfast  tamsulosin 0.4 milliGRAM(s) Oral two times a day    MEDICATIONS  (PRN):  acetaminophen     Tablet .. 650 milliGRAM(s) Oral every 6 hours PRN Mild Pain (1 - 3)  aluminum hydroxide/magnesium hydroxide/simethicone Suspension 30 milliLiter(s) Oral every 4 hours PRN Dyspepsia  dextrose Oral Gel 15 Gram(s) Oral once PRN Blood Glucose LESS THAN 70 milliGRAM(s)/deciliter  melatonin 3 milliGRAM(s) Oral at bedtime PRN Insomnia      VITALS  T(F): 98.1 (06-30-23 @ 08:13), Max: 98.2 (06-29-23 @ 10:35)  HR: 61 (06-30-23 @ 08:13) (61 - 94)  BP: 117/72 (06-30-23 @ 08:13) (111/65 - 147/78)  RR: 18 (06-30-23 @ 08:13) (16 - 20)  SpO2: 97% (06-30-23 @ 08:13) (96% - 100%)              PHYSICAL EXAM  Gen- NAD, AAOx3  HEENT- no pallor, anicteric, moist mucous membranes  CVS- S1/S2, no m/r/g  Chest- CTA b/l no w/r/c, no use of accessory muscles, good inspiratory effort, speaking in full sentences  Abd- soft, nt, nd  Ext- no edema, pulses intact b/l  Neuro-CN II-XII intact;    LABS/IMAGING  06-30    136  |  103  |  16  ----------------------------<  119<H>  3.9   |  22  |  0.9    Ca    8.5      30 Jun 2023 05:36  Mg     1.9     06-30    TPro  5.2<L>  /  Alb  3.5  /  TBili  0.6  /  DBili  x   /  AST  24  /  ALT  18  /  AlkPhos  79  06-30    LIVER FUNCTIONS - ( 30 Jun 2023 05:36 )  Alb: 3.5 g/dL / Pro: 5.2 g/dL / ALK PHOS: 79 U/L / ALT: 18 U/L / AST: 24 U/L / GGT: x                                 12.0   6.90  )-----------( 131      ( 30 Jun 2023 05:36 )             34.3           Image(s) Available  --  --  --    CARDIAC MARKERS ( 30 Jun 2023 05:36 )  x     / 0.09 ng/mL / x     / x     / x      CARDIAC MARKERS ( 30 Jun 2023 00:30 )  x     / 0.12 ng/mL / x     / x     / x      CARDIAC MARKERS ( 29 Jun 2023 10:45 )  x     / 0.11 ng/mL / x     / x     / x            MICROBIOLOGY      IMAGE  < from: Xray Chest 1 View- PORTABLE-Urgent (06.29.23 @ 11:34) >  No focal pulmonary consolidation.    Subcentimeter right basilar nodular densities, may represent calcified   granulomas or lung nodules. Recommend chest CT follow-up.    < end of copied text >        A/P:      #SUPPORTIVE MANAGEMENT/DISPO  - Dispo:   - DVT Ppx:   - GI Ppx:   - Diet    Spent over 35 min reviewing chart and on coordinating patient care during interdisciplinary rounds     Selvin Reyes M.D./Nanci  Attending Physician  MEDICINE ATTENDING PROGRESS NOTE  83-year-old male with PMHx of HTN, DM, DLD, GERD, prostate cancer (sp seed implant in 2008) Hx of  SVT follows with Dr. Motta presenting to the ED for evaluation of acute onset shortness of breath. Found to have SVT on EKG. Given 6 and 12 adenosine, and did not convert, was  hypotensive, cardioverted with 100J and 100 ketamine. Converted to sinus rhythm without complaints. Admitted for further work up.    Interval/Overnight events:  - pw SOB, DEJESUS in setting of SVT (pt found to have SVT on EKG) s/p cardioversion  - CXR Subcentimeter right basilar nodular densities, may represent calcified granulomas or lung nodules  - Will get CTA chest given finding on imaging and pt former smoker  - Pending imaging, we may get QuantiFeron  - Cardiology/EP follows; possible loop recorder before dc  - CHADVAsc 4 ->start Eliquis, c/w ASA  - c/wbeta blocker, 2D echo  - NSTEMI in setting of cardioversion  - f/u FT4, TSH    ROS:   General: denies fever, chills, insomnia, depression, weight change  Skin: denies itch, jaundice   Resp: denies cough, pleuritic chest pain, wheezing   CV: denies PND  GI: denies N/V/D constipation   : denies d/c, itching, hematuria, dysuria   EXT: denies pain, swelling, erythema   Musculoskeletal: denies low back pain, joint pain, swelling   Neuro: denies headache, weakness, syncope    MEDICATIONS  (STANDING):  aspirin enteric coated 81 milliGRAM(s) Oral daily  atorvastatin 40 milliGRAM(s) Oral at bedtime  dextrose 5%. 1000 milliLiter(s) (100 mL/Hr) IV Continuous <Continuous>  dextrose 5%. 1000 milliLiter(s) (50 mL/Hr) IV Continuous <Continuous>  dextrose 50% Injectable 25 Gram(s) IV Push once  dextrose 50% Injectable 12.5 Gram(s) IV Push once  dextrose 50% Injectable 25 Gram(s) IV Push once  enoxaparin Injectable 40 milliGRAM(s) SubCutaneous every 24 hours  glucagon  Injectable 1 milliGRAM(s) IntraMuscular once  insulin lispro (ADMELOG) corrective regimen sliding scale   SubCutaneous three times a day before meals  metoprolol tartrate 25 milliGRAM(s) Oral two times a day  pantoprazole    Tablet 40 milliGRAM(s) Oral before breakfast  tamsulosin 0.4 milliGRAM(s) Oral two times a day    MEDICATIONS  (PRN):  acetaminophen     Tablet .. 650 milliGRAM(s) Oral every 6 hours PRN Mild Pain (1 - 3)  aluminum hydroxide/magnesium hydroxide/simethicone Suspension 30 milliLiter(s) Oral every 4 hours PRN Dyspepsia  dextrose Oral Gel 15 Gram(s) Oral once PRN Blood Glucose LESS THAN 70 milliGRAM(s)/deciliter  melatonin 3 milliGRAM(s) Oral at bedtime PRN Insomnia    VITALS  T(F): 98.1 (06-30-23 @ 08:13), Max: 98.2 (06-29-23 @ 10:35)  HR: 61 (06-30-23 @ 08:13) (61 - 94)  BP: 117/72 (06-30-23 @ 08:13) (111/65 - 147/78)  RR: 18 (06-30-23 @ 08:13) (16 - 20)  SpO2: 97% (06-30-23 @ 08:13) (96% - 100%)    PHYSICAL EXAM  Gen- NAD, AAOx3  HEENT- no pallor, anicteric, moist mucous membranes  CVS- S1/S2, no m/r/g  Chest- CTA b/l no w/r/c, no use of accessory muscles, good inspiratory effort, speaking in full sentences  Abd- soft, nt, nd  Ext- no edema, pulses intact b/l  Neuro-CN II-XII intact;    LABS/IMAGING  06-30    136  |  103  |  16  ----------------------------<  119<H>  3.9   |  22  |  0.9    Ca    8.5      30 Jun 2023 05:36  Mg     1.9     06-30    TPro  5.2<L>  /  Alb  3.5  /  TBili  0.6  /  DBili  x   /  AST  24  /  ALT  18  /  AlkPhos  79  06-30    LIVER FUNCTIONS - ( 30 Jun 2023 05:36 )  Alb: 3.5 g/dL / Pro: 5.2 g/dL / ALK PHOS: 79 U/L / ALT: 18 U/L / AST: 24 U/L / GGT: x                                 12.0   6.90  )-----------( 131      ( 30 Jun 2023 05:36 )             34.3     CARDIAC MARKERS ( 30 Jun 2023 05:36 )  x     / 0.09 ng/mL / x     / x     / x      CARDIAC MARKERS ( 30 Jun 2023 00:30 )  x     / 0.12 ng/mL / x     / x     / x      CARDIAC MARKERS ( 29 Jun 2023 10:45 )  x     / 0.11 ng/mL / x     / x     / x        MICROBIOLOGY      IMAGE  Xray Chest 1 View- PORTABLE-Urgent (06.29.23 @ 11:34)  No focal pulmonary consolidation.  Subcentimeter right basilar nodular densities, may represent calcified   granulomas or lung nodules. Recommend chest CT follow-up.      A/P: 83-year-old male with PMHx of HTN, DM, DLD, GERD, prostate cancer (sp seed implant in 2008) Hx of  SVT follows with Dr. Motta presenting to the ED for evaluation of acute onset shortness of breath. Found to have SVT on EKG. Given 6 and 12 adenosine, and did not convert, was  hypotensive, cardioverted with 100J and 100 ketamine. Converted to sinus rhythm without complaints. Admitted for further work up.    SVT s/p DCCV   H/o A.fib ( paroxysmal)  Elevated trop s/p cardioversion    -EP eval: serial CE and EKG   -agree w/ BBlocker and A/c if recommended by EP   -ASA / statin     R basial nodular density   - f/u CT Chest  - pulm eval - can be OP   - smoking cessation     DM type II   Dyslipidemia   - statin   -F/S monitoring and   Anemia - normocytic     CKD IIIa  - serial monitoring     H/o prostate cancer s/p RT/brachytherapy   -Flomax     GERD- PPI    Spent over 35 min reviewing chart and on coordinating patient care during interdisciplinary rounds     Selvin Reyes M.D./Nanci  Attending Physician

## 2023-06-30 NOTE — CONSULT NOTE ADULT - NS ATTEND AMEND GEN_ALL_CORE FT
H/o AFib (felt irregular HR in 1994). No recurrence.     SVT with HR up to 205 bpm per pt and son requiring cardioversion.   Pt with associated sig dyspnea.     Rec  - 2D echo  - Check TFTs  - Monitor on tele. Obtain strips from ED or EMS if possible (no strips in chart available for review).   - Ischemic work up per cardio  - Loop if normal EF

## 2023-06-30 NOTE — CONSULT NOTE ADULT - ASSESSMENT
83-year-old male with PMHx of HTN, DM, DLD, GERD, prostate cancer (status post seed implant in 2008), c-spine fusion, paroxysmal atrial fibrillation not on anticoagulation (unknown reason) follows with Dr. Haider for cardiology, primary care physician Veto Prather MD  presented to the ED for evaluation of acute onset shortness of breath that began this morning for which EMS was called. During transportation with EMT noted the patient to have tachycardia on ekg for which he was given 6 and 12 adenosine which did not convert his rhythm but he became hypotensive and was cardioverted with 100J and 100 ketamine and converted to sinus rhythm. Electrophysiology was consulted for further recommendations. The patient was seen and evaluated. There is no echocardiogram and no recent ischemic work up. He states to have been diagnosed with atrial fibrillation back in 1994 however was never initiated on anticoagulation and nothing was mentioned further about it on follow ups according to the patient. 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 There is no documentation of the event that took place by EMS so we are unable to determine what arrythmia the patient had at the time.     Plan:   1. Anticoagulation: CHADS-VASC score of 4 should be initiated on anticoagulation if no contraindications exist  2. Rate control: On Metoprolol which can be titrated as tolerated  3: Please obtain an echocardiogram  4: Consider an ischemic work up if deemed appropriate  5: As there is no documented evidence of the arrythmia occurrence would suggest an internal loop recorder implant prior to discharge.    Cardio: Dr. Bobby Parker     83-year-old male with PMHx of HTN, DM, DLD, GERD, prostate cancer (status post seed implant in 2008), c-spine fusion, paroxysmal atrial fibrillation not on anticoagulation (unknown reason) follows with Dr. Haider for cardiology, primary care physician Veto Prather MD  presented to the ED for evaluation of acute onset shortness of breath that began this morning for which EMS was called. During transportation with EMT noted the patient to have tachycardia on ekg for which he was given 6 and 12 adenosine which did not convert his rhythm but he became hypotensive and was cardioverted with 100J and 100 ketamine and converted to sinus rhythm. Electrophysiology was consulted for further recommendations. The patient was seen and evaluated. There is no echocardiogram and no recent ischemic work up. He states to have been diagnosed with atrial fibrillation back in 1994 however was never initiated on anticoagulation and nothing was mentioned further about it on follow ups according to the patient. 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 There is no documentation of the event that took place by EMS so we are unable to determine what arrythmia the patient had at the time.     Plan:   1. Anticoagulation: CHADS-VASC score of 4 should be initiated on anticoagulation if no contraindications exist  2. Rate control: On Metoprolol which can be titrated as tolerated  3: Please obtain an echocardiogram  4: Consider an ischemic work up   5: As there is no documented evidence of the arrythmia occurrence would suggest an internal loop recorder implant prior to discharge.

## 2023-06-30 NOTE — CONSULT NOTE ADULT - ASSESSMENT
Impression   # DEJESUS likely secondary to SVT   # Elevated troponin - Demand ischemia in the setting of tachycardia   # Paroxysmal a fib     Recommendations   - Repeat troponin to ensure downtrend   - Stress test ordered by primary team will follow up results   - Patient should optimatlly be on anticoagulation for storke prevention   - [facility]     Discussed with attending.   Signature: Antonio MEYERS, 1st year Cardiology Fellow

## 2023-07-01 ENCOUNTER — TRANSCRIPTION ENCOUNTER (OUTPATIENT)
Age: 83
End: 2023-07-01

## 2023-07-01 VITALS
TEMPERATURE: 97 F | HEART RATE: 70 BPM | SYSTOLIC BLOOD PRESSURE: 132 MMHG | DIASTOLIC BLOOD PRESSURE: 66 MMHG | RESPIRATION RATE: 18 BRPM

## 2023-07-01 LAB
ANION GAP SERPL CALC-SCNC: 11 MMOL/L — SIGNIFICANT CHANGE UP (ref 7–14)
BUN SERPL-MCNC: 12 MG/DL — SIGNIFICANT CHANGE UP (ref 10–20)
CALCIUM SERPL-MCNC: 8.5 MG/DL — SIGNIFICANT CHANGE UP (ref 8.4–10.5)
CHLORIDE SERPL-SCNC: 102 MMOL/L — SIGNIFICANT CHANGE UP (ref 98–110)
CO2 SERPL-SCNC: 23 MMOL/L — SIGNIFICANT CHANGE UP (ref 17–32)
CREAT SERPL-MCNC: 1 MG/DL — SIGNIFICANT CHANGE UP (ref 0.7–1.5)
EGFR: 75 ML/MIN/1.73M2 — SIGNIFICANT CHANGE UP
GLUCOSE BLDC GLUCOMTR-MCNC: 160 MG/DL — HIGH (ref 70–99)
GLUCOSE BLDC GLUCOMTR-MCNC: 175 MG/DL — HIGH (ref 70–99)
GLUCOSE BLDC GLUCOMTR-MCNC: 268 MG/DL — HIGH (ref 70–99)
GLUCOSE SERPL-MCNC: 169 MG/DL — HIGH (ref 70–99)
HCT VFR BLD CALC: 35.6 % — LOW (ref 42–52)
HGB BLD-MCNC: 12.5 G/DL — LOW (ref 14–18)
MAGNESIUM SERPL-MCNC: 1.9 MG/DL — SIGNIFICANT CHANGE UP (ref 1.8–2.4)
MCHC RBC-ENTMCNC: 31 PG — SIGNIFICANT CHANGE UP (ref 27–31)
MCHC RBC-ENTMCNC: 35.1 G/DL — SIGNIFICANT CHANGE UP (ref 32–37)
MCV RBC AUTO: 88.3 FL — SIGNIFICANT CHANGE UP (ref 80–94)
NRBC # BLD: 0 /100 WBCS — SIGNIFICANT CHANGE UP (ref 0–0)
PLATELET # BLD AUTO: 131 K/UL — SIGNIFICANT CHANGE UP (ref 130–400)
PMV BLD: 10.9 FL — HIGH (ref 7.4–10.4)
POTASSIUM SERPL-MCNC: 4.2 MMOL/L — SIGNIFICANT CHANGE UP (ref 3.5–5)
POTASSIUM SERPL-SCNC: 4.2 MMOL/L — SIGNIFICANT CHANGE UP (ref 3.5–5)
RBC # BLD: 4.03 M/UL — LOW (ref 4.7–6.1)
RBC # FLD: 12.9 % — SIGNIFICANT CHANGE UP (ref 11.5–14.5)
SODIUM SERPL-SCNC: 136 MMOL/L — SIGNIFICANT CHANGE UP (ref 135–146)
T4 FREE SERPL-MCNC: 1.3 NG/DL — SIGNIFICANT CHANGE UP (ref 0.9–1.8)
TSH SERPL-MCNC: 1.08 UIU/ML — SIGNIFICANT CHANGE UP (ref 0.27–4.2)
WBC # BLD: 6 K/UL — SIGNIFICANT CHANGE UP (ref 4.8–10.8)
WBC # FLD AUTO: 6 K/UL — SIGNIFICANT CHANGE UP (ref 4.8–10.8)

## 2023-07-01 PROCEDURE — 99233 SBSQ HOSP IP/OBS HIGH 50: CPT

## 2023-07-01 PROCEDURE — 93016 CV STRESS TEST SUPVJ ONLY: CPT

## 2023-07-01 PROCEDURE — 93018 CV STRESS TEST I&R ONLY: CPT

## 2023-07-01 PROCEDURE — 78452 HT MUSCLE IMAGE SPECT MULT: CPT | Mod: 26

## 2023-07-01 RX ORDER — ENOXAPARIN SODIUM 100 MG/ML
90 INJECTION SUBCUTANEOUS EVERY 12 HOURS
Refills: 0 | Status: DISCONTINUED | OUTPATIENT
Start: 2023-07-01 | End: 2023-07-01

## 2023-07-01 RX ORDER — METOPROLOL TARTRATE 50 MG
1 TABLET ORAL
Qty: 0 | Refills: 0 | DISCHARGE
Start: 2023-07-01

## 2023-07-01 RX ORDER — ENOXAPARIN SODIUM 100 MG/ML
50 INJECTION SUBCUTANEOUS ONCE
Refills: 0 | Status: COMPLETED | OUTPATIENT
Start: 2023-07-01 | End: 2023-07-01

## 2023-07-01 RX ORDER — METOPROLOL TARTRATE 50 MG
1 TABLET ORAL
Refills: 0 | DISCHARGE

## 2023-07-01 RX ADMIN — Medication 3: at 16:46

## 2023-07-01 RX ADMIN — TAMSULOSIN HYDROCHLORIDE 0.4 MILLIGRAM(S): 0.4 CAPSULE ORAL at 17:30

## 2023-07-01 RX ADMIN — Medication 1: at 08:33

## 2023-07-01 RX ADMIN — Medication 25 MILLIGRAM(S): at 05:14

## 2023-07-01 RX ADMIN — SODIUM ZIRCONIUM CYCLOSILICATE 10 GRAM(S): 10 POWDER, FOR SUSPENSION ORAL at 05:13

## 2023-07-01 RX ADMIN — TAMSULOSIN HYDROCHLORIDE 0.4 MILLIGRAM(S): 0.4 CAPSULE ORAL at 05:14

## 2023-07-01 RX ADMIN — REGADENOSON 0.4 MILLIGRAM(S): 0.08 INJECTION, SOLUTION INTRAVENOUS at 12:43

## 2023-07-01 RX ADMIN — PANTOPRAZOLE SODIUM 40 MILLIGRAM(S): 20 TABLET, DELAYED RELEASE ORAL at 05:14

## 2023-07-01 RX ADMIN — Medication 25 MILLIGRAM(S): at 17:30

## 2023-07-01 NOTE — DISCHARGE NOTE PROVIDER - NSDCMRMEDTOKEN_GEN_ALL_CORE_FT
aspirin 81 mg oral tablet: 1 tab(s) orally once a day  Coenzyme Q10 200 mg oral capsule: 200 tab(s) orally once a day  D3 25 mcg (1000 intl units) oral tablet: 1 tab(s) orally once a day  enalapril 2.5 mg oral tablet: 1 tab(s) orally once a day  Flomax 0.4 mg oral capsule: 1 tab(s) orally 2 times a day  glimepiride 1 mg oral tablet: 1 tab(s) orally 2 times a day  metFORMIN 1000 mg oral tablet: 1 tab(s) orally 2 times a day  metoprolol tartrate 25 mg oral tablet: 1 tab(s) orally 2 times a day  Omega-3 Fish Oil 1000 mg oral capsule: 1 tab(s) orally 2 times a day  omeprazole 20 mg oral delayed release tablet: 1 tab(s) orally once a day  simvastatin 40 mg oral tablet: 1 tab(s) orally once a day

## 2023-07-01 NOTE — PROGRESS NOTE ADULT - SUBJECTIVE AND OBJECTIVE BOX
INTERVAL HPI/OVERNIGHT EVENTS:    MEDICATIONS  (STANDING):  aspirin enteric coated 81 milliGRAM(s) Oral daily  atorvastatin 40 milliGRAM(s) Oral at bedtime  dextrose 5%. 1000 milliLiter(s) (100 mL/Hr) IV Continuous <Continuous>  dextrose 5%. 1000 milliLiter(s) (50 mL/Hr) IV Continuous <Continuous>  dextrose 50% Injectable 25 Gram(s) IV Push once  dextrose 50% Injectable 12.5 Gram(s) IV Push once  dextrose 50% Injectable 25 Gram(s) IV Push once  enoxaparin Injectable 90 milliGRAM(s) SubCutaneous every 12 hours  enoxaparin Injectable 50 milliGRAM(s) SubCutaneous once  glucagon  Injectable 1 milliGRAM(s) IntraMuscular once  insulin lispro (ADMELOG) corrective regimen sliding scale   SubCutaneous three times a day before meals  metoprolol tartrate 25 milliGRAM(s) Oral two times a day  pantoprazole    Tablet 40 milliGRAM(s) Oral before breakfast  regadenoson Injectable 0.4 milliGRAM(s) IV Push once  tamsulosin 0.4 milliGRAM(s) Oral two times a day    MEDICATIONS  (PRN):  acetaminophen     Tablet .. 650 milliGRAM(s) Oral every 6 hours PRN Mild Pain (1 - 3)  aluminum hydroxide/magnesium hydroxide/simethicone Suspension 30 milliLiter(s) Oral every 4 hours PRN Dyspepsia  dextrose Oral Gel 15 Gram(s) Oral once PRN Blood Glucose LESS THAN 70 milliGRAM(s)/deciliter  melatonin 3 milliGRAM(s) Oral at bedtime PRN Insomnia      Allergies    No Known Allergies    Intolerances        REVIEW OF SYSTEMS      General:	    Skin/Breast:  	  Ophthalmologic:  	  ENMT:	    Respiratory and Thorax:  	  Cardiovascular:	    Gastrointestinal:	    Genitourinary:	    Musculoskeletal:	    Neurological:	    Psychiatric:	    Hematology/Lymphatics:	    Endocrine:	    Allergic/Immunologic:	    Vital Signs Last 24 Hrs  T(C): 36.4 (01 Jul 2023 05:10), Max: 36.7 (30 Jun 2023 15:34)  T(F): 97.5 (01 Jul 2023 05:10), Max: 98.1 (30 Jun 2023 15:34)  HR: 62 (01 Jul 2023 05:10) (61 - 62)  BP: 112/57 (01 Jul 2023 05:10) (107/68 - 148/65)  BP(mean): 94 (30 Jun 2023 21:22) (94 - 94)  RR: 18 (01 Jul 2023 05:10) (18 - 18)  SpO2: 97% (30 Jun 2023 21:22) (97% - 99%)    Parameters below as of 30 Jun 2023 20:30  Patient On (Oxygen Delivery Method): room air          Physical Exam    GENERAL: In no apparent distress, well nourished, and hydrated.  HEAD:  Atraumatic, Normocephalic  EYES: EOMI, PERRLA, conjunctiva and sclera clear  ENMT: No tonsillar erythema, exudates, or enlargements; ist mucous membranes, Good dentition, No lesions  NECK: Supple and normal thyroid.  No JVD or carotid bruit.  Carotid pulse is 2+ bilaterally.  HEART: Regular rate and rhythm; No murmurs, rubs, or gallops.  PULMONARY: Clear to auscultation and perfusion.  No rales, wheezing, or rhonchi bilaterally.  ABDOMEN: Soft, Nontender, Nondistended; Bowel sounds present  EXTREMITIES:  2+ Peripheral Pulses, No clubbing, cyanosis, or edema  LYMPH: No lymphadenopathy noted  NEUROLOGICAL: Grossly nonfocal    LABS:                        12.5   6.00  )-----------( 131      ( 01 Jul 2023 06:26 )             35.6     07-01    136  |  102  |  12  ----------------------------<  169<H>  4.2   |  23  |  1.0    Ca    8.5      01 Jul 2023 06:26  Mg     1.9     07-01    TPro  5.2<L>  /  Alb  3.5  /  TBili  0.6  /  DBili  x   /  AST  24  /  ALT  18  /  AlkPhos  79  06-30      Urinalysis Basic - ( 01 Jul 2023 06:26 )    Color: x / Appearance: x / SG: x / pH: x  Gluc: 169 mg/dL / Ketone: x  / Bili: x / Urobili: x   Blood: x / Protein: x / Nitrite: x   Leuk Esterase: x / RBC: x / WBC x   Sq Epi: x / Non Sq Epi: x / Bacteria: x        RADIOLOGY & ADDITIONAL TESTS:   INTERVAL HPI/OVERNIGHT EVENTS:  No acute events overnight  HD stable and feeling well    MEDICATIONS  (STANDING):  aspirin enteric coated 81 milliGRAM(s) Oral daily  atorvastatin 40 milliGRAM(s) Oral at bedtime  dextrose 5%. 1000 milliLiter(s) (100 mL/Hr) IV Continuous <Continuous>  dextrose 5%. 1000 milliLiter(s) (50 mL/Hr) IV Continuous <Continuous>  dextrose 50% Injectable 25 Gram(s) IV Push once  dextrose 50% Injectable 12.5 Gram(s) IV Push once  dextrose 50% Injectable 25 Gram(s) IV Push once  enoxaparin Injectable 90 milliGRAM(s) SubCutaneous every 12 hours  enoxaparin Injectable 50 milliGRAM(s) SubCutaneous once  glucagon  Injectable 1 milliGRAM(s) IntraMuscular once  insulin lispro (ADMELOG) corrective regimen sliding scale   SubCutaneous three times a day before meals  metoprolol tartrate 25 milliGRAM(s) Oral two times a day  pantoprazole    Tablet 40 milliGRAM(s) Oral before breakfast  regadenoson Injectable 0.4 milliGRAM(s) IV Push once  tamsulosin 0.4 milliGRAM(s) Oral two times a day    MEDICATIONS  (PRN):  acetaminophen     Tablet .. 650 milliGRAM(s) Oral every 6 hours PRN Mild Pain (1 - 3)  aluminum hydroxide/magnesium hydroxide/simethicone Suspension 30 milliLiter(s) Oral every 4 hours PRN Dyspepsia  dextrose Oral Gel 15 Gram(s) Oral once PRN Blood Glucose LESS THAN 70 milliGRAM(s)/deciliter  melatonin 3 milliGRAM(s) Oral at bedtime PRN Insomnia      Allergies    No Known Allergies    Intolerances        REVIEW OF SYSTEMS      General:	    Skin/Breast:  	  Ophthalmologic:  	  ENMT:	    Respiratory and Thorax:  	  Cardiovascular:	    Gastrointestinal:	    Genitourinary:	    Musculoskeletal:	    Neurological:	    Psychiatric:	    Hematology/Lymphatics:	    Endocrine:	    Allergic/Immunologic:	    Vital Signs Last 24 Hrs  T(C): 36.4 (01 Jul 2023 05:10), Max: 36.7 (30 Jun 2023 15:34)  T(F): 97.5 (01 Jul 2023 05:10), Max: 98.1 (30 Jun 2023 15:34)  HR: 62 (01 Jul 2023 05:10) (61 - 62)  BP: 112/57 (01 Jul 2023 05:10) (107/68 - 148/65)  BP(mean): 94 (30 Jun 2023 21:22) (94 - 94)  RR: 18 (01 Jul 2023 05:10) (18 - 18)  SpO2: 97% (30 Jun 2023 21:22) (97% - 99%)    Parameters below as of 30 Jun 2023 20:30  Patient On (Oxygen Delivery Method): room air          Physical Exam    GENERAL: In no apparent distress, well nourished, and hydrated.  HEAD:  Atraumatic, Normocephalic  EYES: EOMI, PERRLA, conjunctiva and sclera clear  ENMT: No tonsillar erythema, exudates, or enlargements; ist mucous membranes, Good dentition, No lesions  NECK: Supple and normal thyroid.  No JVD or carotid bruit.  Carotid pulse is 2+ bilaterally.  HEART: Regular rate and rhythm; No murmurs, rubs, or gallops.  PULMONARY: Clear to auscultation and perfusion.  No rales, wheezing, or rhonchi bilaterally.  ABDOMEN: Soft, Nontender, Nondistended; Bowel sounds present  EXTREMITIES:  2+ Peripheral Pulses, No clubbing, cyanosis, or edema  LYMPH: No lymphadenopathy noted  NEUROLOGICAL: Grossly nonfocal    LABS:                        12.5   6.00  )-----------( 131      ( 01 Jul 2023 06:26 )             35.6     07-01    136  |  102  |  12  ----------------------------<  169<H>  4.2   |  23  |  1.0    Ca    8.5      01 Jul 2023 06:26  Mg     1.9     07-01    TPro  5.2<L>  /  Alb  3.5  /  TBili  0.6  /  DBili  x   /  AST  24  /  ALT  18  /  AlkPhos  79  06-30      Urinalysis Basic - ( 01 Jul 2023 06:26 )    Color: x / Appearance: x / SG: x / pH: x  Gluc: 169 mg/dL / Ketone: x  / Bili: x / Urobili: x   Blood: x / Protein: x / Nitrite: x   Leuk Esterase: x / RBC: x / WBC x   Sq Epi: x / Non Sq Epi: x / Bacteria: x        RADIOLOGY & ADDITIONAL TESTS:   INTERVAL HPI/OVERNIGHT EVENTS:  No acute events overnight  HD stable and feeling well    MEDICATIONS  (STANDING):  aspirin enteric coated 81 milliGRAM(s) Oral daily  atorvastatin 40 milliGRAM(s) Oral at bedtime  dextrose 5%. 1000 milliLiter(s) (100 mL/Hr) IV Continuous <Continuous>  dextrose 5%. 1000 milliLiter(s) (50 mL/Hr) IV Continuous <Continuous>  dextrose 50% Injectable 25 Gram(s) IV Push once  dextrose 50% Injectable 12.5 Gram(s) IV Push once  dextrose 50% Injectable 25 Gram(s) IV Push once  enoxaparin Injectable 90 milliGRAM(s) SubCutaneous every 12 hours  enoxaparin Injectable 50 milliGRAM(s) SubCutaneous once  glucagon  Injectable 1 milliGRAM(s) IntraMuscular once  insulin lispro (ADMELOG) corrective regimen sliding scale   SubCutaneous three times a day before meals  metoprolol tartrate 25 milliGRAM(s) Oral two times a day  pantoprazole    Tablet 40 milliGRAM(s) Oral before breakfast  regadenoson Injectable 0.4 milliGRAM(s) IV Push once  tamsulosin 0.4 milliGRAM(s) Oral two times a day    MEDICATIONS  (PRN):  acetaminophen     Tablet .. 650 milliGRAM(s) Oral every 6 hours PRN Mild Pain (1 - 3)  aluminum hydroxide/magnesium hydroxide/simethicone Suspension 30 milliLiter(s) Oral every 4 hours PRN Dyspepsia  dextrose Oral Gel 15 Gram(s) Oral once PRN Blood Glucose LESS THAN 70 milliGRAM(s)/deciliter  melatonin 3 milliGRAM(s) Oral at bedtime PRN Insomnia      Allergies    No Known Allergies    Intolerances        REVIEW OF SYSTEMS      General:	    Skin/Breast:  	  Ophthalmologic:  	  ENMT:	    Respiratory and Thorax:  	  Cardiovascular:	    Gastrointestinal:	    Genitourinary:	    Musculoskeletal:	    Neurological:	    Psychiatric:	    Hematology/Lymphatics:	    Endocrine:	    Allergic/Immunologic:	    Vital Signs Last 24 Hrs  T(C): 36.4 (01 Jul 2023 05:10), Max: 36.7 (30 Jun 2023 15:34)  T(F): 97.5 (01 Jul 2023 05:10), Max: 98.1 (30 Jun 2023 15:34)  HR: 62 (01 Jul 2023 05:10) (61 - 62)  BP: 112/57 (01 Jul 2023 05:10) (107/68 - 148/65)  BP(mean): 94 (30 Jun 2023 21:22) (94 - 94)  RR: 18 (01 Jul 2023 05:10) (18 - 18)  SpO2: 97% (30 Jun 2023 21:22) (97% - 99%)    Parameters below as of 30 Jun 2023 20:30  Patient On (Oxygen Delivery Method): room air          Physical Exam    GENERAL: In no apparent distress, well nourished, and hydrated.  HEAD:  Atraumatic, Normocephalic  EYES: EOMI, PERRLA, conjunctiva and sclera clear  ENMT: MMM  NECK: Supple  HEART: Regular rate and rhythm; No murmurs, rubs, or gallops.  PULMONARY: Clear to auscultation and perfusion.  No rales, wheezing, or rhonchi bilaterally.  ABDOMEN: Soft, Nontender, Nondistended; Bowel sounds present  EXTREMITIES:  2+ Peripheral Pulses, No clubbing, cyanosis, or edema   NEUROLOGICAL: Grossly nonfocal    LABS:                        12.5   6.00  )-----------( 131      ( 01 Jul 2023 06:26 )             35.6     07-01    136  |  102  |  12  ----------------------------<  169<H>  4.2   |  23  |  1.0    Ca    8.5      01 Jul 2023 06:26  Mg     1.9     07-01    TPro  5.2<L>  /  Alb  3.5  /  TBili  0.6  /  DBili  x   /  AST  24  /  ALT  18  /  AlkPhos  79  06-30      Urinalysis Basic - ( 01 Jul 2023 06:26 )    Color: x / Appearance: x / SG: x / pH: x  Gluc: 169 mg/dL / Ketone: x  / Bili: x / Urobili: x   Blood: x / Protein: x / Nitrite: x   Leuk Esterase: x / RBC: x / WBC x   Sq Epi: x / Non Sq Epi: x / Bacteria: x        RADIOLOGY & ADDITIONAL TESTS:

## 2023-07-01 NOTE — DISCHARGE NOTE PROVIDER - HOSPITAL COURSE
Case of 83-year-old male with PMHx of HTN, DM, DLD, GERD, prostate cancer (sp seed implant in ), pAfib, Hx of  SVT follows with Dr. Motta presenting to the ED for evaluation of acute onset shortness of breath that began this morning.    Patient states he got out of bed and went to the bathroom and was having difficulty ambulating to bathroom secondary to shortness of breath at around 5am. He went back to sleep then woke up with the same complaint, 911 called.  No chest pain, no fall, no other complaints, fever, chills, headache, changes in vision, cough, congestion, n/v/d, abd pain, constipation, urinary complaints, lower extremity pain/swelling.    During transportation with EMT, he was in svt on ekg, given 6 and 12 adenosine, and did not convert, was  hypotensive, cardioverted with 100J and 100 ketamine. Converted to sinus rhythm without complaints.    No clear inciting event for such SVT.      In the hospital patient was evaluated by cardiac electrophysiology and they recommended the followin. Anticoagulation: CHADS-VASC score of 4 should be initiated on anticoagulation if no contraindications exist  2. Rate control: On Metoprolol which can be titrated as tolerated  3: Please obtain an echocardiogram>>>which was normal  4: Consider an ischemic work up   5: As there is no documented evidence of the arrythmia occurrence would suggest an internal loop recorder implant prior to discharge.>>>> patient did not want to stay for ILR implantation    The patient was also seen by cardio and they recommended:  - Continue telemetry.  - Agree with ILR given potential anticoagulation indication.  - Complete MPI / further CAD evaluation pending results  - Continue Metoprolol / monitor prolonger DE on EKG .    The results of the stress test showed------?         Case of 83-year-old male with PMHx of HTN, DM, DLD, GERD, prostate cancer (sp seed implant in ), pAfib, Hx of  SVT follows with Dr. Motta presenting to the ED for evaluation of acute onset shortness of breath that began this morning.    Patient states he got out of bed and went to the bathroom and was having difficulty ambulating to bathroom secondary to shortness of breath at around 5am. He went back to sleep then woke up with the same complaint, 911 called.  No chest pain, no fall, no other complaints, fever, chills, headache, changes in vision, cough, congestion, n/v/d, abd pain, constipation, urinary complaints, lower extremity pain/swelling.    During transportation with EMT, he was in svt on ekg, given 6 and 12 adenosine, and did not convert, was  hypotensive, cardioverted with 100J and 100 ketamine. Converted to sinus rhythm without complaints.    No clear inciting event for such SVT.      In the hospital patient was evaluated by cardiac electrophysiology and they recommended the followin. Anticoagulation: CHADS-VASC score of 4 should be initiated on anticoagulation if no contraindications exist  2. Rate control: On Metoprolol which can be titrated as tolerated  3: Please obtain an echocardiogram>>>which was normal  4: Consider an ischemic work up   5: As there is no documented evidence of the arrythmia occurrence would suggest an internal loop recorder implant prior to discharge.>>>> patient did not want to stay for ILR implantation  seen by EP and is ok for discharge with o/p follow up for ILR  The patient was also seen by cardio and they recommended:  - Agree with ILR given potential anticoagulation indication.  - Complete MPI / further CAD evaluation pending results  - Continue Metoprolol / monitor prolonger GA on EKG .  - patient refusing anticoagulation for A FIB, understand risk of clot/stroke, states he does not want anticoagulation    The results of the stress test showed-1. NORMAL LEXISCAN / REST MYOCARDIAL PERFUSION TOMOGRAPHY, WITH NO   EVIDENCE FOR ISCHEMIA DURING LEXISCAN INFUSION.  2. NORMAL RESTING LEFT VENTRICULAR WALL MOTION AND WALL THICKENING.  3. LEFT VENTRICULAR EJECTION FRACTION OF  58 % WHICH IS WITHIN RANGE OF   NORMAL.

## 2023-07-01 NOTE — DISCHARGE NOTE PROVIDER - CARE PROVIDER_API CALL
Veto Williamson  Internal Medicine  [address] [place] [address]  Phone: [phone]  Fax: [phone]  Follow Up Time: 2 weeks    Joe Bahena  Interventional Cardiology  [address] [place] [address], [facility] [address]  [place] [address]  Phone: [phone]  Fax: [phone]  Follow Up Time: 2 weeks    Marium Joaquin  Cardiovascular Disease  [address] [place] [address]  Phone: [phone]  Fax: [phone]  Follow Up Time: 2 weeks

## 2023-07-01 NOTE — DISCHARGE NOTE PROVIDER - NSDCCPCAREPLAN_GEN_ALL_CORE_FT
PRINCIPAL DISCHARGE DIAGNOSIS  Diagnosis: SVT (supraventricular tachycardia)  Assessment and Plan of Treatment: Supraventricular Tachycardia  Supraventricular tachycardia (SVT) is a type of abnormal heart rhythm that causes your heart to beat very quickly. A normal heart rate is 60?100 beats per minute. During an episode of SVT, your heart rate may be 150?250 beats per minute. This can make you feel light-headed and short of breath. Although SVT is usually harmless, when SVT happens often or it lasts for long periods, it can lead to heart weakness and failure. SVT can be triggered by stress, smoking, alcohol, caffeine, or stimulant drugs. Treatment may involve certain maneuvers, medicines, or electric shock (cardioversion).   SEEK IMMEDIATE MEDICAL CARE IF YOU HAVE ANY OF THE FOLLOWING SYMPTOMS: chest pain, shortness of breath, dizziness/lightheadedness, or fainting.

## 2023-07-01 NOTE — DISCHARGE NOTE PROVIDER - PROVIDER TOKENS
PROVIDER:[TOKEN:[[id number]],FOLLOWUP:[2 weeks]],PROVIDER:[TOKEN:[[id number]],FOLLOWUP:[2 weeks]],PROVIDER:[TOKEN:[[id number]],FOLLOWUP:[2 weeks]]

## 2023-07-01 NOTE — DISCHARGE NOTE PROVIDER - ATTENDING DISCHARGE PHYSICAL EXAMINATION:
PHYSICAL EXAM:  GENERAL: NAD, speaks in full sentences, no signs of respiratory distress  HEAD:  Atraumatic, Normocephalic  EYES: EOMI, PERRLA, conjunctiva and sclera clear  NECK: Supple, No JVD  CHEST/LUNG: Clear to auscultation bilaterally; No wheeze; No crackles; No accessory muscles used  HEART: Irregular; No murmurs;   ABDOMEN: Soft, Nontender, Nondistended; Bowel sounds present; No guarding  EXTREMITIES:  2+ Peripheral Pulses, No cyanosis or edema  PSYCH: AAOx3  NEUROLOGY: non-focal  SKIN: No rashes or lesions

## 2023-07-01 NOTE — DISCHARGE NOTE NURSING/CASE MANAGEMENT/SOCIAL WORK - PATIENT PORTAL LINK FT
You can access the FollowMyHealth Patient Portal offered by [facility] by registering at the following website: [email or web address]. By joining [employer]’s FollowMyHealth portal, you will also be able to view your health information using other applications (apps) compatible with our system.

## 2023-07-01 NOTE — PROGRESS NOTE ADULT - ASSESSMENT
Cardio: Dr. Bobby Parker     83-year-old male with PMHx of HTN, DM, DLD, GERD, prostate cancer (status post seed implant in 2008), c-spine fusion, paroxysmal atrial fibrillation not on anticoagulation (unknown reason) follows with Dr. Haider for cardiology, primary care physician Veto Prather MD  presented to the ED for evaluation of acute onset shortness of breath that began this morning for which EMS was called. During transportation with EMT noted the patient to have tachycardia on ekg for which he was given 6 and 12 adenosine which did not convert his rhythm but he became hypotensive and was cardioverted with 100J and 100 ketamine and converted to sinus rhythm. Electrophysiology was consulted for further recommendations. The patient was seen and evaluated. There is no echocardiogram and no recent ischemic work up. He states to have been diagnosed with atrial fibrillation back in 1994 however was never initiated on anticoagulation and nothing was mentioned further about it on follow ups according to the patient. 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 There is no documentation of the event that took place by EMS so we are unable to determine what arrythmia the patient had at the time.     Impression:  SVT  PAF (no OAC)  HTN  DM  HLD    Plan:  - Nuclear stress test  - If NST normal, patient can be discharged home and will schedule ILR to be placed as outpatient next week  - Patient does not need to stay in the hospital for loop on Monday  - Cont tele monitoring while in hospital  - Monitor electrolytes, maintain WNL  - Follow up with Dr Joaquin (office will call pt with appt)

## 2023-07-02 ENCOUNTER — INPATIENT (INPATIENT)
Facility: HOSPITAL | Age: 83
LOS: 4 days | Discharge: HOME | DRG: 871 | End: 2023-07-07
Attending: INTERNAL MEDICINE | Admitting: INTERNAL MEDICINE
Payer: MEDICARE

## 2023-07-02 VITALS
SYSTOLIC BLOOD PRESSURE: 136 MMHG | TEMPERATURE: 98 F | OXYGEN SATURATION: 98 % | HEIGHT: 73 IN | HEART RATE: 118 BPM | DIASTOLIC BLOOD PRESSURE: 74 MMHG | WEIGHT: 199.96 LBS | RESPIRATION RATE: 18 BRPM

## 2023-07-02 DIAGNOSIS — R06.02 SHORTNESS OF BREATH: ICD-10-CM

## 2023-07-02 DIAGNOSIS — Z98.1 ARTHRODESIS STATUS: Chronic | ICD-10-CM

## 2023-07-02 PROBLEM — I10 ESSENTIAL (PRIMARY) HYPERTENSION: Chronic | Status: ACTIVE | Noted: 2023-06-29

## 2023-07-02 PROBLEM — E11.9 TYPE 2 DIABETES MELLITUS WITHOUT COMPLICATIONS: Chronic | Status: ACTIVE | Noted: 2023-06-29

## 2023-07-02 PROBLEM — C61 MALIGNANT NEOPLASM OF PROSTATE: Chronic | Status: ACTIVE | Noted: 2023-06-29

## 2023-07-02 PROBLEM — I48.0 PAROXYSMAL ATRIAL FIBRILLATION: Chronic | Status: ACTIVE | Noted: 2023-06-29

## 2023-07-02 LAB
ALBUMIN SERPL ELPH-MCNC: 3.9 G/DL — SIGNIFICANT CHANGE UP (ref 3.5–5.2)
ALP SERPL-CCNC: 88 U/L — SIGNIFICANT CHANGE UP (ref 30–115)
ALT FLD-CCNC: 26 U/L — SIGNIFICANT CHANGE UP (ref 0–41)
ANION GAP SERPL CALC-SCNC: 13 MMOL/L — SIGNIFICANT CHANGE UP (ref 7–14)
APPEARANCE UR: CLEAR — SIGNIFICANT CHANGE UP
AST SERPL-CCNC: 39 U/L — SIGNIFICANT CHANGE UP (ref 0–41)
B-OH-BUTYR SERPL-SCNC: <0.2 MMOL/L — SIGNIFICANT CHANGE UP
BACTERIA # UR AUTO: NEGATIVE — SIGNIFICANT CHANGE UP
BASE EXCESS BLDV CALC-SCNC: -1.1 MMOL/L — SIGNIFICANT CHANGE UP (ref -2–3)
BASOPHILS # BLD AUTO: 0.01 K/UL — SIGNIFICANT CHANGE UP (ref 0–0.2)
BASOPHILS NFR BLD AUTO: 0.2 % — SIGNIFICANT CHANGE UP (ref 0–1)
BILIRUB SERPL-MCNC: 0.5 MG/DL — SIGNIFICANT CHANGE UP (ref 0.2–1.2)
BILIRUB UR-MCNC: NEGATIVE — SIGNIFICANT CHANGE UP
BUN SERPL-MCNC: 16 MG/DL — SIGNIFICANT CHANGE UP (ref 10–20)
CA-I SERPL-SCNC: 1.25 MMOL/L — SIGNIFICANT CHANGE UP (ref 1.15–1.33)
CALCIUM SERPL-MCNC: 9.1 MG/DL — SIGNIFICANT CHANGE UP (ref 8.4–10.5)
CHLORIDE SERPL-SCNC: 101 MMOL/L — SIGNIFICANT CHANGE UP (ref 98–110)
CO2 SERPL-SCNC: 22 MMOL/L — SIGNIFICANT CHANGE UP (ref 17–32)
COLOR SPEC: YELLOW — SIGNIFICANT CHANGE UP
CREAT SERPL-MCNC: 1.1 MG/DL — SIGNIFICANT CHANGE UP (ref 0.7–1.5)
CRP SERPL-MCNC: 25 MG/L — HIGH
D DIMER BLD IA.RAPID-MCNC: 297 NG/ML DDU — HIGH
DIFF PNL FLD: NEGATIVE — SIGNIFICANT CHANGE UP
EGFR: 67 ML/MIN/1.73M2 — SIGNIFICANT CHANGE UP
EOSINOPHIL # BLD AUTO: 0.07 K/UL — SIGNIFICANT CHANGE UP (ref 0–0.7)
EOSINOPHIL NFR BLD AUTO: 1.3 % — SIGNIFICANT CHANGE UP (ref 0–8)
EPI CELLS # UR: 0 /HPF — SIGNIFICANT CHANGE UP (ref 0–5)
ERYTHROCYTE [SEDIMENTATION RATE] IN BLOOD: 12 MM/HR — HIGH (ref 0–10)
FLUAV AG NPH QL: SIGNIFICANT CHANGE UP
FLUBV AG NPH QL: SIGNIFICANT CHANGE UP
GAS PNL BLDV: 130 MMOL/L — LOW (ref 136–145)
GAS PNL BLDV: SIGNIFICANT CHANGE UP
GLUCOSE BLDC GLUCOMTR-MCNC: 164 MG/DL — HIGH (ref 70–99)
GLUCOSE SERPL-MCNC: 306 MG/DL — HIGH (ref 70–99)
GLUCOSE UR QL: ABNORMAL
HCO3 BLDV-SCNC: 24 MMOL/L — SIGNIFICANT CHANGE UP (ref 22–29)
HCT VFR BLD CALC: 38.7 % — LOW (ref 42–52)
HCT VFR BLDA CALC: 41 % — SIGNIFICANT CHANGE UP (ref 39–51)
HGB BLD CALC-MCNC: 13.7 G/DL — SIGNIFICANT CHANGE UP (ref 12.6–17.4)
HGB BLD-MCNC: 13.5 G/DL — LOW (ref 14–18)
HYALINE CASTS # UR AUTO: 0 /LPF — SIGNIFICANT CHANGE UP (ref 0–7)
IMM GRANULOCYTES NFR BLD AUTO: 0.6 % — HIGH (ref 0.1–0.3)
KETONES UR-MCNC: SIGNIFICANT CHANGE UP
LACTATE BLDV-MCNC: 3.9 MMOL/L — HIGH (ref 0.5–2)
LACTATE SERPL-SCNC: 2.1 MMOL/L — HIGH (ref 0.7–2)
LEUKOCYTE ESTERASE UR-ACNC: NEGATIVE — SIGNIFICANT CHANGE UP
LYMPHOCYTES # BLD AUTO: 0.4 K/UL — LOW (ref 1.2–3.4)
LYMPHOCYTES # BLD AUTO: 7.4 % — LOW (ref 20.5–51.1)
MCHC RBC-ENTMCNC: 30.7 PG — SIGNIFICANT CHANGE UP (ref 27–31)
MCHC RBC-ENTMCNC: 34.9 G/DL — SIGNIFICANT CHANGE UP (ref 32–37)
MCV RBC AUTO: 88 FL — SIGNIFICANT CHANGE UP (ref 80–94)
MONOCYTES # BLD AUTO: 0.68 K/UL — HIGH (ref 0.1–0.6)
MONOCYTES NFR BLD AUTO: 12.5 % — HIGH (ref 1.7–9.3)
NEUTROPHILS # BLD AUTO: 4.23 K/UL — SIGNIFICANT CHANGE UP (ref 1.4–6.5)
NEUTROPHILS NFR BLD AUTO: 78 % — HIGH (ref 42.2–75.2)
NITRITE UR-MCNC: NEGATIVE — SIGNIFICANT CHANGE UP
NRBC # BLD: 0 /100 WBCS — SIGNIFICANT CHANGE UP (ref 0–0)
NT-PROBNP SERPL-SCNC: 321 PG/ML — HIGH (ref 0–300)
PCO2 BLDV: 42 MMHG — SIGNIFICANT CHANGE UP (ref 42–55)
PH BLDV: 7.37 — SIGNIFICANT CHANGE UP (ref 7.32–7.43)
PH UR: 7 — SIGNIFICANT CHANGE UP (ref 5–8)
PLATELET # BLD AUTO: 106 K/UL — LOW (ref 130–400)
PMV BLD: 12.3 FL — HIGH (ref 7.4–10.4)
PO2 BLDV: 29 MMHG — SIGNIFICANT CHANGE UP
POTASSIUM BLDV-SCNC: 4.6 MMOL/L — SIGNIFICANT CHANGE UP (ref 3.5–5.1)
POTASSIUM SERPL-MCNC: 5.2 MMOL/L — HIGH (ref 3.5–5)
POTASSIUM SERPL-SCNC: 5.2 MMOL/L — HIGH (ref 3.5–5)
PROT SERPL-MCNC: 6.2 G/DL — SIGNIFICANT CHANGE UP (ref 6–8)
PROT UR-MCNC: ABNORMAL
RBC # BLD: 4.4 M/UL — LOW (ref 4.7–6.1)
RBC # FLD: 12.9 % — SIGNIFICANT CHANGE UP (ref 11.5–14.5)
RBC CASTS # UR COMP ASSIST: 2 /HPF — SIGNIFICANT CHANGE UP (ref 0–4)
RSV RNA NPH QL NAA+NON-PROBE: SIGNIFICANT CHANGE UP
SAO2 % BLDV: 46.3 % — SIGNIFICANT CHANGE UP
SARS-COV-2 RNA SPEC QL NAA+PROBE: DETECTED
SODIUM SERPL-SCNC: 136 MMOL/L — SIGNIFICANT CHANGE UP (ref 135–146)
SP GR SPEC: >1.05 (ref 1.01–1.03)
TROPONIN T SERPL-MCNC: <0.01 NG/ML — SIGNIFICANT CHANGE UP
UROBILINOGEN FLD QL: SIGNIFICANT CHANGE UP
WBC # BLD: 5.42 K/UL — SIGNIFICANT CHANGE UP (ref 4.8–10.8)
WBC # FLD AUTO: 5.42 K/UL — SIGNIFICANT CHANGE UP (ref 4.8–10.8)
WBC UR QL: 0 /HPF — SIGNIFICANT CHANGE UP (ref 0–5)

## 2023-07-02 PROCEDURE — 87040 BLOOD CULTURE FOR BACTERIA: CPT

## 2023-07-02 PROCEDURE — 83550 IRON BINDING TEST: CPT

## 2023-07-02 PROCEDURE — 99223 1ST HOSP IP/OBS HIGH 75: CPT

## 2023-07-02 PROCEDURE — 80048 BASIC METABOLIC PNL TOTAL CA: CPT

## 2023-07-02 PROCEDURE — 86140 C-REACTIVE PROTEIN: CPT

## 2023-07-02 PROCEDURE — 80061 LIPID PANEL: CPT

## 2023-07-02 PROCEDURE — 85027 COMPLETE CBC AUTOMATED: CPT

## 2023-07-02 PROCEDURE — 99285 EMERGENCY DEPT VISIT HI MDM: CPT | Mod: GC

## 2023-07-02 PROCEDURE — 36415 COLL VENOUS BLD VENIPUNCTURE: CPT

## 2023-07-02 PROCEDURE — 93970 EXTREMITY STUDY: CPT

## 2023-07-02 PROCEDURE — 93010 ELECTROCARDIOGRAM REPORT: CPT

## 2023-07-02 PROCEDURE — 84145 PROCALCITONIN (PCT): CPT

## 2023-07-02 PROCEDURE — 85025 COMPLETE CBC W/AUTO DIFF WBC: CPT

## 2023-07-02 PROCEDURE — 97161 PT EVAL LOW COMPLEX 20 MIN: CPT | Mod: GP

## 2023-07-02 PROCEDURE — 82728 ASSAY OF FERRITIN: CPT

## 2023-07-02 PROCEDURE — 93005 ELECTROCARDIOGRAM TRACING: CPT

## 2023-07-02 PROCEDURE — 71045 X-RAY EXAM CHEST 1 VIEW: CPT | Mod: 26

## 2023-07-02 PROCEDURE — 83540 ASSAY OF IRON: CPT

## 2023-07-02 PROCEDURE — 82962 GLUCOSE BLOOD TEST: CPT

## 2023-07-02 PROCEDURE — 85652 RBC SED RATE AUTOMATED: CPT

## 2023-07-02 PROCEDURE — 71275 CT ANGIOGRAPHY CHEST: CPT | Mod: 26,MA

## 2023-07-02 PROCEDURE — 83605 ASSAY OF LACTIC ACID: CPT

## 2023-07-02 PROCEDURE — 83735 ASSAY OF MAGNESIUM: CPT

## 2023-07-02 PROCEDURE — 85379 FIBRIN DEGRADATION QUANT: CPT

## 2023-07-02 RX ORDER — DEXTROSE 50 % IN WATER 50 %
15 SYRINGE (ML) INTRAVENOUS ONCE
Refills: 0 | Status: DISCONTINUED | OUTPATIENT
Start: 2023-07-02 | End: 2023-07-07

## 2023-07-02 RX ORDER — METOPROLOL TARTRATE 50 MG
25 TABLET ORAL EVERY 8 HOURS
Refills: 0 | Status: DISCONTINUED | OUTPATIENT
Start: 2023-07-02 | End: 2023-07-07

## 2023-07-02 RX ORDER — ASPIRIN/CALCIUM CARB/MAGNESIUM 324 MG
81 TABLET ORAL DAILY
Refills: 0 | Status: DISCONTINUED | OUTPATIENT
Start: 2023-07-02 | End: 2023-07-07

## 2023-07-02 RX ORDER — SODIUM CHLORIDE 9 MG/ML
500 INJECTION, SOLUTION INTRAVENOUS ONCE
Refills: 0 | Status: COMPLETED | OUTPATIENT
Start: 2023-07-02 | End: 2023-07-02

## 2023-07-02 RX ORDER — ATORVASTATIN CALCIUM 80 MG/1
40 TABLET, FILM COATED ORAL AT BEDTIME
Refills: 0 | Status: DISCONTINUED | OUTPATIENT
Start: 2023-07-02 | End: 2023-07-07

## 2023-07-02 RX ORDER — SODIUM CHLORIDE 9 MG/ML
1000 INJECTION, SOLUTION INTRAVENOUS
Refills: 0 | Status: DISCONTINUED | OUTPATIENT
Start: 2023-07-02 | End: 2023-07-07

## 2023-07-02 RX ORDER — ONDANSETRON 8 MG/1
4 TABLET, FILM COATED ORAL EVERY 8 HOURS
Refills: 0 | Status: DISCONTINUED | OUTPATIENT
Start: 2023-07-02 | End: 2023-07-07

## 2023-07-02 RX ORDER — ACETAMINOPHEN 500 MG
975 TABLET ORAL ONCE
Refills: 0 | Status: COMPLETED | OUTPATIENT
Start: 2023-07-02 | End: 2023-07-02

## 2023-07-02 RX ORDER — GLUCAGON INJECTION, SOLUTION 0.5 MG/.1ML
1 INJECTION, SOLUTION SUBCUTANEOUS ONCE
Refills: 0 | Status: DISCONTINUED | OUTPATIENT
Start: 2023-07-02 | End: 2023-07-07

## 2023-07-02 RX ORDER — ACETAMINOPHEN 500 MG
650 TABLET ORAL EVERY 6 HOURS
Refills: 0 | Status: DISCONTINUED | OUTPATIENT
Start: 2023-07-02 | End: 2023-07-07

## 2023-07-02 RX ORDER — TAMSULOSIN HYDROCHLORIDE 0.4 MG/1
0.4 CAPSULE ORAL AT BEDTIME
Refills: 0 | Status: DISCONTINUED | OUTPATIENT
Start: 2023-07-02 | End: 2023-07-07

## 2023-07-02 RX ORDER — VANCOMYCIN HCL 1 G
1000 VIAL (EA) INTRAVENOUS ONCE
Refills: 0 | Status: COMPLETED | OUTPATIENT
Start: 2023-07-02 | End: 2023-07-02

## 2023-07-02 RX ORDER — PANTOPRAZOLE SODIUM 20 MG/1
40 TABLET, DELAYED RELEASE ORAL
Refills: 0 | Status: DISCONTINUED | OUTPATIENT
Start: 2023-07-02 | End: 2023-07-07

## 2023-07-02 RX ORDER — DEXTROSE 50 % IN WATER 50 %
25 SYRINGE (ML) INTRAVENOUS ONCE
Refills: 0 | Status: DISCONTINUED | OUTPATIENT
Start: 2023-07-02 | End: 2023-07-07

## 2023-07-02 RX ORDER — INSULIN LISPRO 100/ML
VIAL (ML) SUBCUTANEOUS
Refills: 0 | Status: DISCONTINUED | OUTPATIENT
Start: 2023-07-02 | End: 2023-07-07

## 2023-07-02 RX ORDER — CEFEPIME 1 G/1
2000 INJECTION, POWDER, FOR SOLUTION INTRAMUSCULAR; INTRAVENOUS ONCE
Refills: 0 | Status: COMPLETED | OUTPATIENT
Start: 2023-07-02 | End: 2023-07-02

## 2023-07-02 RX ORDER — LANOLIN ALCOHOL/MO/W.PET/CERES
3 CREAM (GRAM) TOPICAL AT BEDTIME
Refills: 0 | Status: DISCONTINUED | OUTPATIENT
Start: 2023-07-02 | End: 2023-07-07

## 2023-07-02 RX ADMIN — TAMSULOSIN HYDROCHLORIDE 0.4 MILLIGRAM(S): 0.4 CAPSULE ORAL at 22:17

## 2023-07-02 RX ADMIN — Medication 975 MILLIGRAM(S): at 18:17

## 2023-07-02 RX ADMIN — Medication 25 MILLIGRAM(S): at 22:22

## 2023-07-02 RX ADMIN — SODIUM CHLORIDE 500 MILLILITER(S): 9 INJECTION, SOLUTION INTRAVENOUS at 11:55

## 2023-07-02 RX ADMIN — Medication 250 MILLIGRAM(S): at 11:55

## 2023-07-02 RX ADMIN — CEFEPIME 100 MILLIGRAM(S): 1 INJECTION, POWDER, FOR SOLUTION INTRAMUSCULAR; INTRAVENOUS at 11:20

## 2023-07-02 RX ADMIN — Medication 975 MILLIGRAM(S): at 15:59

## 2023-07-02 RX ADMIN — ATORVASTATIN CALCIUM 40 MILLIGRAM(S): 80 TABLET, FILM COATED ORAL at 22:22

## 2023-07-02 NOTE — ED PROVIDER NOTE - CLINICAL SUMMARY MEDICAL DECISION MAKING FREE TEXT BOX
83-year-old male with past medical history of high blood pressure, diabetes, hyperlipidemia, GERD, prostate cancer status post seed implant in 2008, atrial fibrillation, SVT, follows with Dr. Ángela Tinoco from June 29 to July 1 for shortness of breath, was in SVT for which he was given meds with no response and then electrocardioverted with response seen by EP (was recommended to have ILR implantation but did not want to stay for that so was to follow-up with EP as an outpatient, refused anticoagulation for atrial fibrillation despite knowing the risks) presents today for shortness of breath on exertion.  Patient reports that shortness has improved since his initial admission but is still present and is unable to tolerate exerting himself.  Patient had a CT angio chest for PE evaluation on June 30, stress test on June 30, as well as an echo, with no significant findings.  Ex-smoker.  Quit approximately 45 years ago.    No fever, chills, n/v, cp,  pleuritic cp, palpitations, diaphoresis, cough, ha/lh/dizziness, numbness/tingling, neck pain/ stiffness, abd pain, diarrhea, constipation, melena/brbpr, urinary symptoms, trauma, weakness, edema, calf pain/swelling/erythema, sick contacts, recent travel or rash.    On exam:  Vital Signs: I have reviewed the initial vital signs. Constitutional: Non toxic appearing pt sitting on stretcher speaking full sentences but becomes shortness of breath and has to pause to continue his sentence.  Saturation 98 on room air.. Integumentary: No rash. ENT: MMM NECK: Supple, non-tender, no meningeal signs. Cardiovascular: RRR, radial pulses 2/4 b/l. No JVD. Respiratory: BS present b/l, ctabl, no wheezing or crackles, no accessory muscle use, no stridor. Gastrointestinal: BS present throughout all 4 quadrants, soft, nd, nt, no rebound tenderness or guarding, no cvat. Musculoskeletal: FROM, no edema, no calf pain/swelling/erythema. Neurologic: AAOx3, motor 5/5 and sensation intact throughout upper and lowe ext, CN II-XII intact, No facial droop or slurring of speech. No focal deficits.    Plan: Monitor, EKG. CXR, labs, EP consult, reassess.     Labs and EKG were ordered and reviewed.  Imaging was ordered and reviewed by me.  Appropriate medications for patient's presenting complaints were ordered and effects were reassessed.  Patient's records (prior hospital, ED visit, ) were reviewed.  Additional history was obtained from wife. 83-year-old male with past medical history of high blood pressure, diabetes, hyperlipidemia, GERD, prostate cancer status post seed implant in 2008, atrial fibrillation, SVT, follows with Dr. Ángela Tinoco from June 29 to July 1 for shortness of breath, was in SVT for which he was given meds with no response and then electrocardioverted with response seen by EP (was recommended to have ILR implantation but did not want to stay for that so was to follow-up with EP as an outpatient, refused anticoagulation for atrial fibrillation despite knowing the risks) presents today for shortness of breath on exertion.  Patient reports that shortness has improved since his initial admission but is still present and is unable to tolerate exerting himself.  Patient had a CT angio chest for PE evaluation on June 30, stress test on June 30, as well as an echo, with no significant findings.  Ex-smoker.  Quit approximately 45 years ago.    No fever, chills, n/v, cp,  pleuritic cp, palpitations, diaphoresis, cough, ha/lh/dizziness, numbness/tingling, neck pain/ stiffness, abd pain, diarrhea, constipation, melena/brbpr, urinary symptoms, trauma, weakness, edema, calf pain/swelling/erythema, sick contacts, recent travel or rash.    On exam:  Vital Signs: I have reviewed the initial vital signs. Constitutional: Non toxic appearing pt sitting on stretcher speaking full sentences but becomes shortness of breath and has to pause to continue his sentence.  Saturation 98 on room air.. Integumentary: No rash. ENT: MMM NECK: Supple, non-tender, no meningeal signs. Cardiovascular: RRR, radial pulses 2/4 b/l. No JVD. Respiratory: BS present b/l, ctabl, no wheezing or crackles, no accessory muscle use, no stridor. Gastrointestinal: BS present throughout all 4 quadrants, soft, nd, nt, no rebound tenderness or guarding, no cvat. Musculoskeletal: FROM, no edema, no calf pain/swelling/erythema. Neurologic: AAOx3, motor 5/5 and sensation intact throughout upper and lowe ext, CN II-XII intact, No facial droop or slurring of speech. No focal deficits.    Plan: Monitor, EKG. CXR, labs, EP consult, reassess.     Labs and EKG were ordered and reviewed.  Imaging was ordered and reviewed by me.  Appropriate medications for patient's presenting complaints were ordered and effects were reassessed.  Patient's records (prior hospital, ED visit, ) were reviewed.  Additional history was obtained from wife. Escalation to admission/observation was considered. Patient requires inpatient hospitalization - monitored setting.  I have fully discussed the medical management and delivery of care with the patient. I have discussed any available labs, imaging and treatment options with the patient.  Pt admitted for further care & management.  Patient and family aware of all results, treated for for fever, gentle IV fluids due to concern of CHF, antibiotics given, imaging reviewed, EP was made aware of patient, report no intervention at this time, patient was scheduled for procedure on Thursday patient being treated for fever at this time.  Medical admitting team aware of patient admission.

## 2023-07-02 NOTE — H&P ADULT - NSHPPHYSICALEXAM_GEN_ALL_CORE
CONSTITUTIONAL: NAD, appears younger than stated age    EYES: PERRLA and symmetric, EOMI, No conjunctival or scleral injection, non-icteric    ENMT: Oral mucosa with moist membranes. No external nasal lesions; normal dentition; no gross hearing impairment noted.    NECK: Supple, symmetric and without tracheal deviation; thyroid gland not enlarged and without palpable masses    RESPIRATORY: No respiratory distress, no use of accessory muscles; CTA b/l, no wheezes, rales or rhonchi, no dullness or hyperresonance to percussion, no tactile fremitus, no subcutaneous emphysema    CARDIOVASCULAR: regular tachy, +S1S2, no murmur appreciated, no rubs, no gallops; no JVD; no peripheral edema    Vascular: no carotid bruits; carotid pulse palpable, radial pulse palpable, posterior tibialis pulse palpable    GASTROINTESTINAL: Soft, non tender, non distended, no rebound, no guarding; No palpable masses; no hepatosplenomegaly; no hernia palpated;    MUSCULOSKELETAL: no significant limitation on ROM, moves all extremities in plane of bed    SKIN: No rashes or ulcers noted; no subcutaneous nodules or induration palpable    NEUROLOGIC: moves all extremities against resistance, no droop    PSYCHIATRIC: Appropriate insight/judgment; A+O x 3, mood and affect appropriate, recent/remote memory intact

## 2023-07-02 NOTE — ED PROVIDER NOTE - DIFFERENTIAL DIAGNOSIS
svt, arrythmia, stemi, nstemi, pna, ptx, electrolyte vs metabolic etiology Differential Diagnosis svt, arrythmia, stemi, nstemi, pna, ptx, uti, chf, electrolyte vs metabolic etiology

## 2023-07-02 NOTE — H&P ADULT - NSHPREVIEWOFSYSTEMS_GEN_ALL_CORE
General:	denies night sweats, wt changes, cold/heat intolerance    Skin: denies rashes, pruritis, nail/hair changes  	  Ophthalmologic: denies vision changes, denies eye discharge or irritation  	  ENMT: denies nasal discharge, hearing changes, mouth sores, difficulty swallowing    Respiratory and Thorax: denies cough, denies difficulty breathing, denies shortness of breath  	  Cardiovascular: denies CP, endorses DEJESUS, denies palpitation    Gastrointestinal: denies n/v/d    Genitourinary: denies urgency, burning, nocturia    Musculoskeletal: denies muscle/joint pain    Neurological: denies dizziness, syncope, HA    Psychiatric: denies si/hi and hallucinations    Hematology/Lymphatics: denies easy bleeding/bruising    Endocrine: denies wt changes, hair/nail changes, denies cold/heat sensitivity

## 2023-07-02 NOTE — H&P ADULT - NSHPLABSRESULTS_GEN_ALL_CORE
LABS:                        13.5   5.42  )-----------( 106      ( 02 Jul 2023 10:09 )             38.7     07-02    136  |  101  |  16  ----------------------------<  306<H>  5.2<H>   |  22  |  1.1    Ca    9.1      02 Jul 2023 10:09  Mg     1.9     07-01    TPro  6.2  /  Alb  3.9  /  TBili  0.5  /  DBili  x   /  AST  39  /  ALT  26  /  AlkPhos  88  07-02      Urinalysis Basic - ( 02 Jul 2023 15:38 )    Color: Yellow / Appearance: Clear / SG: >1.050 / pH: x  Gluc: x / Ketone: Trace  / Bili: Negative / Urobili: <2 mg/dL   Blood: x / Protein: 30 mg/dL / Nitrite: Negative   Leuk Esterase: Negative / RBC: 2 /HPF / WBC 0 /HPF   Sq Epi: x / Non Sq Epi: x / Bacteria: Negative        Troponin T, Serum: <0.01 ng/mL (07-02-23 @ 10:09)      CARDIAC MARKERS ( 02 Jul 2023 10:09 )  x     / <0.01 ng/mL / x     / x     / x          RADIOLOGY:    < from: CT Angio Chest PE Protocol w/ IV Cont (07.02.23 @ 13:57) >      Findings/  impression:    Poor evaluation for pulmonary embolism due to suboptimal contrast   opacification of the pulmonary artery and its branches. No definite   evidence of central large pulmonary embolism.    Additional findings are unchanged in this short interval follow-up CT   when compared to CTA chest 6/30/2023      --- End of Report ---      < end of copied text >        VITALS:   T(F): 101.5  HR: 95  BP: 119/65  RR: 18  SpO2: 95%

## 2023-07-02 NOTE — ED PROVIDER NOTE - PROGRESS NOTE DETAILS
AB: Consulted EP, they state they saw the patient yesterday and their plan is unchanged - loop recorder scheduled for Thursday by Dr. Joaquin. ED Attending KATERINE Torres  Patient and family aware of all results, treated for for fever, gentle IV fluids due to concern of CHF, antibiotics given, imaging reviewed, EP was made aware of patient, report no intervention at this time, patient was scheduled for procedure on Thursday patient being treated for fever at this time.  Medical admitting team aware of patient admission.

## 2023-07-02 NOTE — H&P ADULT - ATTENDING COMMENTS
83-year-old man with PMHx of HTN, DM, DLD, GERD, prostate cancer (sp seed implant in 2008), pAfib, Hx of  SVT follows with Dr. Motta presenting to the ED for evaluation of DEJESUS found to be covid positive and  Questionable PE.    Agree  with assessment  except for changes below.     CTA: Poor evaluation for pulmonary embolism due to suboptimal contrast   opacification of the pulmonary artery and its branches. No definite   evidence of central large pulmonary embolism.  Additional findings are unchanged in this short interval follow-up CT   when compared to CTA chest 6/30/2023    NST: 1. NORMAL LEXISCAN / REST MYOCARDIAL PERFUSION TOMOGRAPHY, WITH NO   EVIDENCE FOR ISCHEMIA DURING LEXISCAN INFUSION.  2. NORMAL RESTING LEFT VENTRICULAR WALL MOTION AND WALL THICKENING.  3. LEFT VENTRICULAR EJECTION FRACTION OF  58 % WHICH IS WITHIN RANGE OF   NORMAL    Vital Signs (24 Hrs):  T(C): 38.6 (07-02-23 @ 15:38), Max: 38.6 (07-02-23 @ 15:38)  HR: 95 (07-02-23 @ 15:38) (95 - 118)  BP: 119/65 (07-02-23 @ 15:38) (119/65 - 136/74)  RR: 18 (07-02-23 @ 15:38) (18 - 18)  SpO2: 95% (07-02-23 @ 15:38) (95% - 98%)  Daily Height in cm: 185.42 (02 Jul 2023 08:46)          IMPRESSION  Acute  respiratory Distress  Likely Secondary to COVID  with the possibility of  PE  After discussion with  Patient He  decline Anticoagulation  at this  point   Send  D-dimer   Consider VQ scan   Patient  Understands the risk and benefits  > Isolation precautions (contact, droplet, airborne)  >Start  Decadron 6mg IV  Daily   if hypoxemia   >Continue airborne  Isolation   >Please monitor  O2 sat q8  and vitals  >Please  follow up inflammatory markers (D-dimer, CRP, ferritin) Q48-72H if clinically worsening  >Active type and screen  >Incentive spirometry at bedside  >Titrate supplemental O2 to maintain SpO2 92-96%    Hx SVT s/p DCCV last admission, pAF  - EP cs for [facility] considering frequency of sxs  - titrate metoprolol to 25 tid  - ASA / statin     Hx  R basial nodular density   - keep OP pulm appointment    Hx DM type II   Hx  Dyslipidemia   - statin   Hold oral meds;  check fs;  Start insulin  regimen if  serum Glucose >180, start insulin  protocol and adjust insulin  Lantus/Lispro,  Hold for Hypoglycemia Goal  Gaol 140-180      Anemia - normocytic, trend CBC     # CKD IIIa  - stable, trend bmp    # H/o prostate cancer s/p RT/brachytherapy   - c/w home Flomax 83-year-old man with PMHx of HTN, DM, DLD, GERD, prostate cancer (sp seed implant in 2008), pAfib, Hx of  SVT follows with Dr. Motta presenting to the ED for evaluation of DEJESUS found to be covid positive and  Questionable PE.    Agree  with assessment  except for changes below.     CTA: Poor evaluation for pulmonary embolism due to suboptimal contrast   opacification of the pulmonary artery and its branches. No definite   evidence of central large pulmonary embolism.  Additional findings are unchanged in this short interval follow-up CT   when compared to CTA chest 6/30/2023    NST: 1. NORMAL LEXISCAN / REST MYOCARDIAL PERFUSION TOMOGRAPHY, WITH NO   EVIDENCE FOR ISCHEMIA DURING LEXISCAN INFUSION.  2. NORMAL RESTING LEFT VENTRICULAR WALL MOTION AND WALL THICKENING.  3. LEFT VENTRICULAR EJECTION FRACTION OF  58 % WHICH IS WITHIN RANGE OF   NORMAL    Vital Signs (24 Hrs):  T(C): 38.6 (07-02-23 @ 15:38), Max: 38.6 (07-02-23 @ 15:38)  HR: 95 (07-02-23 @ 15:38) (95 - 118)  BP: 119/65 (07-02-23 @ 15:38) (119/65 - 136/74)  RR: 18 (07-02-23 @ 15:38) (18 - 18)  SpO2: 95% (07-02-23 @ 15:38) (95% - 98%)  Daily Height in cm: 185.42 (02 Jul 2023 08:46)      PHYSICAL EXAM  GENERAL: NAD,  HEAD:  NCAT, EOMI, MM  NECK: Supple, Nontender  NERVOUS SYSTEM:  AAOx3, NFD  CHEST/LUNG: +bs b/l, No wheezing   HEART: +s1s2 RRR  ABDOMEN: soft, NT/ND  EXTREMITIES:  pp, no edema  SKIN: age related skin changes         IMPRESSION  Acute  respiratory Distress  Likely Secondary to COVID  with the possibility of  PE  After discussion with  Patient He  decline Anticoagulation  at this  point   Send  D-dimer   Consider VQ scan   Patient  Understands the risk and benefits  > Isolation precautions (contact, droplet, airborne)  >Start  Decadron 6mg IV  Daily   if hsvtypoxemia   >Continue airborne  Isolation   >Please monitor  O2 sat q8  and vitals  >Please  follow up inflammatory markers (D-dimer, CRP, ferritin) Q48-72H if clinically worsening  >Active type and screen  >Incentive spirometry at bedside  >Titrate supplemental O2 to maintain SpO2 92-96%    Hx SVT s/p DCCV last admission, pAF  - EP cs for [facility] considering frequency of sxs  - titrate metoprolol to 25 tid  - ASA / statin     Hx  R basial nodular density   - keep OP pulm appointment    Hx DM type II   Hx  Dyslipidemia   - statin   Hold oral meds;  check fs;  Start insulin  regimen if  serum Glucose >180, start insulin  protocol and adjust insulin  Lantus/Lispro,  Hold for Hypoglycemia Goal  Gaol 140-180      Anemia - normocytic, trend CBC     Hx  CKD IIIa  - stable, trend bmp    Hx  prostate cancer s/p RT/brachytherapy   - c/w home Flomax    Seen on 07/02/23

## 2023-07-02 NOTE — H&P ADULT - ASSESSMENT
83-year-old man with PMHx of HTN, DM, DLD, GERD, prostate cancer (sp seed implant in 2008), pAfib, Hx of  SVT follows with Dr. Motta presenting to the ED for evaluation of DEJESUS found to be covid positive    # DEJESUS 2/2 covid vs ?PE  - CT PE nondiagnostic   - f/u inflammatory markers  - PT for ambulatory pulse ox  - f/u ID recs  - pt is adamant he wouldn't consent to a/c  - negative stress this week    # Hx SVT s/p DCCV last admission  # pAF  - EP cs for [facility] considering frequency of sxs  - titrate metoprolol to 25 tid  - ASA / statin     # hx R basial nodular density   - keep OP pulm appointment    # DM type II   # Dyslipidemia   - statin   - c/c diet F/S monitoring and ISS    # Anemia - normocytic     # CKD IIIa  - stable, trend bmp    # H/o prostate cancer s/p RT/brachytherapy   - c/w home Flomax     GERD- PPI  DVT lovenox  Diet dash/tlc/cc 83-year-old man with PMHx of HTN, DM, DLD, GERD, prostate cancer (sp seed implant in 2008), pAfib, Hx of  SVT follows with Dr. Motta presenting to the ED for evaluation of DEJESUS found to be covid positive.    # DEJESUS 2/2 covid vs ?PE  - CT PE nondiagnostic   - f/u inflammatory markers  - PT for ambulatory pulse ox  - f/u ID recs  - pt is adamant he wouldn't consent to a/c  - negative stress this week    # Hx SVT s/p DCCV last admission  # pAF  - EP cs for [facility] considering frequency of sxs  - titrate metoprolol to 25 tid  - ASA / statin     # hx R basial nodular density   - keep OP pulm appointment    # DM type II   # Dyslipidemia   - statin   - c/c diet F/S monitoring and ISS    # Anemia - normocytic     # CKD IIIa  - stable, trend bmp    # H/o prostate cancer s/p RT/brachytherapy   - c/w home Flomax     GERD- PPI  DVT lovenox  Diet dash/tlc/cc

## 2023-07-02 NOTE — ED ADULT NURSE NOTE - NSFALLUNIVINTERV_ED_ALL_ED
Bed/Stretcher in lowest position, wheels locked, appropriate side rails in place/Call bell, personal items and telephone in reach/Instruct patient to call for assistance before getting out of bed/chair/stretcher/Non-slip footwear applied when patient is off stretcher/[place] to call system/Physically safe environment - no spills, clutter or unnecessary equipment/Purposeful proactive rounding/Room/bathroom lighting operational, light cord in reach

## 2023-07-02 NOTE — H&P ADULT - HISTORY OF PRESENT ILLNESS
83-year-old man with PMHx of HTN, DM, DLD, GERD, prostate cancer (sp seed implant in 2008), pAfib, Hx of  SVT follows with Dr. Motta presenting to the ED for evaluation of acute dyspnea on exertion that began this morning.    Pt was a recent admit/dc 6/29 to 7/1 for similar complaint; on last admission treated for sxs SVT; pt refused a/c; had negative ischemic w/u; made appointment for ILP w/ EP.     Per pt he was in his usual state of health but this AM on ambulation noted DEJESUS; said it was similar in quality to sxs he presented for last admission. At rest pt says he is not SOB at all. Denies subjective fevers; n/v/d; denies cp, palpitations;     Of note pt is fully covid vaxed    ED   /74  RR 18 98% RA T 98.5 Tmax 101.5  Labs notable for a lactate of 3.9 on vBG  CT PE inconclusive for PE; otherwise unchanged from prior  Covid positive (not tested prior admission)    Pt was given cef/vanc and 500cc bolus and admitted to tele

## 2023-07-02 NOTE — ED PROVIDER NOTE - OBJECTIVE STATEMENT
83-year-old male, past medical history of A-fib on aspirin, hypertension, diabetes, HLD, presents emergency department with exertional shortness of breath onset 615 this morning.  Patient was just in the hospital for the same from 6/29 to 7/1 and is scheduled for a loop recorder by Dr. Joaquin on Thursday.  Patient was asymptomatic when he went home from the hospital yesterday.  Denies chest pain, fever, chills, cough, dizziness, abdominal pain. Had a normal stress test this last admission.

## 2023-07-03 ENCOUNTER — TRANSCRIPTION ENCOUNTER (OUTPATIENT)
Age: 83
End: 2023-07-03

## 2023-07-03 DIAGNOSIS — U07.1 COVID-19: ICD-10-CM

## 2023-07-03 LAB
ANION GAP SERPL CALC-SCNC: 11 MMOL/L — SIGNIFICANT CHANGE UP (ref 7–14)
BASOPHILS # BLD AUTO: 0.01 K/UL — SIGNIFICANT CHANGE UP (ref 0–0.2)
BASOPHILS NFR BLD AUTO: 0.3 % — SIGNIFICANT CHANGE UP (ref 0–1)
BUN SERPL-MCNC: 17 MG/DL — SIGNIFICANT CHANGE UP (ref 10–20)
CALCIUM SERPL-MCNC: 8.4 MG/DL — SIGNIFICANT CHANGE UP (ref 8.4–10.5)
CHLORIDE SERPL-SCNC: 103 MMOL/L — SIGNIFICANT CHANGE UP (ref 98–110)
CHOLEST SERPL-MCNC: 114 MG/DL — SIGNIFICANT CHANGE UP
CO2 SERPL-SCNC: 23 MMOL/L — SIGNIFICANT CHANGE UP (ref 17–32)
CREAT SERPL-MCNC: 0.9 MG/DL — SIGNIFICANT CHANGE UP (ref 0.7–1.5)
CULTURE RESULTS: NO GROWTH — SIGNIFICANT CHANGE UP
D DIMER BLD IA.RAPID-MCNC: 235 NG/ML DDU — HIGH
EGFR: 85 ML/MIN/1.73M2 — SIGNIFICANT CHANGE UP
EOSINOPHIL # BLD AUTO: 0.03 K/UL — SIGNIFICANT CHANGE UP (ref 0–0.7)
EOSINOPHIL NFR BLD AUTO: 0.8 % — SIGNIFICANT CHANGE UP (ref 0–8)
FERRITIN SERPL-MCNC: 227 NG/ML — SIGNIFICANT CHANGE UP (ref 30–400)
GLUCOSE BLDC GLUCOMTR-MCNC: 156 MG/DL — HIGH (ref 70–99)
GLUCOSE BLDC GLUCOMTR-MCNC: 216 MG/DL — HIGH (ref 70–99)
GLUCOSE BLDC GLUCOMTR-MCNC: 250 MG/DL — HIGH (ref 70–99)
GLUCOSE BLDC GLUCOMTR-MCNC: 252 MG/DL — HIGH (ref 70–99)
GLUCOSE SERPL-MCNC: 150 MG/DL — HIGH (ref 70–99)
HCT VFR BLD CALC: 33.4 % — LOW (ref 42–52)
HDLC SERPL-MCNC: 29 MG/DL — LOW
HGB BLD-MCNC: 11.5 G/DL — LOW (ref 14–18)
IMM GRANULOCYTES NFR BLD AUTO: 0.5 % — HIGH (ref 0.1–0.3)
IRON SATN MFR SERPL: 12 % — LOW (ref 15–50)
IRON SATN MFR SERPL: 29 UG/DL — LOW (ref 35–150)
LIPID PNL WITH DIRECT LDL SERPL: 72 MG/DL — SIGNIFICANT CHANGE UP
LYMPHOCYTES # BLD AUTO: 1.11 K/UL — LOW (ref 1.2–3.4)
LYMPHOCYTES # BLD AUTO: 29.4 % — SIGNIFICANT CHANGE UP (ref 20.5–51.1)
MCHC RBC-ENTMCNC: 30.6 PG — SIGNIFICANT CHANGE UP (ref 27–31)
MCHC RBC-ENTMCNC: 34.4 G/DL — SIGNIFICANT CHANGE UP (ref 32–37)
MCV RBC AUTO: 88.8 FL — SIGNIFICANT CHANGE UP (ref 80–94)
MONOCYTES # BLD AUTO: 0.62 K/UL — HIGH (ref 0.1–0.6)
MONOCYTES NFR BLD AUTO: 16.4 % — HIGH (ref 1.7–9.3)
NEUTROPHILS # BLD AUTO: 1.98 K/UL — SIGNIFICANT CHANGE UP (ref 1.4–6.5)
NEUTROPHILS NFR BLD AUTO: 52.6 % — SIGNIFICANT CHANGE UP (ref 42.2–75.2)
NON HDL CHOLESTEROL: 85 MG/DL — SIGNIFICANT CHANGE UP
NRBC # BLD: 0 /100 WBCS — SIGNIFICANT CHANGE UP (ref 0–0)
PLATELET # BLD AUTO: 100 K/UL — LOW (ref 130–400)
PMV BLD: 10.9 FL — HIGH (ref 7.4–10.4)
POTASSIUM SERPL-MCNC: 3.9 MMOL/L — SIGNIFICANT CHANGE UP (ref 3.5–5)
POTASSIUM SERPL-SCNC: 3.9 MMOL/L — SIGNIFICANT CHANGE UP (ref 3.5–5)
RBC # BLD: 3.76 M/UL — LOW (ref 4.7–6.1)
RBC # FLD: 13.2 % — SIGNIFICANT CHANGE UP (ref 11.5–14.5)
SODIUM SERPL-SCNC: 137 MMOL/L — SIGNIFICANT CHANGE UP (ref 135–146)
SPECIMEN SOURCE: SIGNIFICANT CHANGE UP
TIBC SERPL-MCNC: 250 UG/DL — SIGNIFICANT CHANGE UP (ref 220–430)
TRIGL SERPL-MCNC: 69 MG/DL — SIGNIFICANT CHANGE UP
UIBC SERPL-MCNC: 221 UG/DL — SIGNIFICANT CHANGE UP (ref 110–370)
WBC # BLD: 3.77 K/UL — LOW (ref 4.8–10.8)
WBC # FLD AUTO: 3.77 K/UL — LOW (ref 4.8–10.8)

## 2023-07-03 PROCEDURE — 99233 SBSQ HOSP IP/OBS HIGH 50: CPT

## 2023-07-03 PROCEDURE — 99223 1ST HOSP IP/OBS HIGH 75: CPT

## 2023-07-03 RX ORDER — SODIUM CHLORIDE 9 MG/ML
500 INJECTION, SOLUTION INTRAVENOUS ONCE
Refills: 0 | Status: COMPLETED | OUTPATIENT
Start: 2023-07-03 | End: 2023-07-03

## 2023-07-03 RX ADMIN — Medication 650 MILLIGRAM(S): at 02:12

## 2023-07-03 RX ADMIN — Medication 81 MILLIGRAM(S): at 11:57

## 2023-07-03 RX ADMIN — Medication 650 MILLIGRAM(S): at 13:35

## 2023-07-03 RX ADMIN — Medication 650 MILLIGRAM(S): at 15:35

## 2023-07-03 RX ADMIN — PANTOPRAZOLE SODIUM 40 MILLIGRAM(S): 20 TABLET, DELAYED RELEASE ORAL at 06:52

## 2023-07-03 RX ADMIN — Medication 4: at 11:40

## 2023-07-03 RX ADMIN — Medication 2: at 07:38

## 2023-07-03 RX ADMIN — ATORVASTATIN CALCIUM 40 MILLIGRAM(S): 80 TABLET, FILM COATED ORAL at 21:26

## 2023-07-03 RX ADMIN — Medication 25 MILLIGRAM(S): at 17:04

## 2023-07-03 RX ADMIN — Medication 2.5 MILLIGRAM(S): at 08:02

## 2023-07-03 RX ADMIN — TAMSULOSIN HYDROCHLORIDE 0.4 MILLIGRAM(S): 0.4 CAPSULE ORAL at 21:26

## 2023-07-03 RX ADMIN — Medication 25 MILLIGRAM(S): at 21:26

## 2023-07-03 RX ADMIN — Medication 4: at 16:51

## 2023-07-03 RX ADMIN — Medication 25 MILLIGRAM(S): at 08:02

## 2023-07-03 RX ADMIN — SODIUM CHLORIDE 500 MILLILITER(S): 9 INJECTION, SOLUTION INTRAVENOUS at 18:14

## 2023-07-03 NOTE — DISCHARGE NOTE PROVIDER - PROVIDER TOKENS
PROVIDER:[TOKEN:[[id number]],FOLLOWUP:[1 week]] PROVIDER:[TOKEN:[[id number]],FOLLOWUP:[1 week]],PROVIDER:[TOKEN:[[id number]]] PROVIDER:[TOKEN:[[id number]],FOLLOWUP:[1 week]],PROVIDER:[TOKEN:[[id number]]],PROVIDER:[TOKEN:[[id number]],SCHEDULEDAPPT:[07/21/2023],SCHEDULEDAPPTTIME:[02:00 PM]],PROVIDER:[TOKEN:[[id number]],FOLLOWUP:[2 weeks]]

## 2023-07-03 NOTE — PROGRESS NOTE ADULT - SUBJECTIVE AND OBJECTIVE BOX
CHIEF COMPLAINT:    Patient is a 83y old  Male who presents with a chief complaint of Shortness of breath     INTERVAL HPI/OVERNIGHT EVENTS:    Patient seen and examined at bedside. No acute overnight events occurred.    ROS: Reports cough. All other systems are negative.    Medications:  Standing  aspirin  chewable 81 milliGRAM(s) Oral daily  atorvastatin 40 milliGRAM(s) Oral at bedtime  dextrose 5%. 1000 milliLiter(s) IV Continuous <Continuous>  dextrose 50% Injectable 25 Gram(s) IV Push once  enalapril 2.5 milliGRAM(s) Oral daily  glucagon  Injectable 1 milliGRAM(s) IntraMuscular once  insulin lispro (ADMELOG) corrective regimen sliding scale   SubCutaneous three times a day before meals  metoprolol tartrate 25 milliGRAM(s) Oral every 8 hours  pantoprazole    Tablet 40 milliGRAM(s) Oral before breakfast  tamsulosin 0.4 milliGRAM(s) Oral at bedtime    PRN Meds  acetaminophen     Tablet .. 650 milliGRAM(s) Oral every 6 hours PRN  aluminum hydroxide/magnesium hydroxide/simethicone Suspension 30 milliLiter(s) Oral every 4 hours PRN  dextrose Oral Gel 15 Gram(s) Oral once PRN  melatonin 3 milliGRAM(s) Oral at bedtime PRN  ondansetron Injectable 4 milliGRAM(s) IV Push every 8 hours PRN        Vital Signs:    T(F): 100.3 (23 @ 15:30), Max: 101.2 (23 @ 13:03)  HR: 81 (23 @ 13:03) (67 - 81)  BP: 142/67 (23 @ 13:03) (106/56 - 142/67)  RR: 18 (23 @ 13:03) (18 - 18)  SpO2: 95% (23 @ 08:04) (95% - 97%)  I&O's Summary    2023 07:  -  2023 16:40  --------------------------------------------------------  IN: 460 mL / OUT: 0 mL / NET: 460 mL      Daily     Daily Weight in k (2023 05:01)  CAPILLARY BLOOD GLUCOSE      POCT Blood Glucose.: 216 mg/dL (2023 16:07)  POCT Blood Glucose.: 250 mg/dL (2023 11:29)  POCT Blood Glucose.: 156 mg/dL (2023 07:32)  POCT Blood Glucose.: 164 mg/dL (2023 21:40)      PHYSICAL EXAM:  GENERAL:  NAD  SKIN: No rashes or lesions  HEENT: Atraumatic. Normocephalic. Anicteric  NECK:  No JVD.   PULMONARY: Clear to ausculation bilaterally. No wheezing. No rales  CVS: Normal S1, S2. Regular rate and rhythm. No murmurs.  ABDOMEN/GI: Soft, Nontender, Nondistended; Bowel sounds are present  EXTREMITIES:  No edema B/L LE.  NEUROLOGIC:  No motor deficit.  PSYCH: Alert & oriented x 3, normal affect      LABS:                        11.5   3.77  )-----------( 100      ( 2023 06:37 )             33.4     07    137  |  103  |  17  ----------------------------<  150<H>  3.9   |  23  |  0.9    Ca    8.4      2023 06:37    TPro  6.2  /  Alb  3.9  /  TBili  0.5  /  DBili  x   /  AST  39  /  ALT  26  /  AlkPhos  88  07-        Trop <0.01, CKMB --, CK --, 23 @ 10:09        RADIOLOGY & ADDITIONAL TESTS:  Imaging or report Personally Reviewed:  [ ] YES  [ ] NO -->no new images    Telemetry reviewed independently - NSR, no acute events  EKG reviewed independently -->no new EKGs    Consultant(s) Notes Reviewed:  [ ] YES  [ ] NO  Care Discussed with Consultants/Other Providers [ ] YES  [ ] NO    Case discussed with resident  Care discussed with pt

## 2023-07-03 NOTE — PROGRESS NOTE ADULT - ASSESSMENT
82 yo M PMHx HTN, DM II, DLD, GERD, prostate cancer (sp seed implant in 2008), paroxysmal Afib, Hx of  SVT presented to the ED for evaluation of DEJESUS found to be covid positive. To note pt was discharged on 7/1 after being treated for SVT.    Dyspnea  -  Likely Secondary to COVID    - doubt PE  - Isolation precautions (contact, droplet, airborne)  - d/c steroids, pt not hypoxic  - repeat inflammatory markers  - Continue airborne  Isolation   - case dw ID, no active Rx as   - WBC downtrended, febrile--monitor for sepsis, will give IVF bolus  - repeat Lactic acid, downtrending    SVT s/p DCCV last admission, pAF  - [facility]OT on dc  - HR controlled  - metoprolol increased to three times daily, will increase BID dose for ease of adherence  - ASA / statin     R basial nodular density   - keep OP pulm appointment    DM type II   Hold oral meds;  check fs;  Start insulin  regimen if  serum Glucose >180, start insulin  protocol and adjust insulin  Lantus/Lispro,  Hold for Hypoglycemia Goal  Goal 140-180     Dyslipidemia   - c/w statin       Anemia - normocytic, trend CBC     CKD IIIa  - stable, trend bmp    prostate cancer s/p RT/brachytherapy   - c/w home Flomax    #Progress Note Handoff:  Pending (specify):  Monitor overnight given downtrending WBC and fevers  Family discussion: I spoke with pt and family at  bedside,   Disposition: Home___/SNF___/Other________/Unknown at this time________

## 2023-07-03 NOTE — DISCHARGE NOTE PROVIDER - CARE PROVIDERS DIRECT ADDRESSES
,DirectAddress_Unknown ,DirectAddress_Unknown,DirectAddress_Unknown ,DirectAddress_Unknown,DirectAddress_Unknown,DirectAddress_Unknown,[email or web address]

## 2023-07-03 NOTE — PHYSICAL THERAPY INITIAL EVALUATION ADULT - NSPTDISCHREC_GEN_A_CORE
Home. No further therapy indicated. All functional tasks completed independently with safe sequencing.

## 2023-07-03 NOTE — DISCHARGE NOTE PROVIDER - NSDCFUSCHEDAPPT_GEN_ALL_CORE_FT
Marium Joaquin  [facility]  [facility] [address] [facility]  Scheduled Appointment: 08/09/2023

## 2023-07-03 NOTE — DISCHARGE NOTE PROVIDER - NSDCPNSUBOBJ_GEN_ALL_CORE
Pt seen and examined at bedside. Feels fine. Case d/w ID, no pharmaceutical tx. Pt declining any adddiontal medications. Spoke with EP-will provided [facility]. Stable for dc. Pt with leukopenia and fever but is clinically well. Received fluids on admission for sepsis. Family agreeable to monitor at home, aware fevers are from COVID. Pt does not want any further hospitalization. Pt appears clinically well, not hypoxia, no pain, no SOB.

## 2023-07-03 NOTE — CONSULT NOTE ADULT - SUBJECTIVE AND OBJECTIVE BOX
Patient is a 83y old  Male who presents with a chief complaint of       HPI:  83-year-old man with PMHx of HTN, DM, DLD, GERD, prostate cancer (sp seed implant in ), pAfib, Hx of  SVT follows with Dr. Motta presenting to the ED for evaluation of acute dyspnea on exertion that began this morning.    Pt was a recent admit/dc  to  for similar complaint; on last admission treated for sxs SVT; pt refused a/c; had negative ischemic w/u; made appointment for ILP w/ EP.     Per pt he was in his usual state of health but this AM on ambulation noted DEJESUS; said it was similar in quality to sxs he presented for last admission. At rest pt says he is not SOB at all. Denies subjective fevers; n/v/d; denies cp, palpitations;     Of note pt is fully covid vaxed    ED   /74  RR 18 98% RA T 98.5 Tmax 101.5  Labs notable for a lactate of 3.9 on vBG  CT PE inconclusive for PE; otherwise unchanged from prior  Covid positive (not tested prior admission)    Pt was given cef/vanc and 500cc bolus and admitted to tele     (2023 17:57)      Electrophysiology:  PATIENT WAS SEEN 23  83y Male with h/o HTN, DM, DLD, GERD, prostate cancer (status post seed implant in ), c-spine fusion, paroxysmal atrial fibrillation not on anticoagulation (unknown reason) follows with Dr. Haider for cardiology, primary care physician Veto Prather MD  presented to the ED  for evaluation of acute onset shortness of breath that began this morning for which EMS was called. During transportation with EMT noted the patient to have tachycardia on ekg for which he was given 6 and 12 adenosine which did not convert his rhythm but he became hypotensive and was cardioverted with 100J and 100 ketamine and converted to sinus rhythm. Electrophysiology was consulted for further recommendations.   The patient was seen and evaluated. There is no echocardiogram and no recent ischemic work up. He states to have been diagnosed with atrial fibrillation back in  however was never initiated on anticoagulation and nothing was mentioned further about it on follow ups according to the patient. 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 There is no documentation of the event that took place by EMS so we are unable to determine what arrythmia the patient had at the time.   Patient underwent echocardiogram and stress test negative for ischemia  and was planned for ILR for long term cardiac monitoring for etiology of palpitations, however, were unable to schedule at that time. Patient was discharged with plan to return for ILR as out patient this week.   However, patient returned on  with similar complaints of dyspnea, was noted to have low grade temp, was checked for COVID-19 and found to be positive, not check last admission.  No AFib on tele, NSR with occasional ST 100s        REVIEW OF SYSTEMS    [x ] A ten-point review of systems was otherwise negative except as noted.  [ ] Due to altered mental status/intubation, subjective information were not able to be obtained from the patient. History was obtained, to the extent possible, from review of the chart and collateral sources of information.      PAST MEDICAL & SURGICAL HISTORY:  Diabetes mellitus      Hypertension      Paroxysmal atrial fibrillation      Prostate cancer      Status post cervical spinal fusion          Home Medications:  aspirin 81 mg oral tablet: 1 tab(s) orally once a day (2023 17:16)  Coenzyme Q10 200 mg oral capsule: 200 tab(s) orally once a day (2023 17:16)  D3 25 mcg (1000 intl units) oral tablet: 1 tab(s) orally once a day (2023 17:16)  enalapril 2.5 mg oral tablet: 1 tab(s) orally once a day (2023 17:16)  Flomax 0.4 mg oral capsule: 1 tab(s) orally 2 times a day (2023 17:16)  glimepiride 1 mg oral tablet: 1 tab(s) orally 2 times a day (2023 17:16)  metFORMIN 1000 mg oral tablet: 1 tab(s) orally 2 times a day (2023 17:16)  metoprolol tartrate 25 mg oral tablet: 1 tab(s) orally 2 times a day (2023 09:41)  Omega-3 Fish Oil 1000 mg oral capsule: 1 tab(s) orally 2 times a day (2023 17:16)  omeprazole 20 mg oral delayed release tablet: 1 tab(s) orally once a day (2023 17:16)  simvastatin 40 mg oral tablet: 1 tab(s) orally once a day (2023 17:16)      Allergies:  No Known Allergies      FAMILY HISTORY: not contributory       SOCIAL HISTORY:    CIGARETTES:  former pipe smoker quit 40 years ago  ALCOHOL: Former social  MARIJUANA:  ILLICIT DRUGS:        PREVIOUS DIAGNOSTIC TESTING:      ECHO  FINDINGS:  < from: TTE Echo Complete w/o Contrast w/ Doppler (23 @ 14:35) >  Summary:   1. LV Ejection Fraction by Nunes's Method with a biplane EF of 58 %.   2. Normal global left ventricular systolic function.   3. Normal left atrial size.   4. Normal right atrial size.    PHYSICIAN INTERPRETATION:  Left Ventricle: The left ventricular internal cavity size is normal.   Global LV systolic function was normal. Normal segmental left ventricular   systolic function. Normal LV filling pressures.  Right Ventricle: Normal right ventricular size and function.  Left Atrium: Normal left atrial size.  Right Atrium: Normal right atrial size.  Pericardium: There isno evidence of pericardial effusion.  Mitral Valve: The mitral valve is normal in structure. No evidence of   mitral valve stenosis. No evidence of mitral valve regurgitation is seen.  Tricuspid Valve: The tricuspid valve is normal in structure. Trivial   tricuspid regurgitation is visualized.  Aortic Valve: The aortic valve is trileaflet. No evidence of aortic   stenosis. Trivial aortic valve regurgitation is seen.  Pulmonic Valve: The pulmonic valve was not well visualized.  Aorta: The aortic root is normal in size and structure.  Venous: The inferior vena cava was normal sized, with respiratory size   variation less than 50%.    < end of copied text >    STRESS  FINDINGS:  < from: NM Nuclear Stress Pharmacologic Multiple (23 @ 14:16) >  Impression:  1. NORMAL LEXISCAN / REST MYOCARDIAL PERFUSION TOMOGRAPHY, WITH NO   EVIDENCE FOR ISCHEMIA DURING LEXISCAN INFUSION.  2. NORMAL RESTING LEFT VENTRICULAR WALL MOTION AND WALL THICKENING.  3. LEFT VENTRICULAR EJECTION FRACTION OF  58 % WHICH IS WITHIN RANGE OF   NORMAL.    < end of copied text >    CATHETERIZATION  FINDINGS:    ELECTROPHYSIOLOGY STUDY  FINDINGS:    CAROTID ULTRASOUND:  FINDINGS    VENOUS DUPLEX SCAN:  FINDINGS:    CHEST CT PULMONARY ANGIO with IV Contrast:  FINDINGS:  < from: CT Angio Chest PE Protocol w/ IV Cont (23 @ 13:57) >  Findings/  impression:    Poor evaluation for pulmonary embolism due to suboptimal contrast   opacification of the pulmonary artery and its branches. No definite   evidence of central large pulmonary embolism.    Additional findings are unchanged in this short interval follow-up CT   when compared to CTA chest 2023    < end of copied text >    < from: CT Angio Chest PE Protocol w/ IV Cont (23 @ 19:51) >  FINDINGS:    TUBES/LINES: None.    PULMONARY ARTERY CIRCULATION: No evidence of central or segmental   pulmonary embolism.    GREAT VESSELS/CARDIAC STRUCTURES/PERICARDIUM: The heart size is normal.   No pericardial effusion.Normal caliber aorta and pulmonary artery. Mild   coronary artery and aortic calcifications are noted. There is no evidence   of aortic aneurysm or dissection. The brachiocephalic vessels are   unremarkable.    LUNG PARENCHYMA/CENTRAL AIRWAYS/PLEURA:The central tracheobronchial tree   is patent. There are mild atelectatic changes at the lung bases.   Calcified granulomas in the left apex and at both lung bases. No evidence   of pleural effusion or pneumothorax    MEDIASTINUM/HILUM: There are no suspicious mediastinal, hilar, or   axillary lymph nodes.  The visualized portion of the thyroid gland is   unremarkable.    CHEST WALL/AXILLA: Unremarkable.    UPPER ABDOMEN: The partially visualized upper abdomen shows no acute   pathology.    OSSEOUS STRUCTURES: Degenerative changes of the spine are noted.   Postsurgical changes of the C-spine      IMPRESSION:    No evidence of pulmonary embolism.    Calcified granulomas in the left apex and at both lung bases.    < end of copied text >      MEDICATIONS  (STANDING):  aspirin  chewable 81 milliGRAM(s) Oral daily  atorvastatin 40 milliGRAM(s) Oral at bedtime  dextrose 5%. 1000 milliLiter(s) (50 mL/Hr) IV Continuous <Continuous>  dextrose 50% Injectable 25 Gram(s) IV Push once  enalapril 2.5 milliGRAM(s) Oral daily  glucagon  Injectable 1 milliGRAM(s) IntraMuscular once  insulin lispro (ADMELOG) corrective regimen sliding scale   SubCutaneous three times a day before meals  metoprolol tartrate 25 milliGRAM(s) Oral every 8 hours  pantoprazole    Tablet 40 milliGRAM(s) Oral before breakfast  tamsulosin 0.4 milliGRAM(s) Oral at bedtime    MEDICATIONS  (PRN):  acetaminophen     Tablet .. 650 milliGRAM(s) Oral every 6 hours PRN Temp greater or equal to 38C (100.4F), Mild Pain (1 - 3)  aluminum hydroxide/magnesium hydroxide/simethicone Suspension 30 milliLiter(s) Oral every 4 hours PRN Dyspepsia  dextrose Oral Gel 15 Gram(s) Oral once PRN Blood Glucose LESS THAN 70 milliGRAM(s)/deciliter  melatonin 3 milliGRAM(s) Oral at bedtime PRN Insomnia  ondansetron Injectable 4 milliGRAM(s) IV Push every 8 hours PRN Nausea and/or Vomiting      Vital Signs Last 24 Hrs  T(C): 36.9 (2023 05:01), Max: 38.6 (2023 15:38)  T(F): 98.5 (2023 05:01), Max: 101.5 (2023 15:38)  HR: 68 (2023 08:04) (67 - 95)  BP: 122/60 (2023 08:04) (106/56 - 141/64)  BP(mean): 85 (2023 00:26) (85 - 85)  RR: 18 (2023 08:04) (18 - 18)  SpO2: 95% (2023 08:04) (95% - 97%)    Parameters below as of 2023 08:04  Patient On (Oxygen Delivery Method): room air        PHYSICAL EXAM:    GENERAL: In no apparent distress, well nourished, and hydrated.  HEAD:  Atraumatic, Normocephalic  EYES: EOMI, PERRLA, conjunctiva and sclera clear  NECK: Supple and normal thyroid.  No JVD or carotid bruit.  Carotid pulse is 2+ bilaterally.  HEART: Regular rate and rhythm; No murmurs, rubs, or gallops.  PULMONARY: mild bibasilar rales,   No wheezing, or rhonchi bilaterally.  ABDOMEN: Soft, Nontender, Nondistended; Bowel sounds present  EXTREMITIES:  2+ Peripheral Pulses, No clubbing, cyanosis, or edema  NEUROLOGICAL: Grossly nonfocal    I&O's Detail    Daily     Daily Weight in k (2023 05:01)    INTERPRETATION OF TELEMETRY:    EC Lead ECG:   Ventricular Rate 110 BPM    Atrial Rate 110 BPM    P-R Interval 204 ms    QRS Duration 124 ms    Q-T Interval 338 ms    QTC Calculation(Bazett) 457 ms    P Axis 70 degrees    R Axis -24 degrees    T Axis 10 degrees    Diagnosis Line Sinus tachycardia  Right bundle branch block  Minimal voltage criteria for LVH, may be normal variant ( R in aVL )  Septal infarct , age undetermined  Abnormal ECG    Confirmed by Dale Monae ([id number]) on 7/3/2023 8:40:20 AM (23 @ 08:47)        LABS:                        11.5   3.77  )-----------( 100      ( 2023 06:37 )             33.4         137  |  103  |  17  ----------------------------<  150<H>  3.9   |  23  |  0.9    Ca    8.4      2023 06:37    TPro  6.2  /  Alb  3.9  /  TBili  0.5  /  DBili  x   /  AST  39  /  ALT  26  /  AlkPhos  88  07    CARDIAC MARKERS ( 2023 10:09 )  x     / <0.01 ng/mL / x     / x     / x            Urinalysis Basic - ( 2023 06:37 )    Color: x / Appearance: x / SG: x / pH: x  Gluc: 150 mg/dL / Ketone: x  / Bili: x / Urobili: x   Blood: x / Protein: x / Nitrite: x   Leuk Esterase: x / RBC: x / WBC x   Sq Epi: x / Non Sq Epi: x / Bacteria: x      BNP  Thyroid Stimulating Hormone, Serum: 1.08 uIU/mL [0.27 - 4.20] (23 @ 06:26)  Free Thyroxine, Serum: 1.3 ng/dL [0.9 - 1.8] (23 @ 06:26)    SARS-CoV-2 Result: Detected: (23 @ 15:59)         RADIOLOGY & ADDITIONAL STUDIES:

## 2023-07-03 NOTE — DISCHARGE NOTE PROVIDER - CARE PROVIDER_API CALL
Veto Williamson  Internal Medicine  [address] [place] [address]  Phone: [phone]  Fax: [phone]  Follow Up Time: 1 week   Veto Williamson  Internal Medicine  [address] [place] [address]  Phone: [phone]  Fax: [phone]  Follow Up Time: 1 week    Marium Joaquin  Cardiovascular Disease  [address] [place] [address]  Phone: [phone]  Fax: [phone]  Follow Up Time:    Veto Williamson  Internal Medicine  [address] [place] [address]  Phone: [phone]  Fax: [phone]  Follow Up Time: 1 week    Marium Joaquin  Cardiovascular Disease  [address] [place] [address]  Phone: [phone]  Fax: [phone]  Follow Up Time:     JAMILA BERNARD, HUNTER HARRISON  Phone: [phone]  Fax: [phone]  Scheduled Appointment: 07/21/2023 02:00 PM    Sarika Mcintosh)  Hematology; Internal Medicine; Medical Oncology  [address] [place] [id number]  Phone: [phone]  Fax: [phone]  Follow Up Time: 2 weeks

## 2023-07-03 NOTE — CONSULT NOTE ADULT - ATTENDING COMMENTS
I have personally seen and examined this patient.  I have fully participated in the care of this patient.  I have reviewed all pertinent clinical information, including history, physical exam, plan and note.  I have reviewed all pertinent clinical information and reviewed all relevant imaging and diagnostic studies personally.  My addendum includes the final recommendations and supersedes the resident's note.       #Sepsis on admission T>101 P>90 due to COVID19, non-severe, not requiring supplemental O2   #SVT, recent admission    - Long discussion with patient, feels asymptomatic. Had fever in ER. Offered RDV/Paxlovid- pt declined . Continue to monitor  - Monitor off antibiotics   - f/u BCX (Recent admission, rule out phlebitis)  - Please recall ID PRN. Please inform ID of any patient clinical change or any new pertinent laboratory or radiographic data     If any questions, please call or send a message on [employer] Teams  Please continue to update ID with any pertinent new laboratory or radiographic findings  Spectra [id number]

## 2023-07-03 NOTE — DISCHARGE NOTE PROVIDER - NSDCCPCAREPLAN_GEN_ALL_CORE_FT
PRINCIPAL DISCHARGE DIAGNOSIS  Diagnosis: Fever  Assessment and Plan of Treatment: Shortness of breath  # You were admitted for SOB and chest pain and foud to be COVID positive. We did the work up for chest pain it was negative. EP saw you and recommended [facility] prior your discahrge and they will schedule you for ILP which records the activity of the heart for long term. Please f/u with EP regualrly.   Shortness of breath (dyspnea) means you have trouble breathing and could indicate a medical problem. Causes include lung disease, heart disease, low amount of red blood cells (anemia), poor physical fitness, being overweight, smoking, etc. Your health care provider today may not be able to find a cause for your shortness of breath after your exam. In this case, it is important to have a follow-up exam with your primary care physician as instructed. If medicines were prescribed, take them as directed for the full length of time directed. Refrain from tobacco products.  SEEK IMMEDIATE MEDICAL CARE IF YOU HAVE ANY OF THE FOLLOWING SYMPTOMS: worsening shortness of breath, chest pain, back pain, abdominal pain, fever, coughing up blood, lightheadedness/dizziness.        SECONDARY DISCHARGE DIAGNOSES  Diagnosis: Shortness of breath  Assessment and Plan of Treatment:      PRINCIPAL DISCHARGE DIAGNOSIS  Diagnosis: Fever  Assessment and Plan of Treatment: Shortness of breath  # You were admitted for SOB and chest pain and foud to be COVID positive. We did the work up for chest pain it was negative. EP saw you and recommended [facility] prior your discahrge and they will schedule you for ILP which records the activity of the heart for long term. Please f/u with EP regualrly.   Shortness of breath (dyspnea) means you have trouble breathing and could indicate a medical problem. Causes include lung disease, heart disease, low amount of red blood cells (anemia), poor physical fitness, being overweight, smoking, etc. Your health care provider today may not be able to find a cause for your shortness of breath after your exam. In this case, it is important to have a follow-up exam with your primary care physician as instructed. If medicines were prescribed, take them as directed for the full length of time directed. Refrain from tobacco products.  SEEK IMMEDIATE MEDICAL CARE IF YOU HAVE ANY OF THE FOLLOWING SYMPTOMS: worsening shortness of breath, chest pain, back pain, abdominal pain, fever, coughing up blood, lightheadedness/dizziness.  Please follow up with your PMD in one week.   Please follow up with the hematologist Dr. Mcintosh in two weeks.   Please follow up with Dr. Coughlin for your scheduled appointment on 7/21 at 2pm.   Please follow up with the electrophysiologist Dr. Joaquin on 8/9/23.         SECONDARY DISCHARGE DIAGNOSES  Diagnosis: Shortness of breath  Assessment and Plan of Treatment:     Diagnosis: Thrombocytopenia  Assessment and Plan of Treatment:

## 2023-07-03 NOTE — CONSULT NOTE ADULT - ASSESSMENT
ASSESSMENT  83y M admitted with Shortness of breath      Diabetes mellitus    Hypertension    Paroxysmal atrial fibrillation    Prostate cancer    DL    Hx of SVT    PROBLEMS  Pt with multiple comorbidities admitted for 1 day history of dyspnea on exertion, no new rhinorrhea or cough, no viral symptoms, tested COVID +    RECOMMENDATIONS  - No further actions to be done at the moment in agreement with the patient  - f/u labs and vitals  - Monitor Sx

## 2023-07-03 NOTE — CONSULT NOTE ADULT - SUBJECTIVE AND OBJECTIVE BOX
DIANE GUERRERO  83y, Male  Allergy: No Known Allergies      CHIEF COMPLAINT:     HPI:  83-year-old man with PMHx of HTN, DM, DLD, GERD, prostate cancer (sp RT and seed implant in 2008), pAfib, Hx of  SVT follows with Dr. Motta presenting to the ED for evaluation of acute dyspnea on exertion that began this morning.    Pt was a recent admit/dc 6/29 to 7/1 for similar complaint; on last admission treated for sxs SVT; pt refused a/c; had negative ischemic w/u; made appointment for ILP w/ EP.     Per pt he was in his usual state of health but this AM on ambulation noted DEJESUS; said it was similar in quality to sxs he presented for last admission. At rest pt says he is not SOB at all. Denies subjective fevers; n/v/d; denies cp, palpitations;     Of note pt is fully covid vaxed    ED   /74  RR 18 98% RA T 98.5 Tmax 101.5  Labs notable for a lactate of 3.9 on vBG  CT PE inconclusive for PE; otherwise unchanged from prior  Covid positive (not tested prior admission)    Pt was given cef/vanc and 500cc bolus and admitted to [facility]     (02 Jul 2023 17:57)    FAMILY HISTORY:    PAST MEDICAL & SURGICAL HISTORY:  Diabetes mellitus      Hypertension      Paroxysmal atrial fibrillation      Prostate cancer      Status post cervical spinal fusion          SOCIAL HISTORY    Substance Use (  ) never used  (  ) IVDU (  ) Other:  Tobacco Usage:  (   ) never smoked   (X) former smoker   (   ) current smoker   Alcohol Usage: (   ) social  (   ) daily use (   ) denies  Sexual History:       ROS  General: Denies rigors, nightsweats  HEENT: mentions chronic rhinorrhea related to seasonal allergy, Denies headache, sore throat, eye pain  CV: Denies CP, palpitations  PULM: mentions chronic dry cough with mild increase in frequency lately, Denies wheezing, hemoptysis  GI: Denies hematemesis, hematochezia, melena  : Denies discharge, hematuria  MSK: Denies arthralgias, myalgias  SKIN: Denies rash, lesions  NEURO: Denies paresthesias, weakness  PSYCH: Denies depression, anxiety    VITALS:  T(F): 98.5, Max: 101.5 (07-02-23 @ 15:38)  HR: 68  BP: 122/60  RR: 18Vital Signs Last 24 Hrs  T(C): 36.9 (03 Jul 2023 05:01), Max: 38.6 (02 Jul 2023 15:38)  T(F): 98.5 (03 Jul 2023 05:01), Max: 101.5 (02 Jul 2023 15:38)  HR: 68 (03 Jul 2023 08:04) (67 - 95)  BP: 122/60 (03 Jul 2023 08:04) (106/56 - 141/64)  BP(mean): 85 (03 Jul 2023 00:26) (85 - 85)  RR: 18 (03 Jul 2023 08:04) (18 - 18)  SpO2: 95% (03 Jul 2023 08:04) (95% - 97%)    Parameters below as of 03 Jul 2023 08:04  Patient On (Oxygen Delivery Method): room air        PHYSICAL EXAM:  Gen: NAD, resting in bed  HEENT: Normocephalic, atraumatic  Neck: supple, no lymphadenopathy  CV: Regular rate & regular rhythm  Lungs: decreased BS at bases  Abdomen: Soft, BS present  Ext: Warm, well perfused  Neuro: non focal, awake  Skin: no rash, no erythema    TESTS & MEASUREMENTS:                        11.5   3.77  )-----------( 100      ( 03 Jul 2023 06:37 )             33.4     07-03    137  |  103  |  17  ----------------------------<  150<H>  3.9   |  23  |  0.9    Ca    8.4      03 Jul 2023 06:37    TPro  6.2  /  Alb  3.9  /  TBili  0.5  /  DBili  x   /  AST  39  /  ALT  26  /  AlkPhos  88  07-02      LIVER FUNCTIONS - ( 02 Jul 2023 10:09 )  Alb: 3.9 g/dL / Pro: 6.2 g/dL / ALK PHOS: 88 U/L / ALT: 26 U/L / AST: 39 U/L / GGT: x           Urinalysis Basic - ( 03 Jul 2023 06:37 )    Color: x / Appearance: x / SG: x / pH: x  Gluc: 150 mg/dL / Ketone: x  / Bili: x / Urobili: x   Blood: x / Protein: x / Nitrite: x   Leuk Esterase: x / RBC: x / WBC x   Sq Epi: x / Non Sq Epi: x / Bacteria: x          Lactate, Blood: 2.1 mmol/L (07-02-23 @ 20:46)  Blood Gas Venous - Lactate: 3.90 mmol/L (07-02-23 @ 10:45)      INFECTIOUS DISEASES TESTING      RADIOLOGY & ADDITIONAL TESTS:  I have personally reviewed the last Chest xray  CXR  Xray Chest 1 View- PORTABLE-Urgent:   ACC: [id number] EXAM:  XR CHEST PORTABLE URGENT 1V   ORDERED BY: MARY BEAR     PROCEDURE DATE:  07/02/2023          INTERPRETATION:  Clinical History / Reason for exam: Shortness of breath.    Comparison : Chest radiograph 6/29/2023.    Technique/Positioning: Single frontal chest x-ray obtained.    Findings:    Support devices: None.    Cardiac/mediastinum/hilum: Unchanged.    Lung parenchyma/Pleura: Within normal limits.    Skeleton/soft tissues: Unchanged.    Impression:    No radiographic evidence of acute cardiopulmonary disease.        --- End of Report ---            ROCIO COCHRAN MD; Attending Radiologist  This document has been electronically signed. Jul 2 2023  4:33PM (07-02-23 @ 10:19)      CT  CT Angio Chest PE Protocol w/ IV Cont:   ACC: [id number] EXAM:  CT ANGIO CHEST PULM ART WAWIC   ORDERED BY: WALLACE NOE     PROCEDURE DATE:  07/02/2023          INTERPRETATION:  CLINICAL INDICATION: PE eval    TECHNIQUE:  CTA of the thorax was performed after administration of intravenous   contrast. Sagittal and coronal reformats were performed as well as MIP   reconstructions.    COMPARISON CT: 6/30/2023    Findings/  impression:    Poor evaluation for pulmonary embolism due to suboptimal contrast   opacification of the pulmonary artery and its branches. No definite   evidence of central large pulmonary embolism.    Additional findings are unchanged in this short interval follow-up CT   when compared to CTA chest 6/30/2023      --- End of Report ---            NANETTE COUCH MD;Attending Radiologist  This document has been electronically signed. Jul 2 2023  3:31PM (07-02-23 @ 13:57)  CT Angio Chest PE Protocol w/ IV Cont:   ACC: [id number] EXAM:  CT ANGIO CHEST PULM ART WAWIC   ORDERED BY: CHITO RIVERA     PROCEDURE DATE:  06/30/2023          INTERPRETATION:  CLINICAL HISTORY / REASON FOR EXAM: SOB, DEJESUS in setting   of SVT. Nodules on CXR.  Pulmonary embolus evaluation. WBC 6.90   PMHx of   HTN, DM, DLD, GERD, prostate cancer (s/p seed implant in 2008), pAfib, Hx   of  SVT    TECHNIQUE:   Contiguous axial CT images were obtained from the thoracic   inlet to the upper abdomen with intravenous contrast.  Thin sections were   reconstructed through the pulmonary vasculature. Imaging was timed for   evaluation of pulmonary embolism.  Reformatted images in the coronal and   sagittal planes were acquired, as well as 3D, Volume rendering, and MIP   reconstructed images.    Comparison: None      FINDINGS:    TUBES/LINES: None.    PULMONARY ARTERY CIRCULATION: No evidence of central or segmental   pulmonary embolism.    GREAT VESSELS/CARDIAC STRUCTURES/PERICARDIUM: The heart size is normal.   No pericardial effusion.Normal caliber aorta and pulmonary artery. Mild   coronary artery and aortic calcifications are noted. There is no evidence   of aortic aneurysm or dissection. The brachiocephalic vessels are   unremarkable.    LUNG PARENCHYMA/CENTRAL AIRWAYS/PLEURA:The central tracheobronchial tree   is patent. There are mild atelectatic changes at the lung bases.   Calcified granulomas in the left apex and at both lung bases. No evidence   of pleural effusion or pneumothorax    MEDIASTINUM/HILUM: There are no suspicious mediastinal, hilar, or   axillary lymph nodes.  The visualized portion of the thyroid gland is   unremarkable.    CHEST WALL/AXILLA: Unremarkable.    UPPER ABDOMEN: The partially visualized upper abdomen shows no acute   pathology.    OSSEOUS STRUCTURES: Degenerative changes of the spine are noted.   Postsurgical changes of the C-spine      IMPRESSION:    No evidence of pulmonary embolism.    Calcified granulomas in the left apex and at both lung bases.    --- End of Report ---            JASSI COLORADO MD; Attending Interventional Radiologist  This document has been electronically signed. Jun 30 2023  8:29PM (06-30-23 @ 19:51)      CARDIOLOGY TESTING  12 Lead ECG:   Ventricular Rate 110 BPM    Atrial Rate 110 BPM    P-R Interval 204 ms    QRS Duration 124 ms    Q-T Interval 338 ms    QTC Calculation(Bazett) 457 ms    P Axis 70 degrees    R Axis -24 degrees    T Axis 10 degrees    Diagnosis Line Sinus tachycardia  Right bundle branch block  Minimal voltage criteria for LVH, may be normal variant ( R in aVL )  Septal infarct , age undetermined  Abnormal ECG    Confirmed by Dale Monae ([id number]) on 7/3/2023 8:40:20 AM (07-02-23 @ 08:47)  12 Lead ECG:   Ventricular Rate 68 BPM    Atrial Rate 68 BPM    P-R Interval 294 ms    QRS Duration 128 ms    Q-T Interval 414 ms    QTC Calculation(Bazett) 440 ms    P Axis 40 degrees    R Axis -15 degrees    T Axis 8 degrees    Diagnosis Line Sinus rhythm with 1st degree A-V block  Right bundle branch block  Minimal voltage criteria for LVH, may be normal variant  Abnormal ECG    Confirmed by Dale Monae ([id number]) on 6/29/2023 7:03:53 PM (06-29-23 @ 16:30)      MEDICATIONS  aspirin  chewable 81  atorvastatin 40  dextrose 5%. 1000  dextrose 50% Injectable 25  enalapril 2.5  glucagon  Injectable 1  insulin lispro (ADMELOG) corrective regimen sliding scale   metoprolol tartrate 25  pantoprazole    Tablet 40  tamsulosin 0.4      ANTIBIOTICS:  Took Cefepime 2g IV once and Vancomycin 1000mg IV once

## 2023-07-04 LAB
ANION GAP SERPL CALC-SCNC: 13 MMOL/L — SIGNIFICANT CHANGE UP (ref 7–14)
BUN SERPL-MCNC: 13 MG/DL — SIGNIFICANT CHANGE UP (ref 10–20)
CALCIUM SERPL-MCNC: 8 MG/DL — LOW (ref 8.4–10.4)
CHLORIDE SERPL-SCNC: 102 MMOL/L — SIGNIFICANT CHANGE UP (ref 98–110)
CO2 SERPL-SCNC: 20 MMOL/L — SIGNIFICANT CHANGE UP (ref 17–32)
CREAT SERPL-MCNC: 0.9 MG/DL — SIGNIFICANT CHANGE UP (ref 0.7–1.5)
CRP SERPL-MCNC: 34.2 MG/L — HIGH
D DIMER BLD IA.RAPID-MCNC: 245 NG/ML DDU — HIGH
EGFR: 85 ML/MIN/1.73M2 — SIGNIFICANT CHANGE UP
GLUCOSE BLDC GLUCOMTR-MCNC: 185 MG/DL — HIGH (ref 70–99)
GLUCOSE BLDC GLUCOMTR-MCNC: 199 MG/DL — HIGH (ref 70–99)
GLUCOSE BLDC GLUCOMTR-MCNC: 228 MG/DL — HIGH (ref 70–99)
GLUCOSE BLDC GLUCOMTR-MCNC: 230 MG/DL — HIGH (ref 70–99)
GLUCOSE SERPL-MCNC: 183 MG/DL — HIGH (ref 70–99)
HCT VFR BLD CALC: 32.7 % — LOW (ref 42–52)
HGB BLD-MCNC: 11.6 G/DL — LOW (ref 14–18)
LACTATE SERPL-SCNC: 1.7 MMOL/L — SIGNIFICANT CHANGE UP (ref 0.7–2)
MAGNESIUM SERPL-MCNC: 1.6 MG/DL — LOW (ref 1.8–2.4)
MCHC RBC-ENTMCNC: 31.4 PG — HIGH (ref 27–31)
MCHC RBC-ENTMCNC: 35.5 G/DL — SIGNIFICANT CHANGE UP (ref 32–37)
MCV RBC AUTO: 88.6 FL — SIGNIFICANT CHANGE UP (ref 80–94)
NRBC # BLD: 0 /100 WBCS — SIGNIFICANT CHANGE UP (ref 0–0)
PLATELET # BLD AUTO: 86 K/UL — LOW (ref 130–400)
PMV BLD: 11.2 FL — HIGH (ref 7.4–10.4)
POTASSIUM SERPL-MCNC: 3.8 MMOL/L — SIGNIFICANT CHANGE UP (ref 3.5–5)
POTASSIUM SERPL-SCNC: 3.8 MMOL/L — SIGNIFICANT CHANGE UP (ref 3.5–5)
PROCALCITONIN SERPL-MCNC: 0.07 NG/ML — SIGNIFICANT CHANGE UP (ref 0.02–0.1)
RBC # BLD: 3.69 M/UL — LOW (ref 4.7–6.1)
RBC # FLD: 13.2 % — SIGNIFICANT CHANGE UP (ref 11.5–14.5)
SODIUM SERPL-SCNC: 135 MMOL/L — SIGNIFICANT CHANGE UP (ref 135–146)
WBC # BLD: 3.82 K/UL — LOW (ref 4.8–10.8)
WBC # FLD AUTO: 3.82 K/UL — LOW (ref 4.8–10.8)

## 2023-07-04 PROCEDURE — 99233 SBSQ HOSP IP/OBS HIGH 50: CPT

## 2023-07-04 RX ORDER — MAGNESIUM SULFATE 500 MG/ML
2 VIAL (ML) INJECTION ONCE
Refills: 0 | Status: COMPLETED | OUTPATIENT
Start: 2023-07-04 | End: 2023-07-04

## 2023-07-04 RX ADMIN — Medication 25 MILLIGRAM(S): at 05:46

## 2023-07-04 RX ADMIN — Medication 2: at 08:42

## 2023-07-04 RX ADMIN — Medication 25 GRAM(S): at 12:58

## 2023-07-04 RX ADMIN — Medication 650 MILLIGRAM(S): at 13:34

## 2023-07-04 RX ADMIN — Medication 2.5 MILLIGRAM(S): at 05:46

## 2023-07-04 RX ADMIN — PANTOPRAZOLE SODIUM 40 MILLIGRAM(S): 20 TABLET, DELAYED RELEASE ORAL at 06:29

## 2023-07-04 RX ADMIN — Medication 25 MILLIGRAM(S): at 21:06

## 2023-07-04 RX ADMIN — Medication 25 MILLIGRAM(S): at 13:35

## 2023-07-04 RX ADMIN — TAMSULOSIN HYDROCHLORIDE 0.4 MILLIGRAM(S): 0.4 CAPSULE ORAL at 21:06

## 2023-07-04 RX ADMIN — ATORVASTATIN CALCIUM 40 MILLIGRAM(S): 80 TABLET, FILM COATED ORAL at 21:06

## 2023-07-04 RX ADMIN — Medication 81 MILLIGRAM(S): at 12:57

## 2023-07-04 RX ADMIN — Medication 4: at 11:32

## 2023-07-04 RX ADMIN — Medication 2: at 16:40

## 2023-07-04 NOTE — PROGRESS NOTE ADULT - ASSESSMENT
82 yo M PMHx HTN, DM II, DLD, GERD, prostate cancer (sp seed implant in 2008), paroxysmal Afib, Hx of  SVT presented to the ED for evaluation of DEJESUS found to be covid positive. To note pt was discharged on 7/1 after being treated for SVT.    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 Suspect this is due to sepsis but will request hematology eval to ensure no othe rpathology  - doubt HIT as 4T score 1  - trend plts    SVT s/p DCCV last admission, pAF  - MCOT on dc--EP to leave at bedside  - HR controlled  - metoprolol increased to three times daily, will increase BID dose for ease of adherence  - ASA / statin     R basial nodular density   - keep OP pulm appointment    DM type II   Hold oral meds;  check fs;  Start insulin  regimen if  serum Glucose >180, start insulin  protocol and adjust insulin  Lantus/Lispro,  Hold for Hypoglycemia Goal  Goal 140-180     Dyslipidemia   - c/w statin       Anemia - normocytic, trend CBC     CKD IIIa  - stable, trend bmp    prostate cancer s/p RT/brachytherapy   - c/w home Flomax    #Progress Note Handoff:  Pending (specify):  Monitor overnight given downtrending WBC and fevers  Family discussion: I spoke with pt and family at  bedside,   Disposition: Home___/SNF___/Other________/Unknown at this time________

## 2023-07-04 NOTE — PROGRESS NOTE ADULT - SUBJECTIVE AND OBJECTIVE BOX
24H events:    Patient is a 83y old Male who presents with a chief complaint of Shortness of breath (03 Jul 2023 11:26)    Primary diagnosis of Fever       Today is hospital day 2d. This morning patient was seen and examined at bedside, resting comfortably in bed.  No active complaints.     PAST MEDICAL & SURGICAL HISTORY  Diabetes mellitus    Hypertension    Paroxysmal atrial fibrillation    Prostate cancer    Status post cervical spinal fusion      SOCIAL HISTORY:  Social History:  Lives at home with family, no needs at baseline (02 Jul 2023 17:57)      ALLERGIES:  No Known Allergies    MEDICATIONS:  STANDING MEDICATIONS  aspirin  chewable 81 milliGRAM(s) Oral daily  atorvastatin 40 milliGRAM(s) Oral at bedtime  dextrose 5%. 1000 milliLiter(s) IV Continuous <Continuous>  dextrose 50% Injectable 25 Gram(s) IV Push once  enalapril 2.5 milliGRAM(s) Oral daily  glucagon  Injectable 1 milliGRAM(s) IntraMuscular once  insulin lispro (ADMELOG) corrective regimen sliding scale   SubCutaneous three times a day before meals  magnesium sulfate  IVPB 2 Gram(s) IV Intermittent once  metoprolol tartrate 25 milliGRAM(s) Oral every 8 hours  pantoprazole    Tablet 40 milliGRAM(s) Oral before breakfast  tamsulosin 0.4 milliGRAM(s) Oral at bedtime    PRN MEDICATIONS  acetaminophen     Tablet .. 650 milliGRAM(s) Oral every 6 hours PRN  aluminum hydroxide/magnesium hydroxide/simethicone Suspension 30 milliLiter(s) Oral every 4 hours PRN  dextrose Oral Gel 15 Gram(s) Oral once PRN  melatonin 3 milliGRAM(s) Oral at bedtime PRN  ondansetron Injectable 4 milliGRAM(s) IV Push every 8 hours PRN    VITALS:   T(F): 98.1  HR: 61  BP: 115/65  RR: 18  SpO2: --    PHYSICAL EXAM:  GENERAL: NAD, well-groomed, well-developed  HEAD:  Atraumatic, Normocephalic  EYES: EOMI  NECK: Supple  NERVOUS SYSTEM:  Alert & Oriented X3, non focal   CHEST/LUNG: Clear to auscultation bilaterally; No rales, rhonchi, wheezing, or rubs  HEART: Regular rate and rhythm; No murmurs, rubs, or gallops  ABDOMEN: Soft, Nontender, Nondistended; Bowel sounds present  EXTREMITIES:  2+ Peripheral Pulses, No clubbing, cyanosis, or edema  LYMPH: No lymphadenopathy noted  SKIN: No rashes or lesions  LABS:                        11.6   3.82  )-----------( 86       ( 04 Jul 2023 07:42 )             32.7     07-04    135  |  102  |  13  ----------------------------<  183<H>  3.8   |  20  |  0.9    Ca    8.0<L>      04 Jul 2023 07:42  Mg     1.6     07-04        Urinalysis Basic - ( 04 Jul 2023 07:42 )    Color: x / Appearance: x / SG: x / pH: x  Gluc: 183 mg/dL / Ketone: x  / Bili: x / Urobili: x   Blood: x / Protein: x / Nitrite: x   Leuk Esterase: x / RBC: x / WBC x   Sq Epi: x / Non Sq Epi: x / Bacteria: x        Lactate, Blood: 1.7 mmol/L (07-04-23 @ 07:42)      Culture - Urine (collected 02 Jul 2023 15:38)  Source: Clean Catch Clean Catch (Midstream)  Final Report (03 Jul 2023 18:32):    No growth    Culture - Blood (collected 02 Jul 2023 11:16)  Source: .Blood Blood-Peripheral  Preliminary Report (03 Jul 2023 21:01):    No growth at 24 hours    Culture - Blood (collected 02 Jul 2023 11:16)  Source: .Blood Blood-Peripheral  Preliminary Report (03 Jul 2023 21:01):    No growth at 24 hours          RADIOLOGY:

## 2023-07-04 NOTE — PROGRESS NOTE ADULT - ASSESSMENT
84 yo M PMHx HTN, DM II, DLD, GERD, prostate cancer (sp seed implant in 2008), paroxysmal Afib, Hx of  SVT presented to the ED for evaluation of DEJESUS found to be covid positive. To note pt was discharged on 7/1 after being treated for SVT.    #Dyspnea  -  Likely Secondary to COVID    - doubt PE  - Isolation precautions (contact, droplet, airborne)  - s/p steroids, pt not hypoxic  - trend inflammatory markers  - WBC downtrended, continue to trend  - Febrile 7/3 afternoon, afebrile since then, continue to monitor   - repeat Lactic acid, downtrending    #SVT s/p DCCV last admission, pAF  - MCOT on dc  - HR controlled  - metoprolol increased to three times daily, will increase BID dose for ease of adherence  - ASA / statin     #Thrombocytopenia   ->131->106->100->86  -Possibly reactive thrombocytopenia due to sepsis   -Pt has h/o ITP s/p rituximab therapy (20 year ago)  -Hem/onc consulted    #R basial nodular density   - keep OP pulm appointment    #DM type II   Hold oral meds;  check fs;  Start insulin  regimen if  serum Glucose >180, start insulin  protocol and adjust insulin  Lantus/Lispro,  Hold for Hypoglycemia Goal  Goal 140-180     #Dyslipidemia   - c/w statin     #CKD IIIa  - stable, trend bmp    #prostate cancer s/p RT/brachytherapy   - c/w home Flomax   82 yo M PMHx HTN, DM II, DLD, GERD, prostate cancer (sp seed implant in 2008), paroxysmal Afib, Hx of  SVT presented to the ED for evaluation of DEJESUS found to be covid positive. To note pt was discharged on 7/1 after being treated for SVT.    #Dyspnea  -  Likely Secondary to COVID    - doubt PE  - Isolation precautions (contact, droplet, airborne)  - s/p steroids, pt not hypoxic  - trend inflammatory markers  - WBC downtrended, continue to trend  - Febrile 7/3 afternoon, afebrile since then, continue to monitor   - repeat Lactic acid, downtrending    #SVT s/p DCCV last admission, pAF  - MCOT on dc  - HR controlled  - metoprolol increased to three times daily, will increase BID dose for ease of adherence  - ASA / statin     #Thrombocytopenia   ->131->106->100->86  -Possibly reactive thrombocytopenia due to sepsis   -Pt has h/o ITP s/p rituximab therapy (20 year ago)  -Heme consulted    #R basial nodular density   - keep OP pulm appointment    #DM type II   Hold oral meds;  check fs;  Start insulin  regimen if  serum Glucose >180, start insulin  protocol and adjust insulin  Lantus/Lispro,  Hold for Hypoglycemia Goal  Goal 140-180     #Dyslipidemia   - c/w statin     #CKD IIIa  - stable, trend bmp    #prostate cancer s/p RT/brachytherapy   - c/w home Flomax

## 2023-07-04 NOTE — PROGRESS NOTE ADULT - SUBJECTIVE AND OBJECTIVE BOX
CHIEF COMPLAINT:    Patient is a 83y old  Male who presents with a chief complaint of Shortness of breath     INTERVAL HPI/OVERNIGHT EVENTS:    Patient seen and examined at bedside. No acute overnight events occurred.    ROS: Denies SOB, chest pain. All other systems are negative.    Medications:  Standing  aspirin  chewable 81 milliGRAM(s) Oral daily  atorvastatin 40 milliGRAM(s) Oral at bedtime  dextrose 5%. 1000 milliLiter(s) IV Continuous <Continuous>  dextrose 50% Injectable 25 Gram(s) IV Push once  enalapril 2.5 milliGRAM(s) Oral daily  glucagon  Injectable 1 milliGRAM(s) IntraMuscular once  insulin lispro (ADMELOG) corrective regimen sliding scale   SubCutaneous three times a day before meals  metoprolol tartrate 25 milliGRAM(s) Oral every 8 hours  pantoprazole    Tablet 40 milliGRAM(s) Oral before breakfast  tamsulosin 0.4 milliGRAM(s) Oral at bedtime    PRN Meds  acetaminophen     Tablet .. 650 milliGRAM(s) Oral every 6 hours PRN  aluminum hydroxide/magnesium hydroxide/simethicone Suspension 30 milliLiter(s) Oral every 4 hours PRN  dextrose Oral Gel 15 Gram(s) Oral once PRN  melatonin 3 milliGRAM(s) Oral at bedtime PRN  ondansetron Injectable 4 milliGRAM(s) IV Push every 8 hours PRN        Vital Signs:    T(F): 100.7 (23 @ 13:27), Max: 100.7 (23 @ 13:27)  HR: 74 (23 @ 13:27) (61 - 74)  BP: 120/65 (23 @ 13:27) (115/65 - 120/65)  RR: 18 (23 @ 13:27) (18 - 18)  SpO2: --  I&O's Summary    2023 07:01  -  2023 07:00  --------------------------------------------------------  IN: 460 mL / OUT: 0 mL / NET: 460 mL      Daily     Daily Weight in k.1 (2023 05:00)  CAPILLARY BLOOD GLUCOSE      POCT Blood Glucose.: 230 mg/dL (2023 11:16)  POCT Blood Glucose.: 185 mg/dL (2023 08:16)  POCT Blood Glucose.: 252 mg/dL (2023 21:27)  POCT Blood Glucose.: 216 mg/dL (2023 16:07)      PHYSICAL EXAM:  GENERAL:  NAD  SKIN: No rashes or lesions  HEENT: Atraumatic. Normocephalic. Anicteric  NECK:  No JVD.   PULMONARY: Clear to ausculation bilaterally. No wheezing. No rales  CVS: Normal S1, S2. Regular rate and rhythm. No murmurs.  ABDOMEN/GI: Soft, Nontender, Nondistended; Bowel sounds are present  EXTREMITIES:  No edema B/L LE.  NEUROLOGIC:  No motor deficit.  PSYCH: Alert & oriented x 3, normal affect      LABS:                        11.6   3.82  )-----------( 86       ( 2023 07:42 )             32.7     07-04    135  |  102  |  13  ----------------------------<  183<H>  3.8   |  20  |  0.9    Ca    8.0<L>      2023 07:42  Mg     1.6     07-          Trop <0.01, CKMB --, CK --, 23 @ 10:09      Culture - Urine (collected 2023 15:38)  Source: Clean Catch Clean Catch (Midstream)  Final Report (2023 18:32):    No growth    Culture - Blood (collected 2023 11:16)  Source: .Blood Blood-Peripheral  Preliminary Report (2023 21:01):    No growth at 24 hours    Culture - Blood (collected 2023 11:16)  Source: .Blood Blood-Peripheral  Preliminary Report (2023 21:01):    No growth at 24 hours        RADIOLOGY & ADDITIONAL TESTS:  Imaging or report Personally Reviewed:  [ ] YES  [ ] NO -->no new images    Telemetry reviewed independently - NSR, no acute events  EKG reviewed independently -->no new EKGs    Consultant(s) Notes Reviewed:  [ ] YES  [ ] NO  Care Discussed with Consultants/Other Providers [ ] YES  [ ] NO    Case discussed with resident  Care discussed with pt

## 2023-07-05 LAB
ANION GAP SERPL CALC-SCNC: 10 MMOL/L — SIGNIFICANT CHANGE UP (ref 7–14)
BUN SERPL-MCNC: 13 MG/DL — SIGNIFICANT CHANGE UP (ref 10–20)
CALCIUM SERPL-MCNC: 8 MG/DL — LOW (ref 8.4–10.5)
CHLORIDE SERPL-SCNC: 104 MMOL/L — SIGNIFICANT CHANGE UP (ref 98–110)
CO2 SERPL-SCNC: 23 MMOL/L — SIGNIFICANT CHANGE UP (ref 17–32)
CREAT SERPL-MCNC: 0.8 MG/DL — SIGNIFICANT CHANGE UP (ref 0.7–1.5)
EGFR: 88 ML/MIN/1.73M2 — SIGNIFICANT CHANGE UP
FERRITIN SERPL-MCNC: 306 NG/ML — SIGNIFICANT CHANGE UP (ref 30–400)
GLUCOSE BLDC GLUCOMTR-MCNC: 156 MG/DL — HIGH (ref 70–99)
GLUCOSE BLDC GLUCOMTR-MCNC: 188 MG/DL — HIGH (ref 70–99)
GLUCOSE BLDC GLUCOMTR-MCNC: 201 MG/DL — HIGH (ref 70–99)
GLUCOSE BLDC GLUCOMTR-MCNC: 219 MG/DL — HIGH (ref 70–99)
GLUCOSE SERPL-MCNC: 192 MG/DL — HIGH (ref 70–99)
HCT VFR BLD CALC: 33.1 % — LOW (ref 42–52)
HGB BLD-MCNC: 11.3 G/DL — LOW (ref 14–18)
MAGNESIUM SERPL-MCNC: 2 MG/DL — SIGNIFICANT CHANGE UP (ref 1.8–2.4)
MCHC RBC-ENTMCNC: 30.1 PG — SIGNIFICANT CHANGE UP (ref 27–31)
MCHC RBC-ENTMCNC: 34.1 G/DL — SIGNIFICANT CHANGE UP (ref 32–37)
MCV RBC AUTO: 88.3 FL — SIGNIFICANT CHANGE UP (ref 80–94)
NRBC # BLD: 0 /100 WBCS — SIGNIFICANT CHANGE UP (ref 0–0)
PLATELET # BLD AUTO: 75 K/UL — LOW (ref 130–400)
PMV BLD: 11.3 FL — HIGH (ref 7.4–10.4)
POTASSIUM SERPL-MCNC: 4.2 MMOL/L — SIGNIFICANT CHANGE UP (ref 3.5–5)
POTASSIUM SERPL-SCNC: 4.2 MMOL/L — SIGNIFICANT CHANGE UP (ref 3.5–5)
RBC # BLD: 3.75 M/UL — LOW (ref 4.7–6.1)
RBC # FLD: 13.4 % — SIGNIFICANT CHANGE UP (ref 11.5–14.5)
SODIUM SERPL-SCNC: 137 MMOL/L — SIGNIFICANT CHANGE UP (ref 135–146)
WBC # BLD: 3.76 K/UL — LOW (ref 4.8–10.8)
WBC # FLD AUTO: 3.76 K/UL — LOW (ref 4.8–10.8)

## 2023-07-05 PROCEDURE — 99222 1ST HOSP IP/OBS MODERATE 55: CPT

## 2023-07-05 PROCEDURE — 99233 SBSQ HOSP IP/OBS HIGH 50: CPT

## 2023-07-05 PROCEDURE — 93010 ELECTROCARDIOGRAM REPORT: CPT

## 2023-07-05 RX ADMIN — TAMSULOSIN HYDROCHLORIDE 0.4 MILLIGRAM(S): 0.4 CAPSULE ORAL at 21:23

## 2023-07-05 RX ADMIN — Medication 4: at 11:55

## 2023-07-05 RX ADMIN — Medication 2.5 MILLIGRAM(S): at 05:56

## 2023-07-05 RX ADMIN — Medication 4: at 16:45

## 2023-07-05 RX ADMIN — Medication 2: at 07:43

## 2023-07-05 RX ADMIN — Medication 81 MILLIGRAM(S): at 13:12

## 2023-07-05 RX ADMIN — ATORVASTATIN CALCIUM 40 MILLIGRAM(S): 80 TABLET, FILM COATED ORAL at 21:23

## 2023-07-05 RX ADMIN — PANTOPRAZOLE SODIUM 40 MILLIGRAM(S): 20 TABLET, DELAYED RELEASE ORAL at 06:23

## 2023-07-05 RX ADMIN — Medication 25 MILLIGRAM(S): at 13:12

## 2023-07-05 RX ADMIN — Medication 25 MILLIGRAM(S): at 05:56

## 2023-07-05 RX ADMIN — Medication 25 MILLIGRAM(S): at 21:23

## 2023-07-05 NOTE — CONSULT NOTE ADULT - ATTENDING COMMENTS
Patient also seen and examined by myself. I agree with the medical resident's note above. Situation discussed with him and the patient.  All questions answered.

## 2023-07-05 NOTE — PROGRESS NOTE ADULT - ASSESSMENT
82 yo M PMHx HTN, DM II, DLD, GERD, prostate cancer (sp seed implant in 2008), paroxysmal Afib, Hx of  SVT presented to the ED for evaluation of DEJESUS found to be covid positive. To note pt was discharged on 7/1 after being treated for SVT.    #Dyspnea  -  Likely Secondary to COVID    - doubt PE  - Isolation precautions (contact, droplet, airborne)  - s/p steroids, pt not hypoxic  - trend inflammatory markers  - WBC downtrended, continue to trend  - Febrile to 100.7 7/4 afternoon, afebrile since then, continue to monitor     #SVT s/p DCCV last admission, pAF  - MCOT on dc  - HR controlled  - metoprolol increased to three times daily, will increase BID dose for ease of adherence  - ASA / statin     #Thrombocytopenia   ->131->106->100->86  -Possibly reactive thrombocytopenia due to sepsis   -Pt has h/o ITP s/p rituximab therapy (20 year ago)  -F/u heme consult     #R basial nodular density   - keep OP pulm appointment    #DM type II   Hold oral meds;  check fs;  Start insulin  regimen if  serum Glucose >180, start insulin  protocol and adjust insulin  Lantus/Lispro,  Hold for Hypoglycemia Goal  Goal 140-180     #Dyslipidemia   - c/w statin     #CKD IIIa  - stable, trend bmp    #prostate cancer s/p RT/brachytherapy   - c/w home Flomax

## 2023-07-05 NOTE — CONSULT NOTE ADULT - SUBJECTIVE AND OBJECTIVE BOX
DIANE GUERRERO 83y Male  MRN#: [id number]   CODE STATUS: Full     Hospital Day: 3d    Pt is currently admitted with the primary diagnosis of Mild AHRF in setting of COVID 19 PNA    SUBJECTIVE  Hospital Course: Patient clinically and HD stable. Received x3 covid vaccinations as OP. Weaned down of O2 and saturating well. We are consulted for thrombocytopenia. No mucosal or concern for active bleeding.     Overnight events: No acute overnight events.      Subjective complaints: None reported.      OBJECTIVE  PAST MEDICAL & SURGICAL HISTORY  Diabetes mellitus  Hypertension  Paroxysmal atrial fibrillation  Prostate cancer  Status post cervical spinal fusion  ??? ITP                                            -----------------------------------------------------------  ALLERGIES:  No Known Allergies                                            ------------------------------------------------------------    HOME MEDICATIONS  Home Medications:  aspirin 81 mg oral tablet: 1 tab(s) orally once a day (29 Jun 2023 17:16)  Coenzyme Q10 200 mg oral capsule: 200 tab(s) orally once a day (29 Jun 2023 17:16)  D3 25 mcg (1000 intl units) oral tablet: 1 tab(s) orally once a day (29 Jun 2023 17:16)  enalapril 2.5 mg oral tablet: 1 tab(s) orally once a day (29 Jun 2023 17:16)  Flomax 0.4 mg oral capsule: 1 tab(s) orally 2 times a day (29 Jun 2023 17:16)  glimepiride 1 mg oral tablet: 1 tab(s) orally 2 times a day (29 Jun 2023 17:16)  metFORMIN 1000 mg oral tablet: 1 tab(s) orally 2 times a day (29 Jun 2023 17:16)  metoprolol tartrate 25 mg oral tablet: 1 tab(s) orally 2 times a day (01 Jul 2023 09:41)  Omega-3 Fish Oil 1000 mg oral capsule: 1 tab(s) orally 2 times a day (29 Jun 2023 17:16)  omeprazole 20 mg oral delayed release tablet: 1 tab(s) orally once a day (29 Jun 2023 17:16)  simvastatin 40 mg oral tablet: 1 tab(s) orally once a day (29 Jun 2023 17:16)                           MEDICATIONS:  STANDING MEDICATIONS  aspirin  chewable 81 milliGRAM(s) Oral daily  atorvastatin 40 milliGRAM(s) Oral at bedtime  dextrose 5%. 1000 milliLiter(s) IV Continuous <Continuous>  dextrose 50% Injectable 25 Gram(s) IV Push once  enalapril 2.5 milliGRAM(s) Oral daily  glucagon  Injectable 1 milliGRAM(s) IntraMuscular once  insulin lispro (ADMELOG) corrective regimen sliding scale   SubCutaneous three times a day before meals  metoprolol tartrate 25 milliGRAM(s) Oral every 8 hours  pantoprazole    Tablet 40 milliGRAM(s) Oral before breakfast  tamsulosin 0.4 milliGRAM(s) Oral at bedtime    PRN MEDICATIONS  acetaminophen     Tablet .. 650 milliGRAM(s) Oral every 6 hours PRN  aluminum hydroxide/magnesium hydroxide/simethicone Suspension 30 milliLiter(s) Oral every 4 hours PRN  dextrose Oral Gel 15 Gram(s) Oral once PRN  melatonin 3 milliGRAM(s) Oral at bedtime PRN  ondansetron Injectable 4 milliGRAM(s) IV Push every 8 hours PRN                                            ------------------------------------------------------------  VITAL SIGNS: Last 24 Hours  T(C): 35.9 (04 Jul 2023 20:40), Max: 38.2 (04 Jul 2023 13:27)  T(F): 96.6 (04 Jul 2023 20:40), Max: 100.7 (04 Jul 2023 13:27)  HR: 60 (04 Jul 2023 20:40) (60 - 74)  BP: 126/66 (04 Jul 2023 20:40) (120/65 - 126/66)  BP(mean): --  RR: 18 (04 Jul 2023 20:40) (18 - 18)  SpO2: --    07-05-23 @ 07:01  -  07-05-23 @ 12:05  --------------------------------------------------------  IN: 250 mL / OUT: 250 mL / NET: 0 mL                                           --------------------------------------------------------------  LABS:                        11.3   3.76  )-----------( 75       ( 05 Jul 2023 08:42 )             33.1     07-05    137  |  104  |  13  ----------------------------<  192<H>  4.2   |  23  |  0.8    Ca    8.0<L>      05 Jul 2023 08:42  Mg     2.0     07-05    Urinalysis Basic - ( 05 Jul 2023 08:42 )  Color: x / Appearance: x / SG: x / pH: x  Gluc: 192 mg/dL / Ketone: x  / Bili: x / Urobili: x   Blood: x / Protein: x / Nitrite: x   Leuk Esterase: x / RBC: x / WBC x   Sq Epi: x / Non Sq Epi: x / Bacteria: x    Culture - Blood (collected 03 Jul 2023 19:20)  Source: .Blood None  Preliminary Report (05 Jul 2023 01:02):    No growth at 24 hours    Culture - Urine (collected 02 Jul 2023 15:38)  Source: Clean Catch Clean Catch (Midstream)  Final Report (03 Jul 2023 18:32):    No growth                                  PHYSICAL EXAM:  General: AOx3, resting comfortably in bed   HEENT: PERRLA  LUNGS: GBAE  HEART: RRR, S1S2  ABDOMEN: Soft, Non-tender  EXT: No LE edema  NEURO: 2-12 Intact   SKIN: No lesions                                                                                     DIANE GUERRERO 83y Male  MRN#: [id number]   CODE STATUS: Full     Hospital Day: 3d    Pt is currently admitted with the primary diagnosis of Mild AHRF in setting of COVID 19 PNA. Hematology consultation called because of thrombocytopenia and history of ITP years ago.    SUBJECTIVE  Hospital Course: Patient clinically and HD stable. Received x3 Covid vaccinations as OP. Weaned down of O2 and saturating well. We are consulted for thrombocytopenia gradually worsening. No mucosal or other concerns for active bleeding or bruising.     Overnight events: No acute overnight events.      Subjective complaints: None reported.      OBJECTIVE  PAST MEDICAL & SURGICAL HISTORY  Diabetes mellitus  Hypertension  Paroxysmal atrial fibrillation  Prostate cancer  Status post cervical spinal fusion  ??? ITP                                            -----------------------------------------------------------  ALLERGIES:  No Known Allergies                                            ------------------------------------------------------------    HOME MEDICATIONS  Home Medications:  aspirin 81 mg oral tablet: 1 tab(s) orally once a day (29 Jun 2023 17:16)  Coenzyme Q10 200 mg oral capsule: 200 tab(s) orally once a day (29 Jun 2023 17:16)  D3 25 mcg (1000 intl units) oral tablet: 1 tab(s) orally once a day (29 Jun 2023 17:16)  enalapril 2.5 mg oral tablet: 1 tab(s) orally once a day (29 Jun 2023 17:16)  Flomax 0.4 mg oral capsule: 1 tab(s) orally 2 times a day (29 Jun 2023 17:16)  glimepiride 1 mg oral tablet: 1 tab(s) orally 2 times a day (29 Jun 2023 17:16)  metFORMIN 1000 mg oral tablet: 1 tab(s) orally 2 times a day (29 Jun 2023 17:16)  metoprolol tartrate 25 mg oral tablet: 1 tab(s) orally 2 times a day (01 Jul 2023 09:41)  Omega-3 Fish Oil 1000 mg oral capsule: 1 tab(s) orally 2 times a day (29 Jun 2023 17:16)  omeprazole 20 mg oral delayed release tablet: 1 tab(s) orally once a day (29 Jun 2023 17:16)  simvastatin 40 mg oral tablet: 1 tab(s) orally once a day (29 Jun 2023 17:16)                           MEDICATIONS:  STANDING MEDICATIONS  aspirin  chewable 81 milliGRAM(s) Oral daily  atorvastatin 40 milliGRAM(s) Oral at bedtime  dextrose 5%. 1000 milliLiter(s) IV Continuous <Continuous>  dextrose 50% Injectable 25 Gram(s) IV Push once  enalapril 2.5 milliGRAM(s) Oral daily  glucagon  Injectable 1 milliGRAM(s) IntraMuscular once  insulin lispro (ADMELOG) corrective regimen sliding scale   SubCutaneous three times a day before meals  metoprolol tartrate 25 milliGRAM(s) Oral every 8 hours  pantoprazole    Tablet 40 milliGRAM(s) Oral before breakfast  tamsulosin 0.4 milliGRAM(s) Oral at bedtime    PRN MEDICATIONS  acetaminophen     Tablet .. 650 milliGRAM(s) Oral every 6 hours PRN  aluminum hydroxide/magnesium hydroxide/simethicone Suspension 30 milliLiter(s) Oral every 4 hours PRN  dextrose Oral Gel 15 Gram(s) Oral once PRN  melatonin 3 milliGRAM(s) Oral at bedtime PRN  ondansetron Injectable 4 milliGRAM(s) IV Push every 8 hours PRN                                            ------------------------------------------------------------  VITAL SIGNS: Last 24 Hours  T(C): 35.9 (04 Jul 2023 20:40), Max: 38.2 (04 Jul 2023 13:27)  T(F): 96.6 (04 Jul 2023 20:40), Max: 100.7 (04 Jul 2023 13:27)  HR: 60 (04 Jul 2023 20:40) (60 - 74)  BP: 126/66 (04 Jul 2023 20:40) (120/65 - 126/66)  BP(mean): --  RR: 18 (04 Jul 2023 20:40) (18 - 18)  SpO2: --    07-05-23 @ 07:01  -  07-05-23 @ 12:05  --------------------------------------------------------  IN: 250 mL / OUT: 250 mL / NET: 0 mL                                           --------------------------------------------------------------  LABS:                        11.3   3.76  )-----------( 75       ( 05 Jul 2023 08:42 )             33.1     07-05    137  |  104  |  13  ----------------------------<  192<H>  4.2   |  23  |  0.8    Ca    8.0<L>      05 Jul 2023 08:42  Mg     2.0     07-05    Urinalysis Basic - ( 05 Jul 2023 08:42 )  Color: x / Appearance: x / SG: x / pH: x  Gluc: 192 mg/dL / Ketone: x  / Bili: x / Urobili: x   Blood: x / Protein: x / Nitrite: x   Leuk Esterase: x / RBC: x / WBC x   Sq Epi: x / Non Sq Epi: x / Bacteria: x    Culture - Blood (collected 03 Jul 2023 19:20)  Source: .Blood None  Preliminary Report (05 Jul 2023 01:02):    No growth at 24 hours    Culture - Urine (collected 02 Jul 2023 15:38)  Source: Clean Catch Clean Catch (Midstream)  Final Report (03 Jul 2023 18:32):    No growth                                  PHYSICAL EXAM:  General: AOx3, resting comfortably in bed   HEENT: PERRLA  LUNGS: GBAE  HEART: RR, S1S2  ABDOMEN: Soft, Non-tender  EXT: No LE edema  NEURO: 2-12 Intact   SKIN: No lesions

## 2023-07-05 NOTE — CONSULT NOTE ADULT - ASSESSMENT
ASSESSMENT & PLAN:   84 yo M PMHx HTN, DM II, DLD, GERD, prostate cancer (sp seed implant in 2008), paroxysmal Afib, Hx of  SVT presented to the ED for evaluation of DEJESUS found to be covid positive. To note pt was discharged on 7/1 after being treated for SVT.     #Thrombocytopenia   - Patient reports diagnoses of ITP 20+ years ago before a surgery, Irresponsive to steroids but remitted with rituximab   - Never followed up with Hematologist and says his normal PLT count is around 100K  - On 06/29/23 , 07/01/23 , 07/05/23 PLT 75   - 4 T score 1   - Unlikely consumptive in setting of sepsis   - Trend PLT, and monitor for any bleed ASSESSMENT & PLAN:   82 yo M PMHx HTN, DM II, DLD, GERD, prostate cancer (sp seed implant in 2008), paroxysmal Afib, Hx of  SVT presented to the ED for evaluation of DEJESUS found to be covid positive. To note pt was discharged on 7/1 after being treated for SVT.     # Thrombocytopenia   # Remote h/o ITP  - Patient reports diagnoses of ITP 20+ years ago before a surgery, Irresponsive to steroids but remitted with rituximab   - Never followed up with Hematologist and says his normal PLT count is around 100K  - On 06/29/23 , 07/01/23 , 07/05/23 PLT 75   - No administration of heparin this admission, HIT unlikely  - likely 2/2 COVID   - check LE duplex  - Daily CBC, if continues to drop will reassess for treatment of ITP ASSESSMENT & PLAN:   84 yo M PMHx HTN, DM II, DLD, GERD, prostate cancer (sp seed implant in 2008), paroxysmal Afib, Hx of  SVT presented to the ED for evaluation of DEJESUS found to be covid positive. To note pt was discharged on 7/1 after being treated for SVT.     # Thrombocytopenia   # Remote h/o ITP  - Patient reports diagnoses of ITP 20+ years ago before a surgery, Irresponsive to steroids but remitted with rituximab   - Never followed up with hematologist and says his normal PLT count is around 100K  - On 06/29/23 , 07/01/23 , 07/05/23 PLT 75   - No administration of heparin this admission, thus HIT not an issue. Delayed HIT unlikely also.  - Likely 2/2 COVID infection.   - Check LE duplex.  - Daily CBC, if continues to drop will reassess for treatment of ITP. At present, he can be observed.

## 2023-07-05 NOTE — PROGRESS NOTE ADULT - SUBJECTIVE AND OBJECTIVE BOX
24H events:    Patient is a 83y old Male who presents with a chief complaint of Mild AHRF in setting of COVID 19 PNA (05 Jul 2023 12:04)    Primary diagnosis of Fever       Today is hospital day 3d. This morning patient was seen and examined at bedside, resting comfortably in bed.  No active complaints.     PAST MEDICAL & SURGICAL HISTORY  Diabetes mellitus    Hypertension    Paroxysmal atrial fibrillation    Prostate cancer    Status post cervical spinal fusion      SOCIAL HISTORY:  Social History:  Lives at home with family, no needs at baseline (02 Jul 2023 17:57)      ALLERGIES:  No Known Allergies    MEDICATIONS:  STANDING MEDICATIONS  aspirin  chewable 81 milliGRAM(s) Oral daily  atorvastatin 40 milliGRAM(s) Oral at bedtime  dextrose 5%. 1000 milliLiter(s) IV Continuous <Continuous>  dextrose 50% Injectable 25 Gram(s) IV Push once  enalapril 2.5 milliGRAM(s) Oral daily  glucagon  Injectable 1 milliGRAM(s) IntraMuscular once  insulin lispro (ADMELOG) corrective regimen sliding scale   SubCutaneous three times a day before meals  metoprolol tartrate 25 milliGRAM(s) Oral every 8 hours  pantoprazole    Tablet 40 milliGRAM(s) Oral before breakfast  tamsulosin 0.4 milliGRAM(s) Oral at bedtime    PRN MEDICATIONS  acetaminophen     Tablet .. 650 milliGRAM(s) Oral every 6 hours PRN  aluminum hydroxide/magnesium hydroxide/simethicone Suspension 30 milliLiter(s) Oral every 4 hours PRN  dextrose Oral Gel 15 Gram(s) Oral once PRN  melatonin 3 milliGRAM(s) Oral at bedtime PRN  ondansetron Injectable 4 milliGRAM(s) IV Push every 8 hours PRN    VITALS:   T(F): 98.8  HR: 65  BP: 144/68  RR: 18  SpO2: --    PHYSICAL EXAM:  GENERAL: NAD, well-groomed, well-developed  HEAD:  Atraumatic, Normocephalic  EYES: EOMI  NECK: Supple  NERVOUS SYSTEM:  Alert & Oriented X3, non focal   CHEST/LUNG: Clear to auscultation bilaterally; No rales, rhonchi, wheezing, or rubs  HEART: Regular rate and rhythm; No murmurs, rubs, or gallops  ABDOMEN: Soft, Nontender, Nondistended; Bowel sounds present  EXTREMITIES:  2+ Peripheral Pulses, No clubbing, cyanosis, or edema  LYMPH: No lymphadenopathy noted  SKIN: No rashes or lesions    LABS:                        11.3   3.76  )-----------( 75       ( 05 Jul 2023 08:42 )             33.1     07-05    137  |  104  |  13  ----------------------------<  192<H>  4.2   |  23  |  0.8    Ca    8.0<L>      05 Jul 2023 08:42  Mg     2.0     07-05        Urinalysis Basic - ( 05 Jul 2023 08:42 )    Color: x / Appearance: x / SG: x / pH: x  Gluc: 192 mg/dL / Ketone: x  / Bili: x / Urobili: x   Blood: x / Protein: x / Nitrite: x   Leuk Esterase: x / RBC: x / WBC x   Sq Epi: x / Non Sq Epi: x / Bacteria: x            Culture - Blood (collected 03 Jul 2023 19:20)  Source: .Blood None  Preliminary Report (05 Jul 2023 01:02):    No growth at 24 hours    Culture - Urine (collected 02 Jul 2023 15:38)  Source: Clean Catch Clean Catch (Midstream)  Final Report (03 Jul 2023 18:32):    No growth          RADIOLOGY:

## 2023-07-05 NOTE — PROGRESS NOTE ADULT - ASSESSMENT
83-year-old man with PMHx of HTN, DM, DLD, GERD, prostate cancer (sp seed implant in 2008), pAfib, Hx of  SVT follows with Dr. Motta presenting to the ED for evaluation of acute dyspnea on exertion that began this morning.    Sepsis on admission T>101 P>90 due to COVID19, non-severe, not requiring supplemental O2   SVT  DM-2 / HTN / DL  H/O Prostate Ca.   Thrombocytopenia  PAF              PLAN:    ·	Pt is not requiring any O2. On RA. Was offered Remdesivir by the ID but refused it.  ·	Blood cx is negative  ·	CTA chest noted. Poor study. No evidence of central large PE.   ·	Recent ECHO showed EF is 58% 83-year-old man with PMHx of HTN, DM, DLD, GERD, prostate cancer (sp seed implant in 2008), pAfib, Hx of  SVT follows with Dr. Motta presenting to the ED for evaluation of acute dyspnea on exertion that began this morning.    Sepsis on admission T>101 P>90 due to COVID19, non-severe, not requiring supplemental O2   SVT  DM-2 / HTN / DL  H/O Prostate Ca.   Thrombocytopenia  PAF              PLAN:    ·	Pt is not requiring any O2. On RA. Was offered Remdesivir by the ID but refused it.  ·	Blood cx is negative  ·	CTA chest noted. Poor study. No evidence of central large PE.   ·	Recent ECHO showed EF is 58%  ·	D-dimer is 245 and procalcitonin is 0.07  ·	Percent sat is 12 and serum iron is 29  ·	Check stool guaiac and monitor H/H  ·	Start him on Venofer 200 mg iv daily x 5 doses  ·	Platelets count is trending down. H/O ITP in the past  ·	Hem/Onc eval  ·	Remote H/O PAF. Not on A/C  ·	EP eval noted. Recommended [facility] on d/c  ·	Monitor FS. On Insulin sliding scale.     Progress Note Handoff    Pending (specify):  Consults_________, Tests________, Test Results_______, Other__[facility] on d/c. Monitoring platelets count_______  Family discussion:  Disposition: Home___/SNF___/Other________/Unknown at this time________    Mickey Avalos MD  Spectra: [id number]

## 2023-07-05 NOTE — PROGRESS NOTE ADULT - SUBJECTIVE AND OBJECTIVE BOX
DIANE GUERRERO  83y Male    CHIEF COMPLAINT:    Patient is a 83y old  Male who presents with a chief complaint of Mild AHRF in setting of COVID 19 PNA (2023 12:04)      INTERVAL HPI/OVERNIGHT EVENTS:    Patient seen and examined.    ROS: All other systems are negative.    Vital Signs:    T(F): 98.8 (23 @ 13:18), Max: 98.8 (23 @ 13:18)  HR: 65 (23 @ 13:18) (60 - 65)  BP: 144/68 (23 @ 13:18) (126/66 - 144/68)  RR: 18 (23 @ 13:18) (18 - 18)  SpO2: --  I&O's Summary    2023 07:01  -  2023 15:48  --------------------------------------------------------  IN: 250 mL / OUT: 250 mL / NET: 0 mL      Daily     Daily Weight in k (2023 20:40)  CAPILLARY BLOOD GLUCOSE      POCT Blood Glucose.: 219 mg/dL (2023 11:29)  POCT Blood Glucose.: 188 mg/dL (2023 07:38)  POCT Blood Glucose.: 228 mg/dL (2023 21:22)  POCT Blood Glucose.: 199 mg/dL (2023 16:36)      PHYSICAL EXAM:    GENERAL:  NAD  SKIN: No rashes or lesions  HENT: Atraumatic. Normocephalic. PERRL. Moist membranes.  NECK: Supple, No JVD. No lymphadenopathy.  PULMONARY: CTA B/L. No wheezing. No rales  CVS: Normal S1, S2. Rate and Rhythm are regular. No murmurs.  ABDOMEN/GI: Soft, Nontender, Nondistended; BS present  EXTREMITIES: Peripheral pulses intact. No edema B/L LE.  NEUROLOGIC:  No motor or sensory deficit.  PSYCH: Alert & oriented x 3    Consultant(s) Notes Reviewed:  [x ] YES  [ ] NO  Care Discussed with Consultants/Other Providers [ x] YES  [ ] NO    EKG reviewed  Telemetry reviewed    LABS:                        11.3   3.76  )-----------( 75       ( 2023 08:42 )             33.1     07    137  |  104  |  13  ----------------------------<  192<H>  4.2   |  23  |  0.8    Ca    8.0<L>      2023 08:42  Mg     2.0     -              Culture - Blood (collected 2023 19:20)  Source: .Blood None  Preliminary Report (2023 01:02):    No growth at 24 hours        RADIOLOGY & ADDITIONAL TESTS:    < from: NM Nuclear Stress Pharmacologic Multiple (23 @ 14:16) >  Impression:  1. NORMAL LEXISCAN / REST MYOCARDIAL PERFUSION TOMOGRAPHY, WITH NO   EVIDENCE FOR ISCHEMIA DURING LEXISCAN INFUSION.  2. NORMAL RESTING LEFT VENTRICULAR WALL MOTION AND WALL THICKENING.  3. LEFT VENTRICULAR EJECTION FRACTION OF  58 % WHICH IS WITHIN RANGE OF   NORMAL.    < end of copied text >  < from: CT Angio Chest PE Protocol w/ IV Cont (23 @ 13:57) >  impression:    Poor evaluation for pulmonary embolism due to suboptimal contrast   opacification of the pulmonary artery and its branches. No definite   evidence of central large pulmonary embolism.    Additional findings are unchanged in this short interval follow-up CT   when compared to CTA chest 2023    < end of copied text >  < from: TTE Echo Complete w/o Contrast w/ Doppler (23 @ 14:35) >    Summary:   1. LV Ejection Fraction by Nunes's Method with a biplane EF of 58 %.   2. Normal global left ventricular systolic function.   3. Normal left atrial size.   4. Normal right atrial size.    < end of copied text >    Imaging or report Personally Reviewed:  [x ] YES  [ ] NO    Medications:  Standing  aspirin  chewable 81 milliGRAM(s) Oral daily  atorvastatin 40 milliGRAM(s) Oral at bedtime  dextrose 5%. 1000 milliLiter(s) IV Continuous <Continuous>  dextrose 50% Injectable 25 Gram(s) IV Push once  enalapril 2.5 milliGRAM(s) Oral daily  glucagon  Injectable 1 milliGRAM(s) IntraMuscular once  insulin lispro (ADMELOG) corrective regimen sliding scale   SubCutaneous three times a day before meals  metoprolol tartrate 25 milliGRAM(s) Oral every 8 hours  pantoprazole    Tablet 40 milliGRAM(s) Oral before breakfast  tamsulosin 0.4 milliGRAM(s) Oral at bedtime    PRN Meds  acetaminophen     Tablet .. 650 milliGRAM(s) Oral every 6 hours PRN  aluminum hydroxide/magnesium hydroxide/simethicone Suspension 30 milliLiter(s) Oral every 4 hours PRN  dextrose Oral Gel 15 Gram(s) Oral once PRN  melatonin 3 milliGRAM(s) Oral at bedtime PRN  ondansetron Injectable 4 milliGRAM(s) IV Push every 8 hours PRN      Case discussed with resident    Care discussed with pt/family           DIANE GUERRERO  83y Male    CHIEF COMPLAINT:    Patient is a 83y old  Male who presents with a chief complaint of Mild AHRF in setting of COVID 19 PNA (2023 12:04)      INTERVAL HPI/OVERNIGHT EVENTS:    Patient seen and examined. Denies any sob. No cough or fever. No palpitations. Denies any bleeding    ROS: All other systems are negative.    Vital Signs:    T(F): 98.8 (23 @ 13:18), Max: 98.8 (23 @ 13:18)  HR: 65 (23 @ 13:18) (60 - 65)  BP: 144/68 (23 @ 13:18) (126/66 - 144/68)  RR: 18 (23 @ 13:18) (18 - 18)  SpO2: --  I&O's Summary    2023 07:01  -  2023 15:48  --------------------------------------------------------  IN: 250 mL / OUT: 250 mL / NET: 0 mL      Daily     Daily Weight in k (2023 20:40)  CAPILLARY BLOOD GLUCOSE      POCT Blood Glucose.: 219 mg/dL (2023 11:29)  POCT Blood Glucose.: 188 mg/dL (2023 07:38)  POCT Blood Glucose.: 228 mg/dL (2023 21:22)  POCT Blood Glucose.: 199 mg/dL (2023 16:36)      PHYSICAL EXAM:    GENERAL:  NAD  SKIN: No rashes or lesions  HENT: Atraumatic. Normocephalic. PERRL. Moist membranes.  NECK: Supple, No JVD. No lymphadenopathy.  PULMONARY: CTA B/L. No wheezing. No rales  CVS: Normal S1, S2. Rate and Rhythm are regular. No murmurs.  ABDOMEN/GI: Soft, Nontender, Nondistended; BS present  EXTREMITIES: Peripheral pulses intact. No edema B/L LE.  NEUROLOGIC:  No motor or sensory deficit.  PSYCH: Alert & oriented x 3    Consultant(s) Notes Reviewed:  [x ] YES  [ ] NO  Care Discussed with Consultants/Other Providers [ x] YES  [ ] NO    EKG reviewed  Telemetry reviewed    LABS:                        11.3   3.76  )-----------( 75       ( 2023 08:42 )             33.1         137  |  104  |  13  ----------------------------<  192<H>  4.2   |  23  |  0.8    Ca    8.0<L>      2023 08:42  Mg     2.0     -              Culture - Blood (collected 2023 19:20)  Source: .Blood None  Preliminary Report (2023 01:02):    No growth at 24 hours        RADIOLOGY & ADDITIONAL TESTS:    < from: NM Nuclear Stress Pharmacologic Multiple (23 @ 14:16) >  Impression:  1. NORMAL LEXISCAN / REST MYOCARDIAL PERFUSION TOMOGRAPHY, WITH NO   EVIDENCE FOR ISCHEMIA DURING LEXISCAN INFUSION.  2. NORMAL RESTING LEFT VENTRICULAR WALL MOTION AND WALL THICKENING.  3. LEFT VENTRICULAR EJECTION FRACTION OF  58 % WHICH IS WITHIN RANGE OF   NORMAL.    < end of copied text >  < from: CT Angio Chest PE Protocol w/ IV Cont (23 @ 13:57) >  impression:    Poor evaluation for pulmonary embolism due to suboptimal contrast   opacification of the pulmonary artery and its branches. No definite   evidence of central large pulmonary embolism.    Additional findings are unchanged in this short interval follow-up CT   when compared to CTA chest 2023    < end of copied text >  < from: TTE Echo Complete w/o Contrast w/ Doppler (23 @ 14:35) >    Summary:   1. LV Ejection Fraction by Nunes's Method with a biplane EF of 58 %.   2. Normal global left ventricular systolic function.   3. Normal left atrial size.   4. Normal right atrial size.    < end of copied text >    Imaging or report Personally Reviewed:  [x ] YES  [ ] NO    Medications:  Standing  aspirin  chewable 81 milliGRAM(s) Oral daily  atorvastatin 40 milliGRAM(s) Oral at bedtime  dextrose 5%. 1000 milliLiter(s) IV Continuous <Continuous>  dextrose 50% Injectable 25 Gram(s) IV Push once  enalapril 2.5 milliGRAM(s) Oral daily  glucagon  Injectable 1 milliGRAM(s) IntraMuscular once  insulin lispro (ADMELOG) corrective regimen sliding scale   SubCutaneous three times a day before meals  metoprolol tartrate 25 milliGRAM(s) Oral every 8 hours  pantoprazole    Tablet 40 milliGRAM(s) Oral before breakfast  tamsulosin 0.4 milliGRAM(s) Oral at bedtime    PRN Meds  acetaminophen     Tablet .. 650 milliGRAM(s) Oral every 6 hours PRN  aluminum hydroxide/magnesium hydroxide/simethicone Suspension 30 milliLiter(s) Oral every 4 hours PRN  dextrose Oral Gel 15 Gram(s) Oral once PRN  melatonin 3 milliGRAM(s) Oral at bedtime PRN  ondansetron Injectable 4 milliGRAM(s) IV Push every 8 hours PRN      Case discussed with resident    Care discussed with pt/family

## 2023-07-06 ENCOUNTER — TRANSCRIPTION ENCOUNTER (OUTPATIENT)
Age: 83
End: 2023-07-06

## 2023-07-06 LAB
ANION GAP SERPL CALC-SCNC: 14 MMOL/L — SIGNIFICANT CHANGE UP (ref 7–14)
BUN SERPL-MCNC: 14 MG/DL — SIGNIFICANT CHANGE UP (ref 10–20)
CALCIUM SERPL-MCNC: 8 MG/DL — LOW (ref 8.4–10.5)
CHLORIDE SERPL-SCNC: 104 MMOL/L — SIGNIFICANT CHANGE UP (ref 98–110)
CO2 SERPL-SCNC: 17 MMOL/L — SIGNIFICANT CHANGE UP (ref 17–32)
CREAT SERPL-MCNC: 0.9 MG/DL — SIGNIFICANT CHANGE UP (ref 0.7–1.5)
EGFR: 85 ML/MIN/1.73M2 — SIGNIFICANT CHANGE UP
GLUCOSE BLDC GLUCOMTR-MCNC: 173 MG/DL — HIGH (ref 70–99)
GLUCOSE BLDC GLUCOMTR-MCNC: 197 MG/DL — HIGH (ref 70–99)
GLUCOSE BLDC GLUCOMTR-MCNC: 211 MG/DL — HIGH (ref 70–99)
GLUCOSE BLDC GLUCOMTR-MCNC: 248 MG/DL — HIGH (ref 70–99)
GLUCOSE SERPL-MCNC: 204 MG/DL — HIGH (ref 70–99)
HCT VFR BLD CALC: 34.6 % — LOW (ref 42–52)
HGB BLD-MCNC: 11.9 G/DL — LOW (ref 14–18)
MAGNESIUM SERPL-MCNC: 1.9 MG/DL — SIGNIFICANT CHANGE UP (ref 1.8–2.4)
MCHC RBC-ENTMCNC: 30.2 PG — SIGNIFICANT CHANGE UP (ref 27–31)
MCHC RBC-ENTMCNC: 34.4 G/DL — SIGNIFICANT CHANGE UP (ref 32–37)
MCV RBC AUTO: 87.8 FL — SIGNIFICANT CHANGE UP (ref 80–94)
NRBC # BLD: 0 /100 WBCS — SIGNIFICANT CHANGE UP (ref 0–0)
PLATELET # BLD AUTO: 70 K/UL — LOW (ref 130–400)
PMV BLD: 10.8 FL — HIGH (ref 7.4–10.4)
POTASSIUM SERPL-MCNC: 4.5 MMOL/L — SIGNIFICANT CHANGE UP (ref 3.5–5)
POTASSIUM SERPL-SCNC: 4.5 MMOL/L — SIGNIFICANT CHANGE UP (ref 3.5–5)
RBC # BLD: 3.94 M/UL — LOW (ref 4.7–6.1)
RBC # FLD: 13.2 % — SIGNIFICANT CHANGE UP (ref 11.5–14.5)
SODIUM SERPL-SCNC: 135 MMOL/L — SIGNIFICANT CHANGE UP (ref 135–146)
WBC # BLD: 3.98 K/UL — LOW (ref 4.8–10.8)
WBC # FLD AUTO: 3.98 K/UL — LOW (ref 4.8–10.8)

## 2023-07-06 PROCEDURE — 99233 SBSQ HOSP IP/OBS HIGH 50: CPT

## 2023-07-06 PROCEDURE — 93970 EXTREMITY STUDY: CPT | Mod: 26

## 2023-07-06 RX ORDER — IRON SUCROSE 20 MG/ML
200 INJECTION, SOLUTION INTRAVENOUS EVERY 24 HOURS
Refills: 0 | Status: DISCONTINUED | OUTPATIENT
Start: 2023-07-07 | End: 2023-07-07

## 2023-07-06 RX ORDER — IRON SUCROSE 20 MG/ML
200 INJECTION, SOLUTION INTRAVENOUS ONCE
Refills: 0 | Status: COMPLETED | OUTPATIENT
Start: 2023-07-06 | End: 2023-07-06

## 2023-07-06 RX ORDER — INSULIN LISPRO 100/ML
4 VIAL (ML) SUBCUTANEOUS ONCE
Refills: 0 | Status: COMPLETED | OUTPATIENT
Start: 2023-07-06 | End: 2023-07-06

## 2023-07-06 RX ORDER — IRON SUCROSE 20 MG/ML
200 INJECTION, SOLUTION INTRAVENOUS EVERY 24 HOURS
Refills: 0 | Status: DISCONTINUED | OUTPATIENT
Start: 2023-07-06 | End: 2023-07-06

## 2023-07-06 RX ADMIN — ATORVASTATIN CALCIUM 40 MILLIGRAM(S): 80 TABLET, FILM COATED ORAL at 21:32

## 2023-07-06 RX ADMIN — Medication 2: at 08:22

## 2023-07-06 RX ADMIN — Medication 2: at 11:50

## 2023-07-06 RX ADMIN — Medication 4: at 16:57

## 2023-07-06 RX ADMIN — PANTOPRAZOLE SODIUM 40 MILLIGRAM(S): 20 TABLET, DELAYED RELEASE ORAL at 06:21

## 2023-07-06 RX ADMIN — TAMSULOSIN HYDROCHLORIDE 0.4 MILLIGRAM(S): 0.4 CAPSULE ORAL at 21:32

## 2023-07-06 RX ADMIN — IRON SUCROSE 110 MILLIGRAM(S): 20 INJECTION, SOLUTION INTRAVENOUS at 13:04

## 2023-07-06 RX ADMIN — Medication 4 UNIT(S): at 22:27

## 2023-07-06 RX ADMIN — Medication 81 MILLIGRAM(S): at 13:04

## 2023-07-06 RX ADMIN — Medication 25 MILLIGRAM(S): at 13:04

## 2023-07-06 RX ADMIN — Medication 25 MILLIGRAM(S): at 21:32

## 2023-07-06 RX ADMIN — Medication 25 MILLIGRAM(S): at 06:21

## 2023-07-06 RX ADMIN — Medication 2.5 MILLIGRAM(S): at 06:21

## 2023-07-06 NOTE — PROGRESS NOTE ADULT - ASSESSMENT
84 yo M PMHx HTN, DM II, DLD, GERD, prostate cancer (sp seed implant in 2008), paroxysmal Afib, Hx of  SVT presented to the ED for evaluation of DEJESUS found to be covid positive. To note pt was discharged on 7/1 after being treated for SVT.    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 No administration of heparin this admission, HIT/delayed HIT unlikely. Check LE duplex, monitor CBC     #R basial nodular density   - keep OP pulm appointment    #DM type II   Hold oral meds;  check fs;  Start insulin  regimen if  serum Glucose >180, start insulin  protocol and adjust insulin  Lantus/Lispro,  Hold for Hypoglycemia Goal  Goal 140-180     #Dyslipidemia   - c/w statin     #CKD IIIa  - stable, trend bmp    #prostate cancer s/p RT/brachytherapy   - c/w home Flomax

## 2023-07-06 NOTE — DISCHARGE NOTE NURSING/CASE MANAGEMENT/SOCIAL WORK - PATIENT PORTAL LINK FT
You can access the FollowMyHealth Patient Portal offered by [facility] by registering at the following website: [email or web address]. By joining [employer]’s FollowMyHealth portal, you will also be able to view your health information using other applications (apps) compatible with our system.

## 2023-07-06 NOTE — PROGRESS NOTE ADULT - SUBJECTIVE AND OBJECTIVE BOX
DIANE GUERRERO  83y Male    CHIEF COMPLAINT:    Patient is a 83y old  Male who presents with a chief complaint of Mild AHRF in setting of COVID 19 PNA (05 Jul 2023 12:04)      INTERVAL HPI/OVERNIGHT EVENTS:    Patient seen and examined. Having some dry cough. No fever. No sob. On RA. Denies any bleeding    ROS: All other systems are negative.    Vital Signs:    T(F): 97.1 (07-06-23 @ 13:15), Max: 97.9 (07-05-23 @ 21:14)  HR: 59 (07-06-23 @ 13:15) (59 - 69)  BP: 123/66 (07-06-23 @ 13:15) (122/91 - 145/72)  RR: 18 (07-06-23 @ 13:15) (18 - 18)  SpO2: 99% (07-06-23 @ 04:36) (99% - 99%)  I&O's Summary    05 Jul 2023 07:01  -  06 Jul 2023 07:00  --------------------------------------------------------  IN: 250 mL / OUT: 250 mL / NET: 0 mL    06 Jul 2023 07:01  -  06 Jul 2023 14:45  --------------------------------------------------------  IN: 480 mL / OUT: 0 mL / NET: 480 mL      Daily     Daily   CAPILLARY BLOOD GLUCOSE      POCT Blood Glucose.: 197 mg/dL (06 Jul 2023 11:40)  POCT Blood Glucose.: 173 mg/dL (06 Jul 2023 08:07)  POCT Blood Glucose.: 156 mg/dL (05 Jul 2023 21:14)  POCT Blood Glucose.: 201 mg/dL (05 Jul 2023 16:34)      PHYSICAL EXAM:    GENERAL:  NAD  SKIN: No rashes or lesions  HENT: Atraumatic. Normocephalic. PERRL. Moist membranes.  NECK: Supple, No JVD. No lymphadenopathy.  PULMONARY: CTA B/L. No wheezing. No rales  CVS: Normal S1, S2. Rate and Rhythm are regular. No murmurs.  ABDOMEN/GI: Soft, Nontender, Nondistended; BS present  EXTREMITIES: Peripheral pulses intact. No edema B/L LE.  NEUROLOGIC:  No motor or sensory deficit.  PSYCH: Alert & oriented x 3    Consultant(s) Notes Reviewed:  [x ] YES  [ ] NO  Care Discussed with Consultants/Other Providers [ x] YES  [ ] NO    EKG reviewed  Telemetry reviewed    LABS:                        11.9   3.98  )-----------( 70       ( 06 Jul 2023 04:30 )             34.6   Hemoglobin: 11.9 g/dL (07-06 @ 04:30)  Hemoglobin: 11.3 g/dL (07-05 @ 08:42)  Hemoglobin: 11.6 g/dL (07-04 @ 07:42)  Hemoglobin: 11.5 g/dL (07-03 @ 06:37)  Hemoglobin: 13.5 g/dL (07-02 @ 10:09)    07-06    135  |  104  |  14  ----------------------------<  204<H>  4.5   |  17  |  0.9    Ca    8.0<L>      06 Jul 2023 04:30  Mg     1.9     07-06              Culture - Blood (collected 03 Jul 2023 19:20)  Source: .Blood None  Preliminary Report (06 Jul 2023 01:02):    No growth at 48 Hours        RADIOLOGY & ADDITIONAL TESTS:      Imaging or report Personally Reviewed:  [ ] YES  [ ] NO    Medications:  Standing  aspirin  chewable 81 milliGRAM(s) Oral daily  atorvastatin 40 milliGRAM(s) Oral at bedtime  dextrose 5%. 1000 milliLiter(s) IV Continuous <Continuous>  dextrose 50% Injectable 25 Gram(s) IV Push once  enalapril 2.5 milliGRAM(s) Oral daily  glucagon  Injectable 1 milliGRAM(s) IntraMuscular once  insulin lispro (ADMELOG) corrective regimen sliding scale   SubCutaneous three times a day before meals  metoprolol tartrate 25 milliGRAM(s) Oral every 8 hours  pantoprazole    Tablet 40 milliGRAM(s) Oral before breakfast  tamsulosin 0.4 milliGRAM(s) Oral at bedtime    PRN Meds  acetaminophen     Tablet .. 650 milliGRAM(s) Oral every 6 hours PRN  aluminum hydroxide/magnesium hydroxide/simethicone Suspension 30 milliLiter(s) Oral every 4 hours PRN  dextrose Oral Gel 15 Gram(s) Oral once PRN  melatonin 3 milliGRAM(s) Oral at bedtime PRN  ondansetron Injectable 4 milliGRAM(s) IV Push every 8 hours PRN      Case discussed with resident    Care discussed with pt/family

## 2023-07-06 NOTE — PROGRESS NOTE ADULT - ASSESSMENT
83-year-old man with PMHx of HTN, DM, DLD, GERD, prostate cancer (sp seed implant in 2008), pAfib, Hx of  SVT follows with Dr. Motta presenting to the ED for evaluation of acute dyspnea on exertion that began this morning.    Sepsis on admission T>101 P>90 due to COVID19, non-severe, not requiring supplemental O2   SVT  DM-2 / HTN / DL  H/O Prostate Ca.   Thrombocytopenia  PAF              PLAN:    ·	Pt is not requiring any O2. On RA. Was offered Remdesivir by the ID but refused it.  ·	Blood cx is negative  ·	CTA chest noted. Poor study. No evidence of central large PE.   ·	Recent ECHO showed EF is 58%  ·	D-dimer is 245 and procalcitonin is 0.07  ·	Percent sat is 12 and serum iron is 29  ·	Check stool guaiac and monitor H/H  ·	Start him on Venofer 200 mg iv daily x 5 doses  ·	Platelets count keeps trending down. 70 today. Pt has h/o ITP. Covid-19 infection can also cause ITP  ·	Hem/Onc eval noted. Cont to monitor platelet count. May need treatment.  ·	Remote H/O PAF. Not on A/C  ·	EP eval noted. Recommended MCOT on d/c  ·	Monitor FS. On Insulin sliding scale.     Progress Note Handoff    Pending (specify):  Consults_________, Tests________, Test Results_______, Other__MCOT on d/c. Monitoring platelets count_______  Family discussion:  Disposition: Home___/SNF___/Other________/Unknown at this time________    Mickey Avalos MD  Spectra: [id number]

## 2023-07-06 NOTE — DISCHARGE NOTE NURSING/CASE MANAGEMENT/SOCIAL WORK - NSDCFUADDAPPT_GEN_ALL_CORE_FT
appt with Dr Jeronimo at [facility]  [address] [facility] [place]  [phone]  8/29/23 10:45 am

## 2023-07-07 VITALS — HEART RATE: 56 BPM | SYSTOLIC BLOOD PRESSURE: 137 MMHG | DIASTOLIC BLOOD PRESSURE: 64 MMHG

## 2023-07-07 LAB
ANION GAP SERPL CALC-SCNC: 10 MMOL/L — SIGNIFICANT CHANGE UP (ref 7–14)
BUN SERPL-MCNC: 15 MG/DL — SIGNIFICANT CHANGE UP (ref 10–20)
CALCIUM SERPL-MCNC: 8.6 MG/DL — SIGNIFICANT CHANGE UP (ref 8.4–10.5)
CHLORIDE SERPL-SCNC: 106 MMOL/L — SIGNIFICANT CHANGE UP (ref 98–110)
CO2 SERPL-SCNC: 23 MMOL/L — SIGNIFICANT CHANGE UP (ref 17–32)
CREAT SERPL-MCNC: 0.9 MG/DL — SIGNIFICANT CHANGE UP (ref 0.7–1.5)
CULTURE RESULTS: SIGNIFICANT CHANGE UP
CULTURE RESULTS: SIGNIFICANT CHANGE UP
EGFR: 85 ML/MIN/1.73M2 — SIGNIFICANT CHANGE UP
GLUCOSE BLDC GLUCOMTR-MCNC: 187 MG/DL — HIGH (ref 70–99)
GLUCOSE BLDC GLUCOMTR-MCNC: 191 MG/DL — HIGH (ref 70–99)
GLUCOSE BLDC GLUCOMTR-MCNC: 264 MG/DL — HIGH (ref 70–99)
GLUCOSE SERPL-MCNC: 191 MG/DL — HIGH (ref 70–99)
HCT VFR BLD CALC: 35.9 % — LOW (ref 42–52)
HGB BLD-MCNC: 12.4 G/DL — LOW (ref 14–18)
MAGNESIUM SERPL-MCNC: 1.9 MG/DL — SIGNIFICANT CHANGE UP (ref 1.8–2.4)
MCHC RBC-ENTMCNC: 30.7 PG — SIGNIFICANT CHANGE UP (ref 27–31)
MCHC RBC-ENTMCNC: 34.5 G/DL — SIGNIFICANT CHANGE UP (ref 32–37)
MCV RBC AUTO: 88.9 FL — SIGNIFICANT CHANGE UP (ref 80–94)
NRBC # BLD: 0 /100 WBCS — SIGNIFICANT CHANGE UP (ref 0–0)
PLATELET # BLD AUTO: 73 K/UL — LOW (ref 130–400)
PMV BLD: 10.7 FL — HIGH (ref 7.4–10.4)
POTASSIUM SERPL-MCNC: 4.7 MMOL/L — SIGNIFICANT CHANGE UP (ref 3.5–5)
POTASSIUM SERPL-SCNC: 4.7 MMOL/L — SIGNIFICANT CHANGE UP (ref 3.5–5)
RBC # BLD: 4.04 M/UL — LOW (ref 4.7–6.1)
RBC # FLD: 13 % — SIGNIFICANT CHANGE UP (ref 11.5–14.5)
SODIUM SERPL-SCNC: 139 MMOL/L — SIGNIFICANT CHANGE UP (ref 135–146)
SPECIMEN SOURCE: SIGNIFICANT CHANGE UP
SPECIMEN SOURCE: SIGNIFICANT CHANGE UP
WBC # BLD: 5.05 K/UL — SIGNIFICANT CHANGE UP (ref 4.8–10.8)
WBC # FLD AUTO: 5.05 K/UL — SIGNIFICANT CHANGE UP (ref 4.8–10.8)

## 2023-07-07 PROCEDURE — 99239 HOSP IP/OBS DSCHRG MGMT >30: CPT

## 2023-07-07 RX ADMIN — Medication 81 MILLIGRAM(S): at 11:39

## 2023-07-07 RX ADMIN — IRON SUCROSE 110 MILLIGRAM(S): 20 INJECTION, SOLUTION INTRAVENOUS at 05:42

## 2023-07-07 RX ADMIN — Medication 2: at 08:21

## 2023-07-07 RX ADMIN — PANTOPRAZOLE SODIUM 40 MILLIGRAM(S): 20 TABLET, DELAYED RELEASE ORAL at 06:11

## 2023-07-07 RX ADMIN — Medication 25 MILLIGRAM(S): at 13:20

## 2023-07-07 RX ADMIN — Medication 2: at 16:23

## 2023-07-07 RX ADMIN — Medication 2.5 MILLIGRAM(S): at 05:29

## 2023-07-07 RX ADMIN — Medication 6: at 11:38

## 2023-07-07 RX ADMIN — Medication 25 MILLIGRAM(S): at 05:29

## 2023-07-07 NOTE — PROGRESS NOTE ADULT - ASSESSMENT
83-year-old man with PMHx of HTN, DM, DLD, GERD, prostate cancer (sp seed implant in 2008), pAfib, Hx of  SVT follows with Dr. Motta presenting to the ED for evaluation of acute dyspnea on exertion that began this morning.    Sepsis on admission T>101 P>90 due to COVID19, non-severe, not requiring supplemental O2   SVT  DM-2 / HTN / DL  H/O Prostate Ca.   Thrombocytopenia  PAF              PLAN:    ·	Pt is not requiring any O2. On RA. Was offered Remdesivir by the ID but refused it.  ·	Blood cx is negative  ·	CTA chest noted. Poor study. No evidence of central large PE.   ·	Recent ECHO showed EF is 58%  ·	D-dimer is 245 and procalcitonin is 0.07  ·	Percent sat is 12 and serum iron is 29  ·	Platelets count is 73 today. Now started trending up. D/W the Hem/Onc. Cleared the pt to d/c home and f/u as an out pt. Pt has h/o ITP. Covid-19 infection can also cause ITP  ·	Remote H/O PAF. Not on A/C  ·	EP recommended MCOT and out pt f/u. MCOT delivered to the pt.  ·	D/C home    * Med rec reviewed. Plan of care d/w the pt. Appt made with his cardiologist Dr. Coughlin on 7/21 at 2:00 PM. Time spent 38 minutes.

## 2023-07-07 NOTE — PROGRESS NOTE ADULT - PROVIDER SPECIALTY LIST ADULT
Internal Medicine
Hospitalist
Hospitalist
Internal Medicine
Internal Medicine
Hospitalist
Internal Medicine

## 2023-07-07 NOTE — PROGRESS NOTE ADULT - SUBJECTIVE AND OBJECTIVE BOX
24H events:    Patient is a 83y old Male who presents with a chief complaint of Mild AHRF in setting of COVID 19 PNA (05 Jul 2023 12:04)    Primary diagnosis of Fever       Today is hospital day 5d. This morning patient was seen and examined at bedside, resting comfortably in bed.      PAST MEDICAL & SURGICAL HISTORY  Diabetes mellitus    Hypertension    Paroxysmal atrial fibrillation    Prostate cancer    Status post cervical spinal fusion      SOCIAL HISTORY:  Social History:  Lives at home with family, no needs at baseline (02 Jul 2023 17:57)      ALLERGIES:  No Known Allergies    MEDICATIONS:  STANDING MEDICATIONS  aspirin  chewable 81 milliGRAM(s) Oral daily  atorvastatin 40 milliGRAM(s) Oral at bedtime  dextrose 5%. 1000 milliLiter(s) IV Continuous <Continuous>  dextrose 50% Injectable 25 Gram(s) IV Push once  enalapril 2.5 milliGRAM(s) Oral daily  glucagon  Injectable 1 milliGRAM(s) IntraMuscular once  insulin lispro (ADMELOG) corrective regimen sliding scale   SubCutaneous three times a day before meals  iron sucrose IVPB 200 milliGRAM(s) IV Intermittent every 24 hours  metoprolol tartrate 25 milliGRAM(s) Oral every 8 hours  pantoprazole    Tablet 40 milliGRAM(s) Oral before breakfast  tamsulosin 0.4 milliGRAM(s) Oral at bedtime    PRN MEDICATIONS  acetaminophen     Tablet .. 650 milliGRAM(s) Oral every 6 hours PRN  aluminum hydroxide/magnesium hydroxide/simethicone Suspension 30 milliLiter(s) Oral every 4 hours PRN  dextrose Oral Gel 15 Gram(s) Oral once PRN  melatonin 3 milliGRAM(s) Oral at bedtime PRN  ondansetron Injectable 4 milliGRAM(s) IV Push every 8 hours PRN    VITALS:   T(F): 98  HR: 65  BP: 140/68  RR: 18  SpO2: 96%    PHYSICAL EXAM:  GENERAL: NAD, well-groomed, well-developed  HEAD:  Atraumatic, Normocephalic  EYES: EOMI  NECK: Supple  NERVOUS SYSTEM:  Alert & Oriented X3, non focal   CHEST/LUNG: Clear to auscultation bilaterally; No rales, rhonchi, wheezing, or rubs  HEART: Regular rate and rhythm; No murmurs, rubs, or gallops  ABDOMEN: Soft, Nontender, Nondistended; Bowel sounds present  EXTREMITIES:  2+ Peripheral Pulses, No clubbing, cyanosis, or edema  LYMPH: No lymphadenopathy noted  SKIN: No rashes or lesions  LABS:                        11.9   3.98  )-----------( 70       ( 06 Jul 2023 04:30 )             34.6     07-07    139  |  106  |  15  ----------------------------<  191<H>  4.7   |  23  |  0.9    Ca    8.6      07 Jul 2023 06:51  Mg     1.9     07-07        Urinalysis Basic - ( 07 Jul 2023 06:51 )    Color: x / Appearance: x / SG: x / pH: x  Gluc: 191 mg/dL / Ketone: x  / Bili: x / Urobili: x   Blood: x / Protein: x / Nitrite: x   Leuk Esterase: x / RBC: x / WBC x   Sq Epi: x / Non Sq Epi: x / Bacteria: x                RADIOLOGY:

## 2023-07-08 DIAGNOSIS — Z79.84 LONG TERM (CURRENT) USE OF ORAL HYPOGLYCEMIC DRUGS: ICD-10-CM

## 2023-07-08 DIAGNOSIS — Z79.82 LONG TERM (CURRENT) USE OF ASPIRIN: ICD-10-CM

## 2023-07-08 DIAGNOSIS — I45.10 UNSPECIFIED RIGHT BUNDLE-BRANCH BLOCK: ICD-10-CM

## 2023-07-08 DIAGNOSIS — I48.0 PAROXYSMAL ATRIAL FIBRILLATION: ICD-10-CM

## 2023-07-08 DIAGNOSIS — Z85.46 PERSONAL HISTORY OF MALIGNANT NEOPLASM OF PROSTATE: ICD-10-CM

## 2023-07-08 DIAGNOSIS — I44.0 ATRIOVENTRICULAR BLOCK, FIRST DEGREE: ICD-10-CM

## 2023-07-08 DIAGNOSIS — I47.1 SUPRAVENTRICULAR TACHYCARDIA: ICD-10-CM

## 2023-07-08 DIAGNOSIS — K21.9 GASTRO-ESOPHAGEAL REFLUX DISEASE WITHOUT ESOPHAGITIS: ICD-10-CM

## 2023-07-08 DIAGNOSIS — E11.9 TYPE 2 DIABETES MELLITUS WITHOUT COMPLICATIONS: ICD-10-CM

## 2023-07-08 DIAGNOSIS — I10 ESSENTIAL (PRIMARY) HYPERTENSION: ICD-10-CM

## 2023-07-08 DIAGNOSIS — Z87.891 PERSONAL HISTORY OF NICOTINE DEPENDENCE: ICD-10-CM

## 2023-07-08 DIAGNOSIS — I24.8 OTHER FORMS OF ACUTE ISCHEMIC HEART DISEASE: ICD-10-CM

## 2023-07-09 LAB
CULTURE RESULTS: SIGNIFICANT CHANGE UP
SPECIMEN SOURCE: SIGNIFICANT CHANGE UP

## 2023-07-13 DIAGNOSIS — D69.6 THROMBOCYTOPENIA, UNSPECIFIED: ICD-10-CM

## 2023-07-13 DIAGNOSIS — Z79.82 LONG TERM (CURRENT) USE OF ASPIRIN: ICD-10-CM

## 2023-07-13 DIAGNOSIS — E78.5 HYPERLIPIDEMIA, UNSPECIFIED: ICD-10-CM

## 2023-07-13 DIAGNOSIS — I12.9 HYPERTENSIVE CHRONIC KIDNEY DISEASE WITH STAGE 1 THROUGH STAGE 4 CHRONIC KIDNEY DISEASE, OR UNSPECIFIED CHRONIC KIDNEY DISEASE: ICD-10-CM

## 2023-07-13 DIAGNOSIS — A41.9 SEPSIS, UNSPECIFIED ORGANISM: ICD-10-CM

## 2023-07-13 DIAGNOSIS — Z87.891 PERSONAL HISTORY OF NICOTINE DEPENDENCE: ICD-10-CM

## 2023-07-13 DIAGNOSIS — K21.9 GASTRO-ESOPHAGEAL REFLUX DISEASE WITHOUT ESOPHAGITIS: ICD-10-CM

## 2023-07-13 DIAGNOSIS — N18.31 CHRONIC KIDNEY DISEASE, STAGE 3A: ICD-10-CM

## 2023-07-13 DIAGNOSIS — Z85.46 PERSONAL HISTORY OF MALIGNANT NEOPLASM OF PROSTATE: ICD-10-CM

## 2023-07-13 DIAGNOSIS — U07.1 COVID-19: ICD-10-CM

## 2023-07-13 DIAGNOSIS — I48.0 PAROXYSMAL ATRIAL FIBRILLATION: ICD-10-CM

## 2023-07-13 DIAGNOSIS — Z79.84 LONG TERM (CURRENT) USE OF ORAL HYPOGLYCEMIC DRUGS: ICD-10-CM

## 2023-07-13 DIAGNOSIS — E11.22 TYPE 2 DIABETES MELLITUS WITH DIABETIC CHRONIC KIDNEY DISEASE: ICD-10-CM

## 2023-07-13 DIAGNOSIS — Z79.83 LONG TERM (CURRENT) USE OF BISPHOSPHONATES: ICD-10-CM

## 2023-07-13 DIAGNOSIS — D64.9 ANEMIA, UNSPECIFIED: ICD-10-CM

## 2023-07-19 ENCOUNTER — APPOINTMENT (OUTPATIENT)
Dept: HEMATOLOGY ONCOLOGY | Facility: CLINIC | Age: 83
End: 2023-07-19
Payer: MEDICARE

## 2023-07-19 ENCOUNTER — OUTPATIENT (OUTPATIENT)
Dept: OUTPATIENT SERVICES | Facility: HOSPITAL | Age: 83
LOS: 1 days | End: 2023-07-19
Payer: MEDICARE

## 2023-07-19 ENCOUNTER — LABORATORY RESULT (OUTPATIENT)
Age: 83
End: 2023-07-19

## 2023-07-19 VITALS
WEIGHT: 202 LBS | TEMPERATURE: 97.7 F | BODY MASS INDEX: 26.77 KG/M2 | RESPIRATION RATE: 18 BRPM | DIASTOLIC BLOOD PRESSURE: 73 MMHG | HEIGHT: 73 IN | HEART RATE: 73 BPM | SYSTOLIC BLOOD PRESSURE: 162 MMHG

## 2023-07-19 DIAGNOSIS — E11.9 TYPE 2 DIABETES MELLITUS W/OUT COMPLICATIONS: ICD-10-CM

## 2023-07-19 DIAGNOSIS — M53.9 DORSOPATHY, UNSPECIFIED: ICD-10-CM

## 2023-07-19 DIAGNOSIS — Z85.46 PERSONAL HISTORY OF MALIGNANT NEOPLASM OF PROSTATE: ICD-10-CM

## 2023-07-19 DIAGNOSIS — Z80.0 FAMILY HISTORY OF MALIGNANT NEOPLASM OF DIGESTIVE ORGANS: ICD-10-CM

## 2023-07-19 DIAGNOSIS — M40.50 LORDOSIS, UNSPECIFIED, SITE UNSPECIFIED: ICD-10-CM

## 2023-07-19 DIAGNOSIS — E78.00 PURE HYPERCHOLESTEROLEMIA, UNSPECIFIED: ICD-10-CM

## 2023-07-19 DIAGNOSIS — Z84.89 FAMILY HISTORY OF OTHER SPECIFIED CONDITIONS: ICD-10-CM

## 2023-07-19 DIAGNOSIS — N28.1 CYST OF KIDNEY, ACQUIRED: ICD-10-CM

## 2023-07-19 DIAGNOSIS — D69.6 THROMBOCYTOPENIA, UNSPECIFIED: ICD-10-CM

## 2023-07-19 DIAGNOSIS — Z98.1 ARTHRODESIS STATUS: Chronic | ICD-10-CM

## 2023-07-19 DIAGNOSIS — M41.9 SCOLIOSIS, UNSPECIFIED: ICD-10-CM

## 2023-07-19 PROCEDURE — 99213 OFFICE O/P EST LOW 20 MIN: CPT

## 2023-07-19 PROCEDURE — 85027 COMPLETE CBC AUTOMATED: CPT

## 2023-07-19 NOTE — ASSESSMENT
[FreeTextEntry1] : Thrombocytopenia in the setting of acute Covid infection and history of ITP more than 20 years ago.\par The platelet count seems to be recovering, now 123 K, without any treatment for the thrombocytopenia suggesting that the drop of the platelets was most likely related to the Covid infection.\par However, the patient states that a week ago the count had gone up already to 150 K when his primary care physician checked it. If that information is correct and no laboratory error, the patient should continue to be observed to make sure that he is not relapsing.\par On the other hand, the H/H seems to be recovering nicely with the counts up to 13/37.2%. \par \par The situation was discussed with the patient.\par \par All questions answered.\par \par Will monitor closely and repeat the CBC in 2 weeks and if any unexplained bleeding or bruising.

## 2023-07-19 NOTE — HISTORY OF PRESENT ILLNESS
[Disease:__________________________] : Disease: [unfilled] [de-identified] : The patient is coming for a follow up for his thrombocytopenia which was assumed to be from ITP since he was diagnosed with that about 28-30 years ago. He did not respond to steroids and IVIg but did respond to Rituxan at that time.\par He was recently admitted to the hospital because of tachyarrhythmia which required electroshock and he was taken to the ER. Medications were also given. In the hospital he was diagnosed also with Covid infection. \par Eventually, he recovered gradually. While in the hospital, the platelets dropped from above 130 K on admission to 60-70 K range but he was discharged with a count of 73 K with instructions to follow up with us as outpatient.\par No bleeding or bruising. \par He feels relatively fine just tired occasionally.

## 2023-07-19 NOTE — PHYSICAL EXAM
[Restricted in physically strenuous activity but ambulatory and able to carry out work of a light or sedentary nature] : Status 1- Restricted in physically strenuous activity but ambulatory and able to carry out work of a light or sedentary nature, e.g., light house work, office work [Normal] : affect appropriate [de-identified] : May be slightly overweight [de-identified] : Soft, midline abdominal wall hernia [de-identified] : Some arthritic changes

## 2023-07-20 DIAGNOSIS — D69.6 THROMBOCYTOPENIA, UNSPECIFIED: ICD-10-CM

## 2023-07-20 LAB
HCT VFR BLD CALC: 37.2 %
HGB BLD-MCNC: 13 G/DL
MCHC RBC-ENTMCNC: 30.4 PG
MCHC RBC-ENTMCNC: 34.9 G/DL
MCV RBC AUTO: 87.1 FL
PLATELET # BLD AUTO: 123 K/UL
PMV BLD: 13.2 FL
RBC # BLD: 4.27 M/UL
RBC # FLD: 13.2 %
WBC # FLD AUTO: 6.26 K/UL

## 2023-08-02 ENCOUNTER — OUTPATIENT (OUTPATIENT)
Dept: OUTPATIENT SERVICES | Facility: HOSPITAL | Age: 83
LOS: 1 days | End: 2023-08-02
Payer: MEDICARE

## 2023-08-02 ENCOUNTER — APPOINTMENT (OUTPATIENT)
Dept: HEMATOLOGY ONCOLOGY | Facility: CLINIC | Age: 83
End: 2023-08-02
Payer: MEDICARE

## 2023-08-02 ENCOUNTER — LABORATORY RESULT (OUTPATIENT)
Age: 83
End: 2023-08-02

## 2023-08-02 DIAGNOSIS — Z98.1 ARTHRODESIS STATUS: Chronic | ICD-10-CM

## 2023-08-02 DIAGNOSIS — D69.6 THROMBOCYTOPENIA, UNSPECIFIED: ICD-10-CM

## 2023-08-02 LAB
HCT VFR BLD CALC: 36.1 %
HGB BLD-MCNC: 12.5 G/DL
MCHC RBC-ENTMCNC: 30.4 PG
MCHC RBC-ENTMCNC: 34.6 G/DL
MCV RBC AUTO: 87.8 FL
PLATELET # BLD AUTO: 83 K/UL
PMV BLD: 12.3 FL
RBC # BLD: 4.11 M/UL
RBC # FLD: 13.6 %
WBC # FLD AUTO: 8.25 K/UL

## 2023-08-02 PROCEDURE — 85027 COMPLETE CBC AUTOMATED: CPT

## 2023-08-02 PROCEDURE — 36416 COLLJ CAPILLARY BLOOD SPEC: CPT

## 2023-08-02 PROCEDURE — 99213 OFFICE O/P EST LOW 20 MIN: CPT

## 2023-08-03 DIAGNOSIS — D69.6 THROMBOCYTOPENIA, UNSPECIFIED: ICD-10-CM

## 2023-08-09 ENCOUNTER — OUTPATIENT (OUTPATIENT)
Dept: OUTPATIENT SERVICES | Facility: HOSPITAL | Age: 83
LOS: 1 days | End: 2023-08-09
Payer: MEDICARE

## 2023-08-09 ENCOUNTER — APPOINTMENT (OUTPATIENT)
Dept: HEMATOLOGY ONCOLOGY | Facility: CLINIC | Age: 83
End: 2023-08-09

## 2023-08-09 ENCOUNTER — APPOINTMENT (OUTPATIENT)
Dept: ELECTROPHYSIOLOGY | Facility: CLINIC | Age: 83
End: 2023-08-09
Payer: MEDICARE

## 2023-08-09 ENCOUNTER — LABORATORY RESULT (OUTPATIENT)
Age: 83
End: 2023-08-09

## 2023-08-09 VITALS
WEIGHT: 199 LBS | BODY MASS INDEX: 26.37 KG/M2 | HEIGHT: 73 IN | DIASTOLIC BLOOD PRESSURE: 80 MMHG | HEART RATE: 63 BPM | TEMPERATURE: 98 F | SYSTOLIC BLOOD PRESSURE: 130 MMHG

## 2023-08-09 DIAGNOSIS — D69.6 THROMBOCYTOPENIA, UNSPECIFIED: ICD-10-CM

## 2023-08-09 DIAGNOSIS — Z98.1 ARTHRODESIS STATUS: Chronic | ICD-10-CM

## 2023-08-09 LAB
HCT VFR BLD CALC: 37.4 %
HGB BLD-MCNC: 13.2 G/DL
MCHC RBC-ENTMCNC: 30.9 PG
MCHC RBC-ENTMCNC: 35.3 G/DL
MCV RBC AUTO: 87.6 FL
PLATELET # BLD AUTO: 159 K/UL
PMV BLD: 11.7 FL
RBC # BLD: 4.27 M/UL
RBC # FLD: 13.3 %
WBC # FLD AUTO: 6.59 K/UL

## 2023-08-09 PROCEDURE — 93000 ELECTROCARDIOGRAM COMPLETE: CPT | Mod: 59

## 2023-08-09 PROCEDURE — 85027 COMPLETE CBC AUTOMATED: CPT

## 2023-08-09 PROCEDURE — 36416 COLLJ CAPILLARY BLOOD SPEC: CPT

## 2023-08-09 PROCEDURE — 99215 OFFICE O/P EST HI 40 MIN: CPT

## 2023-08-09 PROCEDURE — 93228 REMOTE 30 DAY ECG REV/REPORT: CPT

## 2023-08-09 NOTE — HISTORY OF PRESENT ILLNESS
[FreeTextEntry1] :  Cardio: Dr. Bobby Parker   84 yo M with PMHx of HTN, DM, DLD, GERD, prostate cancer (status post seed implant in 2008), c-spine fusion, paroxysmal atrial fibrillation not on anticoagulation (unknown reason) follows with Dr. Haider for cardiology, primary care physician Veto Prather MD  presented to the ED for evaluation of acute onset shortness of breath that began this morning for which EMS was called. During transportation with EMT noted the patient to have tachycardia on ekg for which he was given 6 and 12 adenosine which did not convert his rhythm but he became hypotensive and was cardioverted with 100J and 100 ketamine and converted to sinus rhythm. Electrophysiology was consulted for further recommendations. The patient was seen and evaluated. There is no echocardiogram and no recent ischemic work up. He states to have been diagnosed with atrial fibrillation back in 1994 however was never initiated on anticoagulation and nothing was mentioned further about it on follow ups according to the patient. 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 There is no documentation of the event that took place by EMS so we are unable to determine what arrythmia the patient had at the time.   Plan:  1. Anticoagulation: CHADS-VASC score of 4 should be initiated on anticoagulation if no contraindications exist 2. Rate control: On Metoprolol which can be titrated as tolerated 3: Please obtain an echocardiogram 4: Consider an ischemic work up  5: As there is no documented evidence of the arrythmia occurrence would suggest an internal loop recorder implant prior to discharge.     Attestation Statements:  Attestation Statements: Attending and PA/NP shared services statement (NON-critical care):   Attending to bill.   I attest my time as attending is greater than 50% of the total combined time spent on qualifying patient care activities by the PA/NP and attending.   I have made amendments to the documentation where necessary. Additional comments: H/o AFib (felt irregular HR in 1994). No recurrence.   SVT with HR up to 205 bpm per pt and son requiring cardioversion.  Pt with associated sig dyspnea.   Rec - 2D echo - Check TFTs - Monitor on tele. Obtain strips from ED or EMS if possible (no strips in chart available for review).  - Ischemic work up per cardio - Loop if normal EF.   Time-based billing (NON-critical care).   75 minutes spent on total encounter. The necessity of the time spent during the encounter on this date of service was due to:   tachyarrhythmia.

## 2023-08-09 NOTE — CARDIOLOGY SUMMARY
[de-identified] : 8/9/2023 NSR (HR 63 bpm), 1st AVB ( msec), RBBB [de-identified] : 7/15-8/1/2023 17 days AVG HR 65 bpm Two episodes of SVT  < 1% PACs. <1 % PVCs 35 symptom episodes c/w NSR (sx listed as taking meds) [de-identified] : 7/1/2023  1. NORMAL LEXISCAN / REST MYOCARDIAL PERFUSION TOMOGRAPHY, WITH NO EVIDENCE FOR ISCHEMIA DURING LEXISCAN INFUSION. 2. NORMAL RESTING LEFT VENTRICULAR WALL MOTION AND WALL THICKENING. 3. LEFT VENTRICULAR EJECTION FRACTION OF  58 % WHICH IS WITHIN RANGE OF NORMAL. [de-identified] : 7/20/2023  1. LV Ejection Fraction by Nunes's Method with a biplane EF of 58 %.  2. Normal global left ventricular systolic function.  3. Normal left atrial size.  4. Normal right atrial size.

## 2023-08-09 NOTE — DISCUSSION/SUMMARY
[FreeTextEntry1] : I explained the procedure in detail including benefits/risks/alternative. Risk involved is less than 1% of bleeding and infection. We also discussed remote monitoring in detail. All questions were answered and the patient would like to DEFER loop implant.  [EKG obtained to assist in diagnosis and management of assessed problem(s)] : EKG obtained to assist in diagnosis and management of assessed problem(s)

## 2023-08-16 NOTE — CONSULT NOTE ADULT - ASSESSMENT
----- Message from Cuauhtemoc Kwon MD sent at 8/16/2023  8:08 AM EDT -----  Labs look good  ----- Message -----  From: Lab, Background User  Sent: 8/16/2023  12:57 AM EDT  To: Cuauhtemoc Kwon MD       PMD Veto Prather MD  Cards Dr. Haider  EP Marium Joaquin    83y Male with h/o HTN, DM, DLD, GERD, prostate cancer (status post seed implant in 2008), c-spine fusion, remote h/o paroxysmal atrial fibrillation '94, not on anticoagulation, admitted with dyspnea and palpitations, reported tachycardia treated with adenosine by EMS, was planned for ILR as out-patient  Readmitted with same complaints, was found to COVID+ infection    palpitations  ?SVT  COVID infection  remote h/o AF  HTN, HLD, DM    con't current management  con't tele  will plan ILR at the later date  MCOT prior to discharge  please notify EP ACP of discharge date

## 2023-09-06 ENCOUNTER — LABORATORY RESULT (OUTPATIENT)
Age: 83
End: 2023-09-06

## 2023-09-06 ENCOUNTER — OUTPATIENT (OUTPATIENT)
Dept: OUTPATIENT SERVICES | Facility: HOSPITAL | Age: 83
LOS: 1 days | End: 2023-09-06
Payer: MEDICARE

## 2023-09-06 ENCOUNTER — APPOINTMENT (OUTPATIENT)
Dept: HEMATOLOGY ONCOLOGY | Facility: CLINIC | Age: 83
End: 2023-09-06
Payer: MEDICARE

## 2023-09-06 DIAGNOSIS — D69.6 THROMBOCYTOPENIA, UNSPECIFIED: ICD-10-CM

## 2023-09-06 DIAGNOSIS — Z98.1 ARTHRODESIS STATUS: Chronic | ICD-10-CM

## 2023-09-06 PROCEDURE — 99213 OFFICE O/P EST LOW 20 MIN: CPT

## 2023-09-06 PROCEDURE — 85027 COMPLETE CBC AUTOMATED: CPT

## 2023-09-07 DIAGNOSIS — D69.6 THROMBOCYTOPENIA, UNSPECIFIED: ICD-10-CM

## 2023-09-11 LAB
HCT VFR BLD CALC: 36.2 %
HGB BLD-MCNC: 12.8 G/DL
MCHC RBC-ENTMCNC: 31.1 PG
MCHC RBC-ENTMCNC: 35.4 G/DL
MCV RBC AUTO: 87.9 FL
PLATELET # BLD AUTO: 143 K/UL
PMV BLD: 11.4 FL
RBC # BLD: 4.12 M/UL
RBC # FLD: 13.2 %
WBC # FLD AUTO: 5.79 K/UL

## 2023-09-13 ENCOUNTER — NON-APPOINTMENT (OUTPATIENT)
Age: 83
End: 2023-09-13

## 2023-09-30 RX ORDER — NITROFURANTOIN (MONOHYDRATE/MACROCRYSTALS) 25; 75 MG/1; MG/1
100 CAPSULE ORAL DAILY
Refills: 0 | Status: ACTIVE | COMMUNITY

## 2023-09-30 RX ORDER — OMEGA-3/DHA/EPA/FISH OIL/COQ10 1900MG/2.5
EMULSION IN PACKET ORAL
Refills: 0 | Status: ACTIVE | COMMUNITY

## 2023-09-30 RX ORDER — ENALAPRIL MALEATE 2.5 MG/1
2.5 TABLET ORAL DAILY
Refills: 0 | Status: ACTIVE | COMMUNITY

## 2023-09-30 RX ORDER — OMEPRAZOLE 20 MG/1
20 CAPSULE, DELAYED RELEASE ORAL DAILY
Refills: 0 | Status: ACTIVE | COMMUNITY

## 2023-09-30 RX ORDER — GLIMEPIRIDE 1 MG/1
1 TABLET ORAL DAILY
Refills: 0 | Status: ACTIVE | COMMUNITY

## 2023-09-30 RX ORDER — METOPROLOL TARTRATE 25 MG/1
25 TABLET, FILM COATED ORAL TWICE DAILY
Qty: 180 | Refills: 3 | Status: ACTIVE | COMMUNITY

## 2023-09-30 RX ORDER — OMEGA-3/DHA/EPA/FISH OIL 300-1000MG
1000 CAPSULE ORAL
Refills: 0 | Status: ACTIVE | COMMUNITY

## 2023-09-30 RX ORDER — METFORMIN HYDROCHLORIDE 1000 MG/1
1000 TABLET, COATED ORAL
Qty: 180 | Refills: 3 | Status: ACTIVE | COMMUNITY

## 2023-09-30 RX ORDER — TAMSULOSIN HYDROCHLORIDE 0.4 MG/1
0.4 CAPSULE ORAL TWICE DAILY
Refills: 0 | Status: ACTIVE | COMMUNITY

## 2023-10-16 ENCOUNTER — LABORATORY RESULT (OUTPATIENT)
Age: 83
End: 2023-10-16

## 2023-10-16 ENCOUNTER — APPOINTMENT (OUTPATIENT)
Dept: HEMATOLOGY ONCOLOGY | Facility: CLINIC | Age: 83
End: 2023-10-16
Payer: MEDICARE

## 2023-10-16 ENCOUNTER — OUTPATIENT (OUTPATIENT)
Dept: OUTPATIENT SERVICES | Facility: HOSPITAL | Age: 83
LOS: 1 days | End: 2023-10-16
Payer: MEDICARE

## 2023-10-16 VITALS
RESPIRATION RATE: 16 BRPM | TEMPERATURE: 98.7 F | DIASTOLIC BLOOD PRESSURE: 80 MMHG | SYSTOLIC BLOOD PRESSURE: 131 MMHG | HEIGHT: 73 IN | WEIGHT: 202 LBS | HEART RATE: 84 BPM | BODY MASS INDEX: 26.77 KG/M2

## 2023-10-16 DIAGNOSIS — Z98.1 ARTHRODESIS STATUS: Chronic | ICD-10-CM

## 2023-10-16 DIAGNOSIS — D69.6 THROMBOCYTOPENIA, UNSPECIFIED: ICD-10-CM

## 2023-10-16 LAB
HCT VFR BLD CALC: 38.3 %
HGB BLD-MCNC: 13.3 G/DL
MCHC RBC-ENTMCNC: 30.6 PG
MCHC RBC-ENTMCNC: 34.7 G/DL
MCV RBC AUTO: 88.2 FL
PLATELET # BLD AUTO: 86 K/UL
PMV BLD: 12.6 FL
RBC # BLD: 4.34 M/UL
RBC # FLD: 13.4 %
WBC # FLD AUTO: 6.82 K/UL

## 2023-10-16 PROCEDURE — 99213 OFFICE O/P EST LOW 20 MIN: CPT

## 2023-10-16 PROCEDURE — 85027 COMPLETE CBC AUTOMATED: CPT

## 2023-10-17 DIAGNOSIS — D69.6 THROMBOCYTOPENIA, UNSPECIFIED: ICD-10-CM

## 2023-11-01 ENCOUNTER — APPOINTMENT (OUTPATIENT)
Dept: HEMATOLOGY ONCOLOGY | Facility: CLINIC | Age: 83
End: 2023-11-01

## 2023-11-01 ENCOUNTER — OUTPATIENT (OUTPATIENT)
Dept: OUTPATIENT SERVICES | Facility: HOSPITAL | Age: 83
LOS: 1 days | End: 2023-11-01
Payer: MEDICARE

## 2023-11-01 ENCOUNTER — LABORATORY RESULT (OUTPATIENT)
Age: 83
End: 2023-11-01

## 2023-11-01 DIAGNOSIS — Z86.2 PERSONAL HISTORY OF DISEASES OF THE BLOOD AND BLOOD-FORMING ORGANS AND CERTAIN DISORDERS INVOLVING THE IMMUNE MECHANISM: ICD-10-CM

## 2023-11-01 DIAGNOSIS — Z98.1 ARTHRODESIS STATUS: Chronic | ICD-10-CM

## 2023-11-01 DIAGNOSIS — D69.6 THROMBOCYTOPENIA, UNSPECIFIED: ICD-10-CM

## 2023-11-01 PROCEDURE — 85027 COMPLETE CBC AUTOMATED: CPT

## 2023-11-01 PROCEDURE — 36416 COLLJ CAPILLARY BLOOD SPEC: CPT

## 2023-11-02 DIAGNOSIS — Z86.2 PERSONAL HISTORY OF DISEASES OF THE BLOOD AND BLOOD-FORMING ORGANS AND CERTAIN DISORDERS INVOLVING THE IMMUNE MECHANISM: ICD-10-CM

## 2023-11-02 LAB
HCT VFR BLD CALC: 37.5 %
HGB BLD-MCNC: 13.2 G/DL
MCHC RBC-ENTMCNC: 31.4 PG
MCHC RBC-ENTMCNC: 35.2 G/DL
MCV RBC AUTO: 89.3 FL
PLATELET # BLD AUTO: 135 K/UL
PMV BLD: 11.1 FL
RBC # BLD: 4.2 M/UL
RBC # FLD: 13.1 %
WBC # FLD AUTO: 5.59 K/UL

## 2023-12-04 ENCOUNTER — OUTPATIENT (OUTPATIENT)
Dept: OUTPATIENT SERVICES | Facility: HOSPITAL | Age: 83
LOS: 1 days | End: 2023-12-04
Payer: MEDICARE

## 2023-12-04 ENCOUNTER — LABORATORY RESULT (OUTPATIENT)
Age: 83
End: 2023-12-04

## 2023-12-04 ENCOUNTER — APPOINTMENT (OUTPATIENT)
Dept: HEMATOLOGY ONCOLOGY | Facility: CLINIC | Age: 83
End: 2023-12-04
Payer: MEDICARE

## 2023-12-04 DIAGNOSIS — Z98.1 ARTHRODESIS STATUS: Chronic | ICD-10-CM

## 2023-12-04 DIAGNOSIS — D69.6 THROMBOCYTOPENIA, UNSPECIFIED: ICD-10-CM

## 2023-12-04 LAB
HCT VFR BLD CALC: 37.2 %
HGB BLD-MCNC: 13.1 G/DL
MCHC RBC-ENTMCNC: 30.7 PG
MCHC RBC-ENTMCNC: 35.2 G/DL
MCV RBC AUTO: 87.1 FL
PLATELET # BLD AUTO: 107 K/UL
PMV BLD: 11.7 FL
RBC # BLD: 4.27 M/UL
RBC # FLD: 12.8 %
WBC # FLD AUTO: 5.97 K/UL

## 2023-12-04 PROCEDURE — 85027 COMPLETE CBC AUTOMATED: CPT

## 2023-12-04 PROCEDURE — 99212 OFFICE O/P EST SF 10 MIN: CPT

## 2023-12-05 DIAGNOSIS — D69.6 THROMBOCYTOPENIA, UNSPECIFIED: ICD-10-CM

## 2023-12-13 ENCOUNTER — INPATIENT (INPATIENT)
Facility: HOSPITAL | Age: 83
LOS: 1 days | Discharge: HOME | DRG: 282 | End: 2023-12-15
Attending: STUDENT IN AN ORGANIZED HEALTH CARE EDUCATION/TRAINING PROGRAM | Admitting: STUDENT IN AN ORGANIZED HEALTH CARE EDUCATION/TRAINING PROGRAM
Payer: MEDICARE

## 2023-12-13 VITALS
TEMPERATURE: 97 F | RESPIRATION RATE: 17 BRPM | SYSTOLIC BLOOD PRESSURE: 115 MMHG | HEART RATE: 133 BPM | WEIGHT: 199.96 LBS | DIASTOLIC BLOOD PRESSURE: 80 MMHG | OXYGEN SATURATION: 98 %

## 2023-12-13 DIAGNOSIS — I48.91 UNSPECIFIED ATRIAL FIBRILLATION: ICD-10-CM

## 2023-12-13 DIAGNOSIS — Z98.1 ARTHRODESIS STATUS: Chronic | ICD-10-CM

## 2023-12-13 LAB
ALBUMIN SERPL ELPH-MCNC: 3.6 G/DL — SIGNIFICANT CHANGE UP (ref 3.5–5.2)
ALBUMIN SERPL ELPH-MCNC: 3.6 G/DL — SIGNIFICANT CHANGE UP (ref 3.5–5.2)
ALP SERPL-CCNC: 73 U/L — SIGNIFICANT CHANGE UP (ref 30–115)
ALP SERPL-CCNC: 73 U/L — SIGNIFICANT CHANGE UP (ref 30–115)
ALT FLD-CCNC: 24 U/L — SIGNIFICANT CHANGE UP (ref 0–41)
ALT FLD-CCNC: 24 U/L — SIGNIFICANT CHANGE UP (ref 0–41)
ANION GAP SERPL CALC-SCNC: 13 MMOL/L — SIGNIFICANT CHANGE UP (ref 7–14)
ANION GAP SERPL CALC-SCNC: 13 MMOL/L — SIGNIFICANT CHANGE UP (ref 7–14)
APTT BLD: 29.6 SEC — SIGNIFICANT CHANGE UP (ref 27–39.2)
APTT BLD: 29.6 SEC — SIGNIFICANT CHANGE UP (ref 27–39.2)
AST SERPL-CCNC: 52 U/L — HIGH (ref 0–41)
AST SERPL-CCNC: 52 U/L — HIGH (ref 0–41)
BASE EXCESS BLDV CALC-SCNC: -1.7 MMOL/L — SIGNIFICANT CHANGE UP (ref -2–3)
BASE EXCESS BLDV CALC-SCNC: -1.7 MMOL/L — SIGNIFICANT CHANGE UP (ref -2–3)
BASOPHILS # BLD AUTO: 0.04 K/UL — SIGNIFICANT CHANGE UP (ref 0–0.2)
BASOPHILS # BLD AUTO: 0.04 K/UL — SIGNIFICANT CHANGE UP (ref 0–0.2)
BASOPHILS NFR BLD AUTO: 0.6 % — SIGNIFICANT CHANGE UP (ref 0–1)
BASOPHILS NFR BLD AUTO: 0.6 % — SIGNIFICANT CHANGE UP (ref 0–1)
BILIRUB SERPL-MCNC: 0.7 MG/DL — SIGNIFICANT CHANGE UP (ref 0.2–1.2)
BILIRUB SERPL-MCNC: 0.7 MG/DL — SIGNIFICANT CHANGE UP (ref 0.2–1.2)
BUN SERPL-MCNC: 37 MG/DL — HIGH (ref 10–20)
BUN SERPL-MCNC: 37 MG/DL — HIGH (ref 10–20)
CA-I SERPL-SCNC: 1.18 MMOL/L — SIGNIFICANT CHANGE UP (ref 1.15–1.33)
CA-I SERPL-SCNC: 1.18 MMOL/L — SIGNIFICANT CHANGE UP (ref 1.15–1.33)
CALCIUM SERPL-MCNC: 8.9 MG/DL — SIGNIFICANT CHANGE UP (ref 8.4–10.5)
CALCIUM SERPL-MCNC: 8.9 MG/DL — SIGNIFICANT CHANGE UP (ref 8.4–10.5)
CHLORIDE SERPL-SCNC: 105 MMOL/L — SIGNIFICANT CHANGE UP (ref 98–110)
CHLORIDE SERPL-SCNC: 105 MMOL/L — SIGNIFICANT CHANGE UP (ref 98–110)
CHOLEST SERPL-MCNC: 135 MG/DL — SIGNIFICANT CHANGE UP
CHOLEST SERPL-MCNC: 135 MG/DL — SIGNIFICANT CHANGE UP
CO2 SERPL-SCNC: 21 MMOL/L — SIGNIFICANT CHANGE UP (ref 17–32)
CO2 SERPL-SCNC: 21 MMOL/L — SIGNIFICANT CHANGE UP (ref 17–32)
CREAT SERPL-MCNC: 1.2 MG/DL — SIGNIFICANT CHANGE UP (ref 0.7–1.5)
CREAT SERPL-MCNC: 1.2 MG/DL — SIGNIFICANT CHANGE UP (ref 0.7–1.5)
EGFR: 60 ML/MIN/1.73M2 — SIGNIFICANT CHANGE UP
EGFR: 60 ML/MIN/1.73M2 — SIGNIFICANT CHANGE UP
EOSINOPHIL # BLD AUTO: 0.21 K/UL — SIGNIFICANT CHANGE UP (ref 0–0.7)
EOSINOPHIL # BLD AUTO: 0.21 K/UL — SIGNIFICANT CHANGE UP (ref 0–0.7)
EOSINOPHIL NFR BLD AUTO: 3 % — SIGNIFICANT CHANGE UP (ref 0–8)
EOSINOPHIL NFR BLD AUTO: 3 % — SIGNIFICANT CHANGE UP (ref 0–8)
FLUAV AG NPH QL: SIGNIFICANT CHANGE UP
FLUAV AG NPH QL: SIGNIFICANT CHANGE UP
FLUBV AG NPH QL: SIGNIFICANT CHANGE UP
FLUBV AG NPH QL: SIGNIFICANT CHANGE UP
GAS PNL BLDV: 135 MMOL/L — LOW (ref 136–145)
GAS PNL BLDV: 135 MMOL/L — LOW (ref 136–145)
GAS PNL BLDV: SIGNIFICANT CHANGE UP
GAS PNL BLDV: SIGNIFICANT CHANGE UP
GLUCOSE BLDC GLUCOMTR-MCNC: 156 MG/DL — HIGH (ref 70–99)
GLUCOSE BLDC GLUCOMTR-MCNC: 156 MG/DL — HIGH (ref 70–99)
GLUCOSE SERPL-MCNC: 207 MG/DL — HIGH (ref 70–99)
GLUCOSE SERPL-MCNC: 207 MG/DL — HIGH (ref 70–99)
HCO3 BLDV-SCNC: 24 MMOL/L — SIGNIFICANT CHANGE UP (ref 22–29)
HCO3 BLDV-SCNC: 24 MMOL/L — SIGNIFICANT CHANGE UP (ref 22–29)
HCT VFR BLD CALC: 42.9 % — SIGNIFICANT CHANGE UP (ref 42–52)
HCT VFR BLD CALC: 42.9 % — SIGNIFICANT CHANGE UP (ref 42–52)
HCT VFR BLDA CALC: 36 % — LOW (ref 39–51)
HCT VFR BLDA CALC: 36 % — LOW (ref 39–51)
HDLC SERPL-MCNC: 26 MG/DL — LOW
HDLC SERPL-MCNC: 26 MG/DL — LOW
HGB BLD CALC-MCNC: 12.1 G/DL — LOW (ref 12.6–17.4)
HGB BLD CALC-MCNC: 12.1 G/DL — LOW (ref 12.6–17.4)
HGB BLD-MCNC: 14.7 G/DL — SIGNIFICANT CHANGE UP (ref 14–18)
HGB BLD-MCNC: 14.7 G/DL — SIGNIFICANT CHANGE UP (ref 14–18)
IMM GRANULOCYTES NFR BLD AUTO: 0.6 % — HIGH (ref 0.1–0.3)
IMM GRANULOCYTES NFR BLD AUTO: 0.6 % — HIGH (ref 0.1–0.3)
INR BLD: 0.99 RATIO — SIGNIFICANT CHANGE UP (ref 0.65–1.3)
INR BLD: 0.99 RATIO — SIGNIFICANT CHANGE UP (ref 0.65–1.3)
LACTATE BLDV-MCNC: 1.7 MMOL/L — SIGNIFICANT CHANGE UP (ref 0.5–2)
LACTATE BLDV-MCNC: 1.7 MMOL/L — SIGNIFICANT CHANGE UP (ref 0.5–2)
LIPID PNL WITH DIRECT LDL SERPL: 56 MG/DL — SIGNIFICANT CHANGE UP
LIPID PNL WITH DIRECT LDL SERPL: 56 MG/DL — SIGNIFICANT CHANGE UP
LYMPHOCYTES # BLD AUTO: 1.65 K/UL — SIGNIFICANT CHANGE UP (ref 1.2–3.4)
LYMPHOCYTES # BLD AUTO: 1.65 K/UL — SIGNIFICANT CHANGE UP (ref 1.2–3.4)
LYMPHOCYTES # BLD AUTO: 23.3 % — SIGNIFICANT CHANGE UP (ref 20.5–51.1)
LYMPHOCYTES # BLD AUTO: 23.3 % — SIGNIFICANT CHANGE UP (ref 20.5–51.1)
MAGNESIUM SERPL-MCNC: 1.8 MG/DL — SIGNIFICANT CHANGE UP (ref 1.8–2.4)
MAGNESIUM SERPL-MCNC: 1.8 MG/DL — SIGNIFICANT CHANGE UP (ref 1.8–2.4)
MCHC RBC-ENTMCNC: 31.5 PG — HIGH (ref 27–31)
MCHC RBC-ENTMCNC: 31.5 PG — HIGH (ref 27–31)
MCHC RBC-ENTMCNC: 34.3 G/DL — SIGNIFICANT CHANGE UP (ref 32–37)
MCHC RBC-ENTMCNC: 34.3 G/DL — SIGNIFICANT CHANGE UP (ref 32–37)
MCV RBC AUTO: 91.9 FL — SIGNIFICANT CHANGE UP (ref 80–94)
MCV RBC AUTO: 91.9 FL — SIGNIFICANT CHANGE UP (ref 80–94)
MONOCYTES # BLD AUTO: 0.61 K/UL — HIGH (ref 0.1–0.6)
MONOCYTES # BLD AUTO: 0.61 K/UL — HIGH (ref 0.1–0.6)
MONOCYTES NFR BLD AUTO: 8.6 % — SIGNIFICANT CHANGE UP (ref 1.7–9.3)
MONOCYTES NFR BLD AUTO: 8.6 % — SIGNIFICANT CHANGE UP (ref 1.7–9.3)
NEUTROPHILS # BLD AUTO: 4.53 K/UL — SIGNIFICANT CHANGE UP (ref 1.4–6.5)
NEUTROPHILS # BLD AUTO: 4.53 K/UL — SIGNIFICANT CHANGE UP (ref 1.4–6.5)
NEUTROPHILS NFR BLD AUTO: 63.9 % — SIGNIFICANT CHANGE UP (ref 42.2–75.2)
NEUTROPHILS NFR BLD AUTO: 63.9 % — SIGNIFICANT CHANGE UP (ref 42.2–75.2)
NON HDL CHOLESTEROL: 109 MG/DL — SIGNIFICANT CHANGE UP
NON HDL CHOLESTEROL: 109 MG/DL — SIGNIFICANT CHANGE UP
NRBC # BLD: 0 /100 WBCS — SIGNIFICANT CHANGE UP (ref 0–0)
NRBC # BLD: 0 /100 WBCS — SIGNIFICANT CHANGE UP (ref 0–0)
NT-PROBNP SERPL-SCNC: 4362 PG/ML — HIGH (ref 0–300)
NT-PROBNP SERPL-SCNC: 4362 PG/ML — HIGH (ref 0–300)
PCO2 BLDV: 45 MMHG — SIGNIFICANT CHANGE UP (ref 42–55)
PCO2 BLDV: 45 MMHG — SIGNIFICANT CHANGE UP (ref 42–55)
PH BLDV: 7.34 — SIGNIFICANT CHANGE UP (ref 7.32–7.43)
PH BLDV: 7.34 — SIGNIFICANT CHANGE UP (ref 7.32–7.43)
PLATELET # BLD AUTO: 136 K/UL — SIGNIFICANT CHANGE UP (ref 130–400)
PLATELET # BLD AUTO: 136 K/UL — SIGNIFICANT CHANGE UP (ref 130–400)
PMV BLD: 12.4 FL — HIGH (ref 7.4–10.4)
PMV BLD: 12.4 FL — HIGH (ref 7.4–10.4)
PO2 BLDV: 31 MMHG — SIGNIFICANT CHANGE UP (ref 25–45)
PO2 BLDV: 31 MMHG — SIGNIFICANT CHANGE UP (ref 25–45)
POTASSIUM BLDV-SCNC: 4.7 MMOL/L — SIGNIFICANT CHANGE UP (ref 3.5–5.1)
POTASSIUM BLDV-SCNC: 4.7 MMOL/L — SIGNIFICANT CHANGE UP (ref 3.5–5.1)
POTASSIUM SERPL-MCNC: SIGNIFICANT CHANGE UP MMOL/L (ref 3.5–5)
POTASSIUM SERPL-MCNC: SIGNIFICANT CHANGE UP MMOL/L (ref 3.5–5)
POTASSIUM SERPL-SCNC: SIGNIFICANT CHANGE UP MMOL/L (ref 3.5–5)
POTASSIUM SERPL-SCNC: SIGNIFICANT CHANGE UP MMOL/L (ref 3.5–5)
PROT SERPL-MCNC: 6.4 G/DL — SIGNIFICANT CHANGE UP (ref 6–8)
PROT SERPL-MCNC: 6.4 G/DL — SIGNIFICANT CHANGE UP (ref 6–8)
PROTHROM AB SERPL-ACNC: 11.3 SEC — SIGNIFICANT CHANGE UP (ref 9.95–12.87)
PROTHROM AB SERPL-ACNC: 11.3 SEC — SIGNIFICANT CHANGE UP (ref 9.95–12.87)
RBC # BLD: 4.67 M/UL — LOW (ref 4.7–6.1)
RBC # BLD: 4.67 M/UL — LOW (ref 4.7–6.1)
RBC # FLD: 13 % — SIGNIFICANT CHANGE UP (ref 11.5–14.5)
RBC # FLD: 13 % — SIGNIFICANT CHANGE UP (ref 11.5–14.5)
RSV RNA NPH QL NAA+NON-PROBE: SIGNIFICANT CHANGE UP
RSV RNA NPH QL NAA+NON-PROBE: SIGNIFICANT CHANGE UP
SAO2 % BLDV: 43 % — LOW (ref 67–88)
SAO2 % BLDV: 43 % — LOW (ref 67–88)
SARS-COV-2 RNA SPEC QL NAA+PROBE: SIGNIFICANT CHANGE UP
SARS-COV-2 RNA SPEC QL NAA+PROBE: SIGNIFICANT CHANGE UP
SODIUM SERPL-SCNC: 139 MMOL/L — SIGNIFICANT CHANGE UP (ref 135–146)
SODIUM SERPL-SCNC: 139 MMOL/L — SIGNIFICANT CHANGE UP (ref 135–146)
TRIGL SERPL-MCNC: 259 MG/DL — HIGH
TRIGL SERPL-MCNC: 259 MG/DL — HIGH
TROPONIN T SERPL-MCNC: 0.18 NG/ML — CRITICAL HIGH
TROPONIN T SERPL-MCNC: 0.18 NG/ML — CRITICAL HIGH
TROPONIN T SERPL-MCNC: 0.2 NG/ML — CRITICAL HIGH
TROPONIN T SERPL-MCNC: 0.2 NG/ML — CRITICAL HIGH
WBC # BLD: 7.08 K/UL — SIGNIFICANT CHANGE UP (ref 4.8–10.8)
WBC # BLD: 7.08 K/UL — SIGNIFICANT CHANGE UP (ref 4.8–10.8)
WBC # FLD AUTO: 7.08 K/UL — SIGNIFICANT CHANGE UP (ref 4.8–10.8)
WBC # FLD AUTO: 7.08 K/UL — SIGNIFICANT CHANGE UP (ref 4.8–10.8)

## 2023-12-13 PROCEDURE — 99285 EMERGENCY DEPT VISIT HI MDM: CPT | Mod: FS

## 2023-12-13 PROCEDURE — 93325 DOPPLER ECHO COLOR FLOW MAPG: CPT

## 2023-12-13 PROCEDURE — 84439 ASSAY OF FREE THYROXINE: CPT

## 2023-12-13 PROCEDURE — 36415 COLL VENOUS BLD VENIPUNCTURE: CPT

## 2023-12-13 PROCEDURE — 80048 BASIC METABOLIC PNL TOTAL CA: CPT

## 2023-12-13 PROCEDURE — 80061 LIPID PANEL: CPT

## 2023-12-13 PROCEDURE — 83735 ASSAY OF MAGNESIUM: CPT

## 2023-12-13 PROCEDURE — 71045 X-RAY EXAM CHEST 1 VIEW: CPT | Mod: 26

## 2023-12-13 PROCEDURE — 85025 COMPLETE CBC W/AUTO DIFF WBC: CPT

## 2023-12-13 PROCEDURE — 84443 ASSAY THYROID STIM HORMONE: CPT

## 2023-12-13 PROCEDURE — 99222 1ST HOSP IP/OBS MODERATE 55: CPT

## 2023-12-13 PROCEDURE — 93308 TTE F-UP OR LMTD: CPT

## 2023-12-13 PROCEDURE — 93312 ECHO TRANSESOPHAGEAL: CPT

## 2023-12-13 PROCEDURE — 82962 GLUCOSE BLOOD TEST: CPT

## 2023-12-13 PROCEDURE — 85027 COMPLETE CBC AUTOMATED: CPT

## 2023-12-13 PROCEDURE — 83036 HEMOGLOBIN GLYCOSYLATED A1C: CPT

## 2023-12-13 PROCEDURE — 93320 DOPPLER ECHO COMPLETE: CPT

## 2023-12-13 PROCEDURE — 93005 ELECTROCARDIOGRAM TRACING: CPT

## 2023-12-13 PROCEDURE — 80076 HEPATIC FUNCTION PANEL: CPT

## 2023-12-13 PROCEDURE — 71275 CT ANGIOGRAPHY CHEST: CPT | Mod: 26,MA

## 2023-12-13 PROCEDURE — 84484 ASSAY OF TROPONIN QUANT: CPT

## 2023-12-13 RX ORDER — TAMSULOSIN HYDROCHLORIDE 0.4 MG/1
1 CAPSULE ORAL
Refills: 0 | DISCHARGE

## 2023-12-13 RX ORDER — METOPROLOL TARTRATE 50 MG
25 TABLET ORAL EVERY 6 HOURS
Refills: 0 | Status: DISCONTINUED | OUTPATIENT
Start: 2023-12-13 | End: 2023-12-13

## 2023-12-13 RX ORDER — DEXTROSE 50 % IN WATER 50 %
15 SYRINGE (ML) INTRAVENOUS ONCE
Refills: 0 | Status: DISCONTINUED | OUTPATIENT
Start: 2023-12-13 | End: 2023-12-15

## 2023-12-13 RX ORDER — DEXTROSE 50 % IN WATER 50 %
25 SYRINGE (ML) INTRAVENOUS ONCE
Refills: 0 | Status: DISCONTINUED | OUTPATIENT
Start: 2023-12-13 | End: 2023-12-15

## 2023-12-13 RX ORDER — APIXABAN 2.5 MG/1
5 TABLET, FILM COATED ORAL ONCE
Refills: 0 | Status: COMPLETED | OUTPATIENT
Start: 2023-12-13 | End: 2023-12-13

## 2023-12-13 RX ORDER — DILTIAZEM HCL 120 MG
5 CAPSULE, EXT RELEASE 24 HR ORAL
Qty: 125 | Refills: 0 | Status: DISCONTINUED | OUTPATIENT
Start: 2023-12-13 | End: 2023-12-13

## 2023-12-13 RX ORDER — GLUCAGON INJECTION, SOLUTION 0.5 MG/.1ML
1 INJECTION, SOLUTION SUBCUTANEOUS ONCE
Refills: 0 | Status: DISCONTINUED | OUTPATIENT
Start: 2023-12-13 | End: 2023-12-15

## 2023-12-13 RX ORDER — CHOLECALCIFEROL (VITAMIN D3) 125 MCG
1 CAPSULE ORAL
Refills: 0 | DISCHARGE

## 2023-12-13 RX ORDER — ASPIRIN/CALCIUM CARB/MAGNESIUM 324 MG
81 TABLET ORAL DAILY
Refills: 0 | Status: DISCONTINUED | OUTPATIENT
Start: 2023-12-14 | End: 2023-12-14

## 2023-12-13 RX ORDER — METOPROLOL TARTRATE 50 MG
25 TABLET ORAL ONCE
Refills: 0 | Status: DISCONTINUED | OUTPATIENT
Start: 2023-12-13 | End: 2023-12-13

## 2023-12-13 RX ORDER — SODIUM CHLORIDE 9 MG/ML
1000 INJECTION INTRAMUSCULAR; INTRAVENOUS; SUBCUTANEOUS ONCE
Refills: 0 | Status: COMPLETED | OUTPATIENT
Start: 2023-12-13 | End: 2023-12-13

## 2023-12-13 RX ORDER — METOPROLOL TARTRATE 50 MG
25 TABLET ORAL EVERY 6 HOURS
Refills: 0 | Status: DISCONTINUED | OUTPATIENT
Start: 2023-12-13 | End: 2023-12-15

## 2023-12-13 RX ORDER — METFORMIN HYDROCHLORIDE 850 MG/1
1 TABLET ORAL
Refills: 0 | DISCHARGE

## 2023-12-13 RX ORDER — ASPIRIN/CALCIUM CARB/MAGNESIUM 324 MG
243 TABLET ORAL DAILY
Refills: 0 | Status: DISCONTINUED | OUTPATIENT
Start: 2023-12-13 | End: 2023-12-13

## 2023-12-13 RX ORDER — DEXTROSE 50 % IN WATER 50 %
12.5 SYRINGE (ML) INTRAVENOUS ONCE
Refills: 0 | Status: DISCONTINUED | OUTPATIENT
Start: 2023-12-13 | End: 2023-12-15

## 2023-12-13 RX ORDER — DILTIAZEM HCL 120 MG
20 CAPSULE, EXT RELEASE 24 HR ORAL ONCE
Refills: 0 | Status: COMPLETED | OUTPATIENT
Start: 2023-12-13 | End: 2023-12-13

## 2023-12-13 RX ORDER — METOPROLOL TARTRATE 50 MG
25 TABLET ORAL ONCE
Refills: 0 | Status: COMPLETED | OUTPATIENT
Start: 2023-12-13 | End: 2023-12-13

## 2023-12-13 RX ORDER — TAMSULOSIN HYDROCHLORIDE 0.4 MG/1
0.4 CAPSULE ORAL
Refills: 0 | Status: DISCONTINUED | OUTPATIENT
Start: 2023-12-13 | End: 2023-12-15

## 2023-12-13 RX ORDER — SODIUM CHLORIDE 9 MG/ML
1000 INJECTION, SOLUTION INTRAVENOUS
Refills: 0 | Status: DISCONTINUED | OUTPATIENT
Start: 2023-12-13 | End: 2023-12-15

## 2023-12-13 RX ORDER — PANTOPRAZOLE SODIUM 20 MG/1
40 TABLET, DELAYED RELEASE ORAL
Refills: 0 | Status: DISCONTINUED | OUTPATIENT
Start: 2023-12-13 | End: 2023-12-15

## 2023-12-13 RX ORDER — APIXABAN 2.5 MG/1
5 TABLET, FILM COATED ORAL EVERY 12 HOURS
Refills: 0 | Status: DISCONTINUED | OUTPATIENT
Start: 2023-12-14 | End: 2023-12-15

## 2023-12-13 RX ORDER — OMEPRAZOLE 10 MG/1
1 CAPSULE, DELAYED RELEASE ORAL
Refills: 0 | DISCHARGE

## 2023-12-13 RX ORDER — UBIDECARENONE 100 MG
1 CAPSULE ORAL
Refills: 0 | DISCHARGE

## 2023-12-13 RX ORDER — INSULIN LISPRO 100/ML
VIAL (ML) SUBCUTANEOUS AT BEDTIME
Refills: 0 | Status: DISCONTINUED | OUTPATIENT
Start: 2023-12-13 | End: 2023-12-15

## 2023-12-13 RX ORDER — OMEGA-3 ACID ETHYL ESTERS 1 G
1 CAPSULE ORAL
Refills: 0 | DISCHARGE

## 2023-12-13 RX ORDER — GLIMEPIRIDE 1 MG
1 TABLET ORAL
Refills: 0 | DISCHARGE

## 2023-12-13 RX ORDER — ATORVASTATIN CALCIUM 80 MG/1
40 TABLET, FILM COATED ORAL AT BEDTIME
Refills: 0 | Status: DISCONTINUED | OUTPATIENT
Start: 2023-12-13 | End: 2023-12-15

## 2023-12-13 RX ORDER — DILTIAZEM HCL 120 MG
15 CAPSULE, EXT RELEASE 24 HR ORAL
Qty: 125 | Refills: 0 | Status: DISCONTINUED | OUTPATIENT
Start: 2023-12-13 | End: 2023-12-14

## 2023-12-13 RX ORDER — INSULIN LISPRO 100/ML
VIAL (ML) SUBCUTANEOUS
Refills: 0 | Status: DISCONTINUED | OUTPATIENT
Start: 2023-12-13 | End: 2023-12-15

## 2023-12-13 RX ADMIN — Medication 25 MILLIGRAM(S): at 16:37

## 2023-12-13 RX ADMIN — Medication 25 MILLIGRAM(S): at 21:28

## 2023-12-13 RX ADMIN — Medication 20 MILLIGRAM(S): at 09:25

## 2023-12-13 RX ADMIN — SODIUM CHLORIDE 1000 MILLILITER(S): 9 INJECTION INTRAMUSCULAR; INTRAVENOUS; SUBCUTANEOUS at 11:47

## 2023-12-13 RX ADMIN — Medication 15 MG/HR: at 21:18

## 2023-12-13 RX ADMIN — Medication 5 MG/HR: at 13:32

## 2023-12-13 RX ADMIN — SODIUM CHLORIDE 1000 MILLILITER(S): 9 INJECTION INTRAMUSCULAR; INTRAVENOUS; SUBCUTANEOUS at 09:25

## 2023-12-13 RX ADMIN — TAMSULOSIN HYDROCHLORIDE 0.4 MILLIGRAM(S): 0.4 CAPSULE ORAL at 17:14

## 2023-12-13 RX ADMIN — Medication 243 MILLIGRAM(S): at 13:17

## 2023-12-13 RX ADMIN — Medication 15 MG/HR: at 16:44

## 2023-12-13 RX ADMIN — APIXABAN 5 MILLIGRAM(S): 2.5 TABLET, FILM COATED ORAL at 21:17

## 2023-12-13 RX ADMIN — ATORVASTATIN CALCIUM 40 MILLIGRAM(S): 80 TABLET, FILM COATED ORAL at 21:18

## 2023-12-13 NOTE — ED ADULT NURSE NOTE - NSFALLUNIVINTERV_ED_ALL_ED
Bed/Stretcher in lowest position, wheels locked, appropriate side rails in place/Call bell, personal items and telephone in reach/Instruct patient to call for assistance before getting out of bed/chair/stretcher/Non-slip footwear applied when patient is off stretcher/[place] to call system/Physically safe environment - no spills, clutter or unnecessary equipment/Purposeful proactive rounding/Room/bathroom lighting operational, light cord in reach Bed/Stretcher in lowest position, wheels locked, appropriate side rails in place/Call bell, personal items and telephone in reach/Instruct patient to call for assistance before getting out of bed/chair/stretcher/Non-slip footwear applied when patient is off stretcher/[place] to call system/Physically safe environment - no spills, clutter or unnecessary equipment/Purposeful proactive rounding/Room/bathroom lighting operational, light cord in reach

## 2023-12-13 NOTE — ED PROVIDER NOTE - CLINICAL SUMMARY MEDICAL DECISION MAKING FREE TEXT BOX
84 yo male, PMHx of HTN, DM, DLD, GERD, prostate cancer (sp seed implant in 2008), pAfib, Hx of  SVT follows with Dr. Motta, presenting with 3 days of intermittent dyspnea on exertion, alleviated by rest, associated with palpitations. Denies fevers, chills, chest pain, nausea, vomiting, headache, dizziness, abdominal pain. EMS arrived and patient was hypotensive to 80s systolic, improved to 110 after 500 cc NS. Labs and EKG were ordered and reviewed.  Imaging was ordered and reviewed by me.  Appropriate medications for patient's presenting complaints were ordered and effects were reassessed.  Patient's records (prior hospital) were reviewed.  Escalation to admission/observation was considered.  Patient requires inpatient hospitalization. 82 yo male, PMHx of HTN, DM, DLD, GERD, prostate cancer (sp seed implant in 2008), pAfib, Hx of  SVT follows with Dr. Motta, presenting with 3 days of intermittent dyspnea on exertion, alleviated by rest, associated with palpitations. Denies fevers, chills, chest pain, nausea, vomiting, headache, dizziness, abdominal pain. EMS arrived and patient was hypotensive to 80s systolic, improved to 110 after 500 cc NS. Labs and EKG were ordered and reviewed.  Imaging was ordered and reviewed by me.  Appropriate medications for patient's presenting complaints were ordered and effects were reassessed.  Patient's records (prior hospital) were reviewed.  Escalation to admission/observation was considered.  Patient requires inpatient hospitalization. 84 yo male, PMHx of HTN, DM, DLD, GERD, prostate cancer (sp seed implant in 2008), pAfib, Hx of  SVT follows with Dr. Motta, presenting with 3 days of intermittent dyspnea on exertion, alleviated by rest, associated with palpitations. Denies fevers, chills, chest pain, nausea, vomiting, headache, dizziness, abdominal pain. EMS arrived and patient was hypotensive to 80s systolic, improved to 110 after 500 cc NS. Labs and EKG were ordered and reviewed.  Imaging was ordered and reviewed by me.  Appropriate medications for patient's presenting complaints were ordered and effects were reassessed.  Cardizem 20 mg given with improvement in HR 150s to 120s but BP decreased from 110s to 90 systolic. Patient's records (prior hospital) were reviewed.  Escalation to admission/observation was considered.  Patient requires inpatient hospitalization. 82 yo male, PMHx of HTN, DM, DLD, GERD, prostate cancer (sp seed implant in 2008), pAfib, Hx of  SVT follows with Dr. Motta, presenting with 3 days of intermittent dyspnea on exertion, alleviated by rest, associated with palpitations. Denies fevers, chills, chest pain, nausea, vomiting, headache, dizziness, abdominal pain. EMS arrived and patient was hypotensive to 80s systolic, improved to 110 after 500 cc NS. Labs and EKG were ordered and reviewed.  Imaging was ordered and reviewed by me.  Appropriate medications for patient's presenting complaints were ordered and effects were reassessed.  Cardizem 20 mg given with improvement in HR 150s to 120s but BP decreased from 110s to 90 systolic. Patient's records (prior hospital) were reviewed.  Escalation to admission/observation was considered.  Patient requires inpatient hospitalization. 82 yo male, PMHx of HTN, DM, DLD, GERD, prostate cancer (sp seed implant in 2008), pAfib, Hx of  SVT follows with Dr. Motta, presenting with 3 days of intermittent dyspnea on exertion, alleviated by rest, associated with palpitations. Denies fevers, chills, chest pain, nausea, vomiting, headache, dizziness, abdominal pain. EMS arrived and patient was hypotensive to 80s systolic, improved to 110 after 500 cc NS. Labs and EKG were ordered and reviewed.  Imaging was ordered and reviewed by me.  Appropriate medications for patient's presenting complaints were ordered and effects were reassessed.  Cardizem 20 mg given with improvement in HR 150s to 120s but BP decreased from 110s to 90 systolic.  Cardiology consulted. Patient's records (prior hospital) were reviewed.  Escalation to admission/observation was considered.  Patient requires inpatient hospitalization. 82 yo male, PMHx of HTN, DM, DLD, GERD, prostate cancer (sp seed implant in 2008), pAfib, Hx of  SVT follows with Dr. Motta, presenting with 3 days of intermittent dyspnea on exertion, alleviated by rest, associated with palpitations. Denies fevers, chills, chest pain, nausea, vomiting, headache, dizziness, abdominal pain. EMS arrived and patient was hypotensive to 80s systolic, improved to 110 after 500 cc NS. Labs and EKG were ordered and reviewed.  Imaging was ordered and reviewed by me.  Appropriate medications for patient's presenting complaints were ordered and effects were reassessed.  Cardizem 20 mg given with improvement in HR 150s to 120s but BP decreased from 110s to 90 systolic.  Cardiology consulted recommending cardizem ggt. Patient's records (prior hospital) were reviewed.  Escalation to admission/observation was considered.  Patient requires inpatient hospitalization - cardiac tele. 84 yo male, PMHx of HTN, DM, DLD, GERD, prostate cancer (sp seed implant in 2008), pAfib, Hx of  SVT follows with Dr. Motta, presenting with 3 days of intermittent dyspnea on exertion, alleviated by rest, associated with palpitations. Denies fevers, chills, chest pain, nausea, vomiting, headache, dizziness, abdominal pain. EMS arrived and patient was hypotensive to 80s systolic, improved to 110 after 500 cc NS. Labs and EKG were ordered and reviewed.  Imaging was ordered and reviewed by me.  Appropriate medications for patient's presenting complaints were ordered and effects were reassessed.  Cardizem 20 mg given with improvement in HR 150s to 120s but BP decreased from 110s to 90 systolic.  Cardiology consulted recommending cardizem ggt. Patient's records (prior hospital) were reviewed.  Escalation to admission/observation was considered.  Patient requires inpatient hospitalization - cardiac tele.

## 2023-12-13 NOTE — H&P ADULT - TIME BILLING
Bedside evaluation and exam  Review of all records  Discussion with patient  Care coordination for DCCV  Discussion of case with EP

## 2023-12-13 NOTE — PATIENT PROFILE ADULT - FALL HARM RISK - HARM RISK INTERVENTIONS
Assistance OOB with selected safe patient handling equipment/Communicate Risk of Fall with Harm to all staff/Discuss with provider need for PT consult/Monitor gait and stability/Provide patient with walking aids - walker, cane, crutches/Reinforce activity limits and safety measures with patient and family/Tailored Fall Risk Interventions/Visual Cue: Yellow wristband and red socks/Bed in lowest position, wheels locked, appropriate side rails in place/Call bell, personal items and telephone in reach/Instruct patient to call for assistance before getting out of bed or chair/Non-slip footwear when patient is out of bed/[place] to call system/Physically safe environment - no spills, clutter or unnecessary equipment/Purposeful Proactive Rounding/Room/bathroom lighting operational, light cord in reach Assistance OOB with selected safe patient handling equipment/Communicate Risk of Fall with Harm to all staff/Discuss with provider need for PT consult/Monitor gait and stability/Provide patient with walking aids - walker, cane, crutches/Reinforce activity limits and safety measures with patient and family/Tailored Fall Risk Interventions/Visual Cue: Yellow wristband and red socks/Bed in lowest position, wheels locked, appropriate side rails in place/Call bell, personal items and telephone in reach/Instruct patient to call for assistance before getting out of bed or chair/Non-slip footwear when patient is out of bed/[place] to call system/Physically safe environment - no spills, clutter or unnecessary equipment/Purposeful Proactive Rounding/Room/bathroom lighting operational, light cord in reach

## 2023-12-13 NOTE — H&P ADULT - HISTORY OF PRESENT ILLNESS
83 year old male with PMHx of HTN, HLD, DM, Paroxysmal A Fib (refused to take anticoagulation), SVT, prostate CA in remission who presented to the ED with       In the ED: VS 96.8F, , /80, RR 17, O2 sat 98% on room air.  83 year old male with PMHx of HTN, HLD, DM, Paroxysmal AFib (refused to take anticoagulation), SVT, BPH, GERD, and prostate CA in remission who presented to the ED with acute onset of dizziness, lightheaded, associated with shortness of breath for 2 days.       In the ED: VS 96.8F, , /80, RR 17, O2 sat 98% on room air. Labs were significant for troponin 0.2 and pro-BNP of 4362. ECG revealing Afib with RVR,  bpm with RBBB. CXR w/ clear lungs. Pt received in  mg x1, IV Diltiazem 20 mg x1, and was started on Diltiazem gtt @ 5 mg/hr.  83 year old male patient of Adrianne Fabian with PMHx of HTN, HLD, DM, Paroxysmal AFib (refused to take anticoagulation), SVT, BPH, GERD, and prostate CA in remission who presented to the ED with acute onset of dizziness, lightheaded, associated with shortness of breath for 2 days. Patient states he had an episode of dizziness and SOB but no LOC. Patient recently admitted in July for SVT HR in the 200s and successful cardioversion to SR. He says this happens to him often however once he sits down and rests it usually passes. As per patient he was diagnosed with Afib 30 years ago and ever since hes refused any blood thinners or unnecessary procedures. Patient saying he does not want to be on blood thinners at this time.  Patient denies CP, palpitations, syncope, orthopnea.       In the ED: VS 96.8F, , /80, RR 17, O2 sat 98% on room air. Labs were significant for troponin 0.2 and pro-BNP of 4362. ECG revealing Afib with RVR,  bpm with RBBB. CXR w/ clear lungs. Pt received in  mg x1, IV Diltiazem 20 mg x1, and was started on Diltiazem gtt @ 5 mg/hr.

## 2023-12-13 NOTE — H&P ADULT - NS ATTEND AMEND GEN_ALL_CORE FT
83yoM with paroxsymal Afib in 1994 (not on anticoagulation), HTN, HLD, DM, and recent hospitalization in 6/2023 for SVT who presented with dizziness and lightheadedness, found to be in Aflutter.     In 6/2023, patient activated EMS for acute-onset SOB. In the ambulance, he was found to be in a tachyarrhythmia. EMS gave adenodine 6 mg followed by 12 mg, with no improvement in his rhythm. He was noted to be hypotensive, and therefore underwent cardioversion with 100J. After his hospitalization, he was discharged with an MCOT and found to have two episodes of SVT. Since then no other arrhythmia. ILR was offered in the outpatient setting, and was declined.     Today, patient with Aflutter and HR in the 120s. He was reluctant to start an anticoagulant, but after conversations with his wife and son, he is now amenable. Plan for GANGA and DCCV.     Upon speaking with the patient, he would like to avoid elective procedures as much as possible. We discussed Afib/flutter ablation, but based on his preferences, it may be worth trialing an antiarrhythmic first. Given his troponin was 0.20 --> 0.18 on admission, I am concerned about underlying heart disease and would not choose flecainide or propafenone. Will check his EF after DCCV and consider sotalol versus amiodarone. Plan to consult EP for their input.     Plan:   - Continue Eliquis 5 mg BID  - Patient to remain NPO  - GANGA/DCCV  - Limited TTE for LVEF after cardioversion  - Hold metoprolol after cardioversion and consider initiation of sotalol versus amiodarone, depending on LVEF and EP recs

## 2023-12-13 NOTE — H&P ADULT - NSHPPHYSICALEXAM_GEN_ALL_CORE
VITALS:   T(C): 36.3 (12-13-23 @ 09:17), Max: 36.3 (12-13-23 @ 09:17)  HR: 115 (12-13-23 @ 14:07) (113 - 150)  BP: 112/77 (12-13-23 @ 13:55) (94/64 - 127/85)  RR: 16 (12-13-23 @ 13:55) (16 - 17)  SpO2: 99% (12-13-23 @ 13:55) (98% - 99%)    GENERAL: NAD, lying in bed comfortably  HEAD:  Atraumatic, normocephalic  EYES: EOMI, PERRLA, conjunctiva and sclera clear  ENT: Moist mucous membranes  NECK: Supple, no JVD  HEART: Regular rate and rhythm, no murmurs, rubs, or gallops  LUNGS: Unlabored respirations.  Clear to auscultation bilaterally, no crackles, wheezing, or rhonchi  ABDOMEN: Soft, nontender, nondistended, +BS  EXTREMITIES: 2+ peripheral pulses bilaterally. No clubbing, cyanosis, or edema  NERVOUS SYSTEM:  A&Ox3, no focal deficits   SKIN: No rashes or lesions

## 2023-12-13 NOTE — PATIENT PROFILE ADULT - FUNCTIONAL ASSESSMENT - DAILY ACTIVITY 1.
12/20/22 8:32 AM     VB [employer] SmartForm used to document caregap status      Reina Rogers
4 = No assist / stand by assistance

## 2023-12-13 NOTE — H&P ADULT - ASSESSMENT
Assessment:  83 year old male with PMHx of HTN, HLD, DM, Paroxysmal AFib (refused to take anticoagulation), SVT, BPH, GERD, and prostate CA in remission who presented to the ED with acute onset of dizziness, lightheaded, associated with shortness of breath for 2 days. Pt found to be in Afib with RVR. Pt admitted to cardiac telemetry for further work-up and management.      Problems discussed and associated plan:    #Afib w/ RVR  CHADSVASC score of 4  - Continue on Diltiazem gtt  @ 15 mg/hr, goal HR < 90   - Increase home metoprolol tartrate to 25 mg TID (from BID)  - Educated pt the risk/benefits of OAC, pt adamantly still refusing  - repeat troponin; (troponin 0.2 likely 2/2 to demand ischemia i/s/o AF with RVR)   - f/u HBA1C, TSH, FT4, and lipid profile   - Monitor on telemetry    #HTN  - hold off on enalapril for now, to give room for BP     #HLD  - home med: simvastatin  - c/w atorvastatin 40 mg qd   - f/u lipid panel in AM    #BPH  - continue on flomax 0.4 mg BID (home med)     #GERD  - home med: omeprazole   - continue on protonix     #DM  - f/u HBA1C in AM  - monitor FS AC/HS  - start insulin sliding scale     FULL CODE  PT: IAT  Diet: consistent carb w/ snacks       Please contact me with any questions or concerns at [phone]. Assessment:  83 year old male with PMHx of HTN, HLD, DM, Paroxysmal AFib (refused to take anticoagulation), SVT, BPH, GERD, and prostate CA in remission who presented to the ED with acute onset of dizziness, lightheaded, associated with shortness of breath for 2 days. Pt found to be in Afib with RVR. Pt admitted to cardiac telemetry for further work-up and management.      Problems discussed and associated plan:    #Afib w/ RVR  CHADSVASC score of 4  - Continue on Diltiazem gtt  @ 15 mg/hr, goal HR < 90   - Increase home metoprolol tartrate to 25 mg TID (from BID)  - Educated pt the risk/benefits of OAC, pt adamantly still refusing  - repeat troponin; (troponin 0.2 likely 2/2 to demand ischemia i/s/o AF with RVR)   - f/u HBA1C, TSH, FT4, and lipid profile   - Monitor on telemetry    #HTN  - hold off on enalapril for now, to give room for BP     #HLD  - home med: simvastatin  - c/w atorvastatin 40 mg qd   - f/u lipid panel in AM    #BPH  - continue on flomax 0.4 mg BID (home med)     #GERD  - home med: omeprazole   - continue on protonix     #DM  - f/u HBA1C in AM  - monitor FS AC/HS  - start insulin sliding scale     FULL CODE  PT: IAT  Diet: consistent carb w/ snacks       Please contact me with any questions or concerns at [phone]. Assessment:  83 year old male with PMHx of HTN, HLD, DM, Paroxysmal AFib (refused to take anticoagulation), SVT, BPH, GERD, and prostate CA in remission who presented to the ED with acute onset of dizziness, lightheaded, associated with shortness of breath for 2 days. Pt found to be in Afib with RVR. Pt admitted to cardiac telemetry for further work-up and management.      Problems discussed and associated plan:    #Afib w/ RVR  CHADSVASC score of 4  - Continue on Diltiazem gtt  @ 15 mg/hr, goal HR < 90   - Increase home metoprolol tartrate to 25 mg q6 (from BID), Hold if SBP < 90, HR < 60   - Educated pt the risk/benefits of OAC, will further discuss with patient  - NPO after midnight for possible GANGA/DCCV  - repeat troponin; (troponin 0.2 likely 2/2 to demand ischemia i/s/o AF with RVR)   - f/u HBA1C, TSH, FT4, and lipid profile   - Monitor on telemetry    #HTN  - hold off on enalapril for now, to give room for BP     #HLD  - home med: simvastatin  - c/w atorvastatin 40 mg qd   - f/u lipid panel in AM    #BPH  - continue on flomax 0.4 mg BID (home med)     #GERD  - home med: omeprazole   - continue on protonix     #DM  - f/u HBA1C in AM  - monitor FS AC/HS  - start insulin sliding scale     FULL CODE  PT: IAT  Diet: consistent carb w/ snacks       Please contact me with any questions or concerns at [phone]. Assessment:  83 year old male with PMHx of HTN, HLD, DM, Paroxysmal AFib (refused to take anticoagulation), SVT, BPH, GERD, and prostate CA in remission who presented to the ED with acute onset of dizziness, lightheaded, associated with shortness of breath for 2 days. Pt found to be in Afib with RVR. Pt admitted to cardiac telemetry for further work-up and management.      Problems discussed and associated plan:    #Afib w/ RVR  CHADSVASC score of 4  - Continue on Diltiazem gtt  @ 15 mg/hr, goal HR < 90   - Increase home metoprolol tartrate to 25 mg q6 (from BID), Hold if SBP < 90, HR < 60   - Educated pt the risk/benefits of OAC, will further discuss with patient  - NPO after midnight for possible GANGA/DCCV  - repeat troponin; (troponin 0.2 likely 2/2 to demand ischemia i/s/o AF with RVR)   - f/u HBA1C, TSH, FT4, and lipid profile   - Monitor on telemetry    #HTN  - hold off on enalapril for now, to give room for BP     #HLD  - home med: simvastatin  - c/w atorvastatin 40 mg qd   - f/u lipid panel in AM    #BPH  - continue on flomax 0.4 mg BID (home med)     #GERD  - home med: omeprazole   - continue on protonix     #DM  - f/u HBA1C in AM  - monitor FS AC/HS  - start insulin sliding scale     FULL CODE  PT: IAT  Diet: consistent carb w/ snacks       Please contact me with any questions or concerns at [phone].

## 2023-12-13 NOTE — ED PROVIDER NOTE - OBJECTIVE STATEMENT
osteo  toe
83-year-old male with past medical history of hyperlipidemia, diabetes, hypertension, A-fib not on AC, and remote history of prostate cancer in remission who presents with shortness of breath.  Reports that symptoms started 2 days ago and has been progressively getting worse; states that symptom is more prominent when he is on exertion.  He denies fever, chest pain, nausea, vomiting, abdominal pain, urinary symptoms, and change with bowel movement.

## 2023-12-13 NOTE — ED PROVIDER NOTE - PHYSICAL EXAMINATION
CONSTITUTIONAL: Well-appearing; in no apparent distress.   HEAD: Normocephalic; atraumatic.   EYES: Pupils are round and reactive, extra-ocular muscles are intact. Eyelids are normal in appearance without swelling or lesions.   ENT: Hearing is intact with good acuity to spoken voice.  Patient is speaking clearly, not muffled and airway is intact.   RESPIRATORY: No signs of respiratory distress. Lung sounds are clear in all lobes bilaterally without rales, rhonchi, or wheezes.  CARDIOVASCULAR: irregularly irregular.   GI: Abdomen is soft, non-tender, and without distention. Bowel sounds are present and normoactive in all four quadrants. No masses are noted.   NEURO: A & O x 3. Normal speech. No focal deficit.  PSYCHOLOGICAL: Appropriate mood and affect. Good judgement and insight.

## 2023-12-13 NOTE — H&P ADULT - NSHPLABSRESULTS_GEN_ALL_CORE
- Telemetry: Afib HR  120-130s   - ECG (12/13/23): Afib  with RBBB   - Echo (6/2023):  < from: TTE Echo Complete w/o Contrast w/ Doppler (06.30.23 @ 14:35) >    Summary:   1. LV Ejection Fraction by Nunes's Method with a biplane EF of 58 %.   2. Normal global left ventricular systolic function.   3. Normal left atrial size.   4. Normal right atrial size.    Nuclear stress test   < from: NM Nuclear Stress Pharmacologic Multiple (07.01.23 @ 14:16) >  Impression:  1. NORMAL LEXISCAN / REST MYOCARDIAL PERFUSION TOMOGRAPHY, WITH NO   EVIDENCE FOR ISCHEMIA DURING LEXISCAN INFUSION.  2. NORMAL RESTING LEFT VENTRICULAR WALL MOTION AND WALL THICKENING.  3. LEFT VENTRICULAR EJECTION FRACTION OF  58 % WHICH IS WITHIN RANGE OF   NORMAL.      - Radiology:  CXR (12/13/23):  < from: Xray Chest 1 View- PORTABLE-Urgent (12.13.23 @ 09:22) >    Findings:  Support devices: None. Telemetry leads  Cardiac/mediastinum/hilum: Unremarkable.  Lung parenchyma/Pleura: Within normal limits.  Skeleton/soft tissues: Degenerative changes of both acromioclavicular and   glenohumeral joints degenerative changes of the spine.    Impression:  No radiographic evidence of acute cardiopulmonary disease.    - Labs:                        14.7   7.08  )-----------( 136      ( 13 Dec 2023 09:20 )             42.9     12-13    139  |  105  |  37<H>  ----------------------------<  207<H>  TNP   |  21  |  1.2    Ca    8.9      13 Dec 2023 09:20  Mg     1.8     12-13    TPro  6.4  /  Alb  3.6  /  TBili  0.7  /  DBili  x   /  AST  52<H>  /  ALT  24  /  AlkPhos  73  12-13    LIVER FUNCTIONS - ( 13 Dec 2023 09:20 )  Alb: 3.6 g/dL / Pro: 6.4 g/dL / ALK PHOS: 73 U/L / ALT: 24 U/L / AST: 52 U/L / GGT: x           PT/INR - ( 13 Dec 2023 09:20 )   PT: 11.30 sec;   INR: 0.99 ratio         PTT - ( 13 Dec 2023 09:20 )  PTT:29.6 sec  Troponin T, Serum: 0.20 ng/mL (12-13 @ 09:20)    CARDIAC MARKERS ( 13 Dec 2023 09:20 )  x     / 0.20 ng/mL / x     / x     / x          Lactate Trend    Urinalysis Basic - ( 13 Dec 2023 09:20 )    Color: x / Appearance: x / SG: x / pH: x  Gluc: 207 mg/dL / Ketone: x  / Bili: x / Urobili: x   Blood: x / Protein: x / Nitrite: x   Leuk Esterase: x / RBC: x / WBC x   Sq Epi: x / Non Sq Epi: x / Bacteria: x

## 2023-12-13 NOTE — ED ADULT TRIAGE NOTE - CHIEF COMPLAINT QUOTE
Pt with hx of afib, complaining of SOB and dyspnea on exertion. Pt hypotensive of scene, given 400 ml NS by EMS. EKG done in triage

## 2023-12-13 NOTE — CONSULT NOTE ADULT - SUBJECTIVE AND OBJECTIVE BOX
Outpt cardiologist: Dr Motta    Cardiac HPI:  82 y/o M with PMHx of HTN, HLD, DM, Paroxysmal A Fib (refused to take anticoagulation), SVT, prostate CA in remission presented to the hospital for worsening dyspnea on exertion for the last 2 days. Per pt he started getting dyspnea on exertion without any chest pain, lightheadedness, syncope, LE edema, orthopnea or PND. Denied recent fever, cough, long distance travel. He did not feel any palpitations however his symptoms worsened which prompted ER visit.    In the ER HR was in 150 with SBP in 90s. Pt was given IV fluid bolus and IV cardizem. HR improved to 130s and BP improved to 120/90. CT Angio was done which was negative for pulmonary embolism. EKG showed AFL with variable block RVR and RBBB. Troponin 0.20      PAST MEDICAL & SURGICAL HISTORY  Diabetes mellitus    Hypertension    Paroxysmal atrial fibrillation    Prostate cancer    Status post cervical spinal fusion        FAMILY HISTORY:  FAMILY HISTORY:      SOCIAL HISTORY:  Social History:      ALLERGIES:  No Known Allergies      MEDICATIONS:  aspirin  chewable 243 milliGRAM(s) Oral daily    PRN:      HOME MEDICATIONS:  Home Medications:  aspirin 81 mg oral tablet: 1 tab(s) orally once a day (2023 17:16)  Coenzyme Q10 200 mg oral capsule: 200 tab(s) orally once a day (2023 17:16)  D3 25 mcg (1000 intl units) oral tablet: 1 tab(s) orally once a day (2023 17:16)  enalapril 2.5 mg oral tablet: 1 tab(s) orally once a day (2023 17:16)  Flomax 0.4 mg oral capsule: 1 tab(s) orally 2 times a day (2023 17:16)  glimepiride 1 mg oral tablet: 1 tab(s) orally 2 times a day (2023 17:16)  metFORMIN 1000 mg oral tablet: 1 tab(s) orally 2 times a day (2023 17:16)  metoprolol tartrate 25 mg oral tablet: 1 tab(s) orally 2 times a day (2023 09:41)  Omega-3 Fish Oil 1000 mg oral capsule: 1 tab(s) orally 2 times a day (2023 17:16)  omeprazole 20 mg oral delayed release tablet: 1 tab(s) orally once a day (2023 17:16)  simvastatin 40 mg oral tablet: 1 tab(s) orally once a day (2023 17:16)      VITALS:   T(F): 97.4 ( @ 09:17), Max: 97.4 ( @ 09:17)  HR: 127 ( @ 09:56) (127 - 150)  BP: 98/75 ( @ 11:15) (94/64 - 115/80)  BP(mean): 75 ( @ 09:56) (75 - 82)  RR: 16 ( @ 09:17) (16 - 17)  SpO2: 98% ( @ 09:17) (98% - 98%)    I&O's Summary      REVIEW OF SYSTEMS:  CONSTITUTIONAL: No weakness, fevers or chills  HEENT: No visual changes, neck/ear pain  RESPIRATORY: No cough  CARDIOVASCULAR: See HPI  GASTROINTESTINAL: No abdominal pain. No nausea, vomiting, diarrhea   GENITOURINARY: No dysuria, frequency or hematuria  NEUROLOGICAL: No new focal deficits  SKIN: No new rashes    PHYSICAL EXAM:  General: Not in distress.    HEENT: EOMI  Cardio: irregular, S1, S2  Pulm: B/L BS.    Abdomen: Soft, non-tender, non-distended. Normoactive bowel sounds  Extremities: No edema b/l le  Neuro: A&O x3. No focal deficits    LABS:                        14.7   7.08  )-----------( 136      ( 13 Dec 2023 09:20 )             42.9         139  |  105  |  37<H>  ----------------------------<  207<H>  TNP   |  21  |  1.2    Ca    8.9      13 Dec 2023 09:20  Mg     1.8         TPro  6.4  /  Alb  3.6  /  TBili  0.7  /  DBili  x   /  AST  52<H>  /  ALT  24  /  AlkPhos  73  12    PT/INR - ( 13 Dec 2023 09:20 )   PT: 11.30 sec;   INR: 0.99 ratio         PTT - ( 13 Dec 2023 09:20 )  PTT:29.6 sec  Troponin T, Serum: 0.20 ng/mL *HH* (23 @ 09:20)    CARDIAC MARKERS ( 13 Dec 2023 09:20 )  x     / 0.20 ng/mL / x     / x     / x                RADIOLOGY:  < from: CT Angio Chest PE Protocol w/ IV Cont (23 @ 11:09) >    IMPRESSION:    No evidence of pulmonary embolism.    Additional findings in the body of the report.    --- End of Report ---      < end of copied text >    -TTE: < from: TTE Echo Complete w/o Contrast w/ Doppler (23 @ 14:35) >      Summary:   1. LV Ejection Fraction by Nunes's Method with a biplane EF of 58 %.   2. Normal global left ventricular systolic function.   3. Normal left atrial size.   4. Normal right atrial size.    PHYSICIAN INTERPRETATION:  Left Ventricle: The left ventricular internal cavity size is normal.   Global LV systolic function was normal. Normal segmental left ventricular   systolic function. Normal LV filling pressures.  Right Ventricle: Normal right ventricular size and function.  Left Atrium: Normal left atrial size.  Right Atrium: Normal right atrial size.  Pericardium: There isno evidence of pericardial effusion.  Mitral Valve: The mitral valve is normal in structure. No evidence of   mitral valve stenosis. No evidence of mitral valve regurgitation is seen.  Tricuspid Valve: The tricuspid valve is normal in structure. Trivial   tricuspid regurgitation is visualized.  Aortic Valve: The aortic valve is trileaflet. No evidence of aortic   stenosis. Trivial aortic valve regurgitation is seen.  Pulmonic Valve: The pulmonic valve was not well visualized.  Aorta: The aortic root is normal in size and structure.  Venous: The inferior vena cava was normal sized, with respiratory size   variation less than 50%.    < end of copied text >    -CCTA: NA  -STRESS TEST: < from: NM Nuclear Stress Pharmacologic Multiple (23 @ 14:16) >  Impression:  1. NORMAL LEXISCAN / REST MYOCARDIAL PERFUSION TOMOGRAPHY, WITH NO   EVIDENCE FOR ISCHEMIA DURING LEXISCAN INFUSION.  2. NORMAL RESTING LEFT VENTRICULAR WALL MOTION AND WALL THICKENING.  3. LEFT VENTRICULAR EJECTION FRACTION OF  58 % WHICH IS WITHIN RANGE OF   NORMAL.    --- End of Report ---    < end of copied text >    -CATHETERIZATION: NA  -OTHER:  EC Lead ECG:   Ventricular Rate 135 BPM    Atrial Rate 254 BPM    QRS Duration 124 ms    Q-T Interval 384 ms    QTC Calculation(Bazett) 576 ms    P Axis 103 degrees    R Axis -16 degrees    T Axis 41 degrees    Diagnosis Line Atrial flutter with variable A-V block with premature ventricular or  aberrantly conducted complexes  Right bundle branch block  Abnormal ECG  When compared with ECG of 2023 12:44,  Atrial flutter has replaced Sinus rhythm  Vent. rate has increased BY  70 BPM  Confirmed by BRIDGET BERNARD, ERNESTO ([id number]) on 2023 9:16:32 AM ( @ 08:59)      TELEMETRY EVENTS: AFL RVR   Outpt cardiologist: Dr Motta    Cardiac HPI:  84 y/o M with PMHx of HTN, HLD, DM, Paroxysmal A Fib (refused to take anticoagulation), SVT, prostate CA in remission presented to the hospital for worsening dyspnea on exertion for the last 2 days. Per pt he started getting dyspnea on exertion without any chest pain, lightheadedness, syncope, LE edema, orthopnea or PND. Denied recent fever, cough, long distance travel. He did not feel any palpitations however his symptoms worsened which prompted ER visit.    In the ER HR was in 150 with SBP in 90s. Pt was given IV fluid bolus and IV cardizem. HR improved to 130s and BP improved to 120/90. CT Angio was done which was negative for pulmonary embolism. EKG showed AFL with variable block RVR and RBBB. Troponin 0.20      PAST MEDICAL & SURGICAL HISTORY  Diabetes mellitus    Hypertension    Paroxysmal atrial fibrillation    Prostate cancer    Status post cervical spinal fusion        FAMILY HISTORY:  FAMILY HISTORY:      SOCIAL HISTORY:  Social History:      ALLERGIES:  No Known Allergies      MEDICATIONS:  aspirin  chewable 243 milliGRAM(s) Oral daily    PRN:      HOME MEDICATIONS:  Home Medications:  aspirin 81 mg oral tablet: 1 tab(s) orally once a day (2023 17:16)  Coenzyme Q10 200 mg oral capsule: 200 tab(s) orally once a day (2023 17:16)  D3 25 mcg (1000 intl units) oral tablet: 1 tab(s) orally once a day (2023 17:16)  enalapril 2.5 mg oral tablet: 1 tab(s) orally once a day (2023 17:16)  Flomax 0.4 mg oral capsule: 1 tab(s) orally 2 times a day (2023 17:16)  glimepiride 1 mg oral tablet: 1 tab(s) orally 2 times a day (2023 17:16)  metFORMIN 1000 mg oral tablet: 1 tab(s) orally 2 times a day (2023 17:16)  metoprolol tartrate 25 mg oral tablet: 1 tab(s) orally 2 times a day (2023 09:41)  Omega-3 Fish Oil 1000 mg oral capsule: 1 tab(s) orally 2 times a day (2023 17:16)  omeprazole 20 mg oral delayed release tablet: 1 tab(s) orally once a day (2023 17:16)  simvastatin 40 mg oral tablet: 1 tab(s) orally once a day (2023 17:16)      VITALS:   T(F): 97.4 ( @ 09:17), Max: 97.4 ( @ 09:17)  HR: 127 ( @ 09:56) (127 - 150)  BP: 98/75 ( @ 11:15) (94/64 - 115/80)  BP(mean): 75 ( @ 09:56) (75 - 82)  RR: 16 ( @ 09:17) (16 - 17)  SpO2: 98% ( @ 09:17) (98% - 98%)    I&O's Summary      REVIEW OF SYSTEMS:  CONSTITUTIONAL: No weakness, fevers or chills  HEENT: No visual changes, neck/ear pain  RESPIRATORY: No cough  CARDIOVASCULAR: See HPI  GASTROINTESTINAL: No abdominal pain. No nausea, vomiting, diarrhea   GENITOURINARY: No dysuria, frequency or hematuria  NEUROLOGICAL: No new focal deficits  SKIN: No new rashes    PHYSICAL EXAM:  General: Not in distress.    HEENT: EOMI  Cardio: irregular, S1, S2  Pulm: B/L BS.    Abdomen: Soft, non-tender, non-distended. Normoactive bowel sounds  Extremities: No edema b/l le  Neuro: A&O x3. No focal deficits    LABS:                        14.7   7.08  )-----------( 136      ( 13 Dec 2023 09:20 )             42.9         139  |  105  |  37<H>  ----------------------------<  207<H>  TNP   |  21  |  1.2    Ca    8.9      13 Dec 2023 09:20  Mg     1.8         TPro  6.4  /  Alb  3.6  /  TBili  0.7  /  DBili  x   /  AST  52<H>  /  ALT  24  /  AlkPhos  73  12    PT/INR - ( 13 Dec 2023 09:20 )   PT: 11.30 sec;   INR: 0.99 ratio         PTT - ( 13 Dec 2023 09:20 )  PTT:29.6 sec  Troponin T, Serum: 0.20 ng/mL *HH* (23 @ 09:20)    CARDIAC MARKERS ( 13 Dec 2023 09:20 )  x     / 0.20 ng/mL / x     / x     / x                RADIOLOGY:  < from: CT Angio Chest PE Protocol w/ IV Cont (23 @ 11:09) >    IMPRESSION:    No evidence of pulmonary embolism.    Additional findings in the body of the report.    --- End of Report ---      < end of copied text >    -TTE: < from: TTE Echo Complete w/o Contrast w/ Doppler (23 @ 14:35) >      Summary:   1. LV Ejection Fraction by Nunes's Method with a biplane EF of 58 %.   2. Normal global left ventricular systolic function.   3. Normal left atrial size.   4. Normal right atrial size.    PHYSICIAN INTERPRETATION:  Left Ventricle: The left ventricular internal cavity size is normal.   Global LV systolic function was normal. Normal segmental left ventricular   systolic function. Normal LV filling pressures.  Right Ventricle: Normal right ventricular size and function.  Left Atrium: Normal left atrial size.  Right Atrium: Normal right atrial size.  Pericardium: There isno evidence of pericardial effusion.  Mitral Valve: The mitral valve is normal in structure. No evidence of   mitral valve stenosis. No evidence of mitral valve regurgitation is seen.  Tricuspid Valve: The tricuspid valve is normal in structure. Trivial   tricuspid regurgitation is visualized.  Aortic Valve: The aortic valve is trileaflet. No evidence of aortic   stenosis. Trivial aortic valve regurgitation is seen.  Pulmonic Valve: The pulmonic valve was not well visualized.  Aorta: The aortic root is normal in size and structure.  Venous: The inferior vena cava was normal sized, with respiratory size   variation less than 50%.    < end of copied text >    -CCTA: NA  -STRESS TEST: < from: NM Nuclear Stress Pharmacologic Multiple (23 @ 14:16) >  Impression:  1. NORMAL LEXISCAN / REST MYOCARDIAL PERFUSION TOMOGRAPHY, WITH NO   EVIDENCE FOR ISCHEMIA DURING LEXISCAN INFUSION.  2. NORMAL RESTING LEFT VENTRICULAR WALL MOTION AND WALL THICKENING.  3. LEFT VENTRICULAR EJECTION FRACTION OF  58 % WHICH IS WITHIN RANGE OF   NORMAL.    --- End of Report ---    < end of copied text >    -CATHETERIZATION: NA  -OTHER:  EC Lead ECG:   Ventricular Rate 135 BPM    Atrial Rate 254 BPM    QRS Duration 124 ms    Q-T Interval 384 ms    QTC Calculation(Bazett) 576 ms    P Axis 103 degrees    R Axis -16 degrees    T Axis 41 degrees    Diagnosis Line Atrial flutter with variable A-V block with premature ventricular or  aberrantly conducted complexes  Right bundle branch block  Abnormal ECG  When compared with ECG of 2023 12:44,  Atrial flutter has replaced Sinus rhythm  Vent. rate has increased BY  70 BPM  Confirmed by BRIDGET BERNARD, ERNESTO ([id number]) on 2023 9:16:32 AM ( @ 08:59)      TELEMETRY EVENTS: AFL RVR   Outpt cardiologist: Dr Motta    Cardiac HPI:  84 y/o M with PMHx of HTN, HLD, DM, Paroxysmal A Fib (refused to take anticoagulation), SVT, prostate CA in remission presented to the hospital for worsening dyspnea on exertion for the last 2 days. Per pt he started getting dyspnea on exertion without any chest pain, lightheadedness, syncope, LE edema, orthopnea or PND. Denied recent fever, cough, long distance travel. He did not feel any palpitations however his symptoms worsened which prompted ER visit.    In the ER HR was in 150 with SBP in 90s. Pt was given IV fluid bolus and IV cardizem. HR improved to 130s and BP improved to 120/90. CT Angio was done which was negative for pulmonary embolism. EKG showed AFL with variable block RVR and RBBB. Troponin 0.20      PAST MEDICAL & SURGICAL HISTORY  Diabetes mellitus    Hypertension    Paroxysmal atrial fibrillation    Prostate cancer    Status post cervical spinal fusion        FAMILY HISTORY:  FAMILY HISTORY: no family history of premature CAD or SCD      SOCIAL HISTORY:  Social History: non smoker      ALLERGIES:  No Known Allergies      MEDICATIONS:  aspirin  chewable 243 milliGRAM(s) Oral daily    PRN:      HOME MEDICATIONS:  Home Medications:  aspirin 81 mg oral tablet: 1 tab(s) orally once a day (2023 17:16)  Coenzyme Q10 200 mg oral capsule: 200 tab(s) orally once a day (2023 17:16)  D3 25 mcg (1000 intl units) oral tablet: 1 tab(s) orally once a day (2023 17:16)  enalapril 2.5 mg oral tablet: 1 tab(s) orally once a day (2023 17:16)  Flomax 0.4 mg oral capsule: 1 tab(s) orally 2 times a day (2023 17:16)  glimepiride 1 mg oral tablet: 1 tab(s) orally 2 times a day (2023 17:16)  metFORMIN 1000 mg oral tablet: 1 tab(s) orally 2 times a day (2023 17:16)  metoprolol tartrate 25 mg oral tablet: 1 tab(s) orally 2 times a day (2023 09:41)  Omega-3 Fish Oil 1000 mg oral capsule: 1 tab(s) orally 2 times a day (2023 17:16)  omeprazole 20 mg oral delayed release tablet: 1 tab(s) orally once a day (2023 17:16)  simvastatin 40 mg oral tablet: 1 tab(s) orally once a day (2023 17:16)      VITALS:   T(F): 97.4 ( @ 09:17), Max: 97.4 ( @ 09:17)  HR: 127 ( @ 09:56) (127 - 150)  BP: 98/75 ( @ 11:15) (94/64 - 115/80)  BP(mean): 75 ( @ 09:56) (75 - 82)  RR: 16 ( @ 09:17) (16 - 17)  SpO2: 98% ( @ 09:17) (98% - 98%)    I&O's Summary      REVIEW OF SYSTEMS:  CONSTITUTIONAL: No weakness, fevers or chills  HEENT: No visual changes, neck/ear pain  RESPIRATORY: No cough  CARDIOVASCULAR: See HPI  GASTROINTESTINAL: No abdominal pain. No nausea, vomiting, diarrhea   GENITOURINARY: No dysuria, frequency or hematuria  NEUROLOGICAL: No new focal deficits  SKIN: No new rashes  10 point ROS completed; negative except as stated in HPI    PHYSICAL EXAM:  General: Not in distress, extremely pleasant  HEENT: EOMI, PERRLA  Neck: supple, no JVD  Cardio: irregular, S1, S2  Pulm: B/L BS, no crackles  Abdomen: Soft, non-tender, non-distended. Normoactive bowel sounds  Extremities: No edema b/l le, warm  Neuro: A&O x3. No focal deficits    LABS:                        14.7   7.08  )-----------( 136      ( 13 Dec 2023 09:20 )             42.9         139  |  105  |  37<H>  ----------------------------<  207<H>  TNP   |  21  |  1.2    Ca    8.9      13 Dec 2023 09:20  Mg     1.8     12-    TPro  6.4  /  Alb  3.6  /  TBili  0.7  /  DBili  x   /  AST  52<H>  /  ALT  24  /  AlkPhos  73  12-13    PT/INR - ( 13 Dec 2023 09:20 )   PT: 11.30 sec;   INR: 0.99 ratio         PTT - ( 13 Dec 2023 09:20 )  PTT:29.6 sec  Troponin T, Serum: 0.20 ng/mL *HH* (23 @ 09:20)    CARDIAC MARKERS ( 13 Dec 2023 09:20 )  x     / 0.20 ng/mL / x     / x     / x                RADIOLOGY:  < from: CT Angio Chest PE Protocol w/ IV Cont (23 @ 11:09) >    IMPRESSION:    No evidence of pulmonary embolism.    Additional findings in the body of the report.    --- End of Report ---      < end of copied text >    -TTE: < from: TTE Echo Complete w/o Contrast w/ Doppler (23 @ 14:35) >      Summary:   1. LV Ejection Fraction by Nunes's Method with a biplane EF of 58 %.   2. Normal global left ventricular systolic function.   3. Normal left atrial size.   4. Normal right atrial size.    PHYSICIAN INTERPRETATION:  Left Ventricle: The left ventricular internal cavity size is normal.   Global LV systolic function was normal. Normal segmental left ventricular   systolic function. Normal LV filling pressures.  Right Ventricle: Normal right ventricular size and function.  Left Atrium: Normal left atrial size.  Right Atrium: Normal right atrial size.  Pericardium: There isno evidence of pericardial effusion.  Mitral Valve: The mitral valve is normal in structure. No evidence of   mitral valve stenosis. No evidence of mitral valve regurgitation is seen.  Tricuspid Valve: The tricuspid valve is normal in structure. Trivial   tricuspid regurgitation is visualized.  Aortic Valve: The aortic valve is trileaflet. No evidence of aortic   stenosis. Trivial aortic valve regurgitation is seen.  Pulmonic Valve: The pulmonic valve was not well visualized.  Aorta: The aortic root is normal in size and structure.  Venous: The inferior vena cava was normal sized, with respiratory size   variation less than 50%.    < end of copied text >    -CCTA: NA  -STRESS TEST: < from: NM Nuclear Stress Pharmacologic Multiple (23 @ 14:16) >  Impression:  1. NORMAL LEXISCAN / REST MYOCARDIAL PERFUSION TOMOGRAPHY, WITH NO   EVIDENCE FOR ISCHEMIA DURING LEXISCAN INFUSION.  2. NORMAL RESTING LEFT VENTRICULAR WALL MOTION AND WALL THICKENING.  3. LEFT VENTRICULAR EJECTION FRACTION OF  58 % WHICH IS WITHIN RANGE OF   NORMAL.    --- End of Report ---    < end of copied text >    -CATHETERIZATION: NA  -OTHER:  EC Lead ECG:   Ventricular Rate 135 BPM    Atrial Rate 254 BPM    QRS Duration 124 ms    Q-T Interval 384 ms    QTC Calculation(Bazett) 576 ms    P Axis 103 degrees    R Axis -16 degrees    T Axis 41 degrees    Diagnosis Line Atrial flutter with variable A-V block with premature ventricular or  aberrantly conducted complexes  Right bundle branch block  Abnormal ECG  When compared with ECG of 2023 12:44,  Atrial flutter has replaced Sinus rhythm  Vent. rate has increased BY  70 BPM  Confirmed by BRIDGET BERNARD, ERNESTO ([id number]) on 2023 9:16:32 AM ( @ 08:59)      TELEMETRY EVENTS: AFL RVR   Outpt cardiologist: Dr Motta    Cardiac HPI:  82 y/o M with PMHx of HTN, HLD, DM, Paroxysmal A Fib (refused to take anticoagulation), SVT, prostate CA in remission presented to the hospital for worsening dyspnea on exertion for the last 2 days. Per pt he started getting dyspnea on exertion without any chest pain, lightheadedness, syncope, LE edema, orthopnea or PND. Denied recent fever, cough, long distance travel. He did not feel any palpitations however his symptoms worsened which prompted ER visit.    In the ER HR was in 150 with SBP in 90s. Pt was given IV fluid bolus and IV cardizem. HR improved to 130s and BP improved to 120/90. CT Angio was done which was negative for pulmonary embolism. EKG showed AFL with variable block RVR and RBBB. Troponin 0.20      PAST MEDICAL & SURGICAL HISTORY  Diabetes mellitus    Hypertension    Paroxysmal atrial fibrillation    Prostate cancer    Status post cervical spinal fusion        FAMILY HISTORY:  FAMILY HISTORY: no family history of premature CAD or SCD      SOCIAL HISTORY:  Social History: non smoker      ALLERGIES:  No Known Allergies      MEDICATIONS:  aspirin  chewable 243 milliGRAM(s) Oral daily    PRN:      HOME MEDICATIONS:  Home Medications:  aspirin 81 mg oral tablet: 1 tab(s) orally once a day (2023 17:16)  Coenzyme Q10 200 mg oral capsule: 200 tab(s) orally once a day (2023 17:16)  D3 25 mcg (1000 intl units) oral tablet: 1 tab(s) orally once a day (2023 17:16)  enalapril 2.5 mg oral tablet: 1 tab(s) orally once a day (2023 17:16)  Flomax 0.4 mg oral capsule: 1 tab(s) orally 2 times a day (2023 17:16)  glimepiride 1 mg oral tablet: 1 tab(s) orally 2 times a day (2023 17:16)  metFORMIN 1000 mg oral tablet: 1 tab(s) orally 2 times a day (2023 17:16)  metoprolol tartrate 25 mg oral tablet: 1 tab(s) orally 2 times a day (2023 09:41)  Omega-3 Fish Oil 1000 mg oral capsule: 1 tab(s) orally 2 times a day (2023 17:16)  omeprazole 20 mg oral delayed release tablet: 1 tab(s) orally once a day (2023 17:16)  simvastatin 40 mg oral tablet: 1 tab(s) orally once a day (2023 17:16)      VITALS:   T(F): 97.4 ( @ 09:17), Max: 97.4 ( @ 09:17)  HR: 127 ( @ 09:56) (127 - 150)  BP: 98/75 ( @ 11:15) (94/64 - 115/80)  BP(mean): 75 ( @ 09:56) (75 - 82)  RR: 16 ( @ 09:17) (16 - 17)  SpO2: 98% ( @ 09:17) (98% - 98%)    I&O's Summary      REVIEW OF SYSTEMS:  CONSTITUTIONAL: No weakness, fevers or chills  HEENT: No visual changes, neck/ear pain  RESPIRATORY: No cough  CARDIOVASCULAR: See HPI  GASTROINTESTINAL: No abdominal pain. No nausea, vomiting, diarrhea   GENITOURINARY: No dysuria, frequency or hematuria  NEUROLOGICAL: No new focal deficits  SKIN: No new rashes  10 point ROS completed; negative except as stated in HPI    PHYSICAL EXAM:  General: Not in distress, extremely pleasant  HEENT: EOMI, PERRLA  Neck: supple, no JVD  Cardio: irregular, S1, S2  Pulm: B/L BS, no crackles  Abdomen: Soft, non-tender, non-distended. Normoactive bowel sounds  Extremities: No edema b/l le, warm  Neuro: A&O x3. No focal deficits    LABS:                        14.7   7.08  )-----------( 136      ( 13 Dec 2023 09:20 )             42.9         139  |  105  |  37<H>  ----------------------------<  207<H>  TNP   |  21  |  1.2    Ca    8.9      13 Dec 2023 09:20  Mg     1.8     12-    TPro  6.4  /  Alb  3.6  /  TBili  0.7  /  DBili  x   /  AST  52<H>  /  ALT  24  /  AlkPhos  73  12-13    PT/INR - ( 13 Dec 2023 09:20 )   PT: 11.30 sec;   INR: 0.99 ratio         PTT - ( 13 Dec 2023 09:20 )  PTT:29.6 sec  Troponin T, Serum: 0.20 ng/mL *HH* (23 @ 09:20)    CARDIAC MARKERS ( 13 Dec 2023 09:20 )  x     / 0.20 ng/mL / x     / x     / x                RADIOLOGY:  < from: CT Angio Chest PE Protocol w/ IV Cont (23 @ 11:09) >    IMPRESSION:    No evidence of pulmonary embolism.    Additional findings in the body of the report.    --- End of Report ---      < end of copied text >    -TTE: < from: TTE Echo Complete w/o Contrast w/ Doppler (23 @ 14:35) >      Summary:   1. LV Ejection Fraction by Nunes's Method with a biplane EF of 58 %.   2. Normal global left ventricular systolic function.   3. Normal left atrial size.   4. Normal right atrial size.    PHYSICIAN INTERPRETATION:  Left Ventricle: The left ventricular internal cavity size is normal.   Global LV systolic function was normal. Normal segmental left ventricular   systolic function. Normal LV filling pressures.  Right Ventricle: Normal right ventricular size and function.  Left Atrium: Normal left atrial size.  Right Atrium: Normal right atrial size.  Pericardium: There isno evidence of pericardial effusion.  Mitral Valve: The mitral valve is normal in structure. No evidence of   mitral valve stenosis. No evidence of mitral valve regurgitation is seen.  Tricuspid Valve: The tricuspid valve is normal in structure. Trivial   tricuspid regurgitation is visualized.  Aortic Valve: The aortic valve is trileaflet. No evidence of aortic   stenosis. Trivial aortic valve regurgitation is seen.  Pulmonic Valve: The pulmonic valve was not well visualized.  Aorta: The aortic root is normal in size and structure.  Venous: The inferior vena cava was normal sized, with respiratory size   variation less than 50%.    < end of copied text >    -CCTA: NA  -STRESS TEST: < from: NM Nuclear Stress Pharmacologic Multiple (23 @ 14:16) >  Impression:  1. NORMAL LEXISCAN / REST MYOCARDIAL PERFUSION TOMOGRAPHY, WITH NO   EVIDENCE FOR ISCHEMIA DURING LEXISCAN INFUSION.  2. NORMAL RESTING LEFT VENTRICULAR WALL MOTION AND WALL THICKENING.  3. LEFT VENTRICULAR EJECTION FRACTION OF  58 % WHICH IS WITHIN RANGE OF   NORMAL.    --- End of Report ---    < end of copied text >    -CATHETERIZATION: NA  -OTHER:  EC Lead ECG:   Ventricular Rate 135 BPM    Atrial Rate 254 BPM    QRS Duration 124 ms    Q-T Interval 384 ms    QTC Calculation(Bazett) 576 ms    P Axis 103 degrees    R Axis -16 degrees    T Axis 41 degrees    Diagnosis Line Atrial flutter with variable A-V block with premature ventricular or  aberrantly conducted complexes  Right bundle branch block  Abnormal ECG  When compared with ECG of 2023 12:44,  Atrial flutter has replaced Sinus rhythm  Vent. rate has increased BY  70 BPM  Confirmed by BRIDGET BERNARD, ERNESTO ([id number]) on 2023 9:16:32 AM ( @ 08:59)      TELEMETRY EVENTS: AFL RVR

## 2023-12-13 NOTE — CONSULT NOTE ADULT - ASSESSMENT
84 y/o M with PMHx of HTN, HLD, DM, Paroxysmal A Fib (refused to take anticoagulation), SVT, prostate CA in remission presented to the hospital for worsening dyspnea on exertion for the last 2 days. Per pt he started getting dyspnea on exertion without any chest pain, lightheadedness, syncope, LE edema, orthopnea or PND. Denied recent fever, cough, long distance travel. He did not feel any palpitations however his symptoms worsened which prompted ER visit.    In the ER HR was in 150 with SBP in 90s. Pt was given IV fluid bolus and IV cardizem. HR improved to 130s and BP improved to 120/90. CT Angio was done which was negative for pulmonary embolism. EKG showed AFL with variable block RVR and RBBB. Troponin 0.20    IMPRESSION  A Flutter with RVR  elevated CE likely type II NSTEMI in the setting of A FIb with RVR  HTN, HLD  H/O SVT refused ILR  Prostate CA in remission    RECOMMENDATIONS  check serial EKGs and trend Fabrizio until they start trending down  c/w aspirin and statins  start cardizem drip for rate control  c/w PO lopressor 25 BID  can use digoxin if unable to tolerate cardizem drip due to low BP.   CHADSVASC at least 4. Refusing anticoagulation. States that his friends have had spontaneous bleeding and skin bruising with AC. Understands the risk of stroke. Also refusing any possible intervention for left atrial appendage occlusion device.  hold home dose enalapril for now, resume when BP stable  of note pt had hospital admission for similar episode back in June 2023 for A Fib and Type II NSTEMI. W/up including NST was negative at the time  outpatient fu with primary cardiologist when HR stable   84 y/o M with PMHx of HTN, HLD, DM, Paroxysmal A Fib (refused to take anticoagulation), SVT, prostate CA in remission presented to the hospital for worsening dyspnea on exertion for the last 2 days. Per pt he started getting dyspnea on exertion without any chest pain, lightheadedness, syncope, LE edema, orthopnea or PND. Denied recent fever, cough, long distance travel. He did not feel any palpitations however his symptoms worsened which prompted ER visit.    In the ER HR was in 150 with SBP in 90s. Pt was given IV fluid bolus and IV cardizem. HR improved to 130s and BP improved to 120/90. CT Angio was done which was negative for pulmonary embolism. EKG showed AFL with variable block RVR and RBBB. Troponin 0.20. POCUS showed preserved LVSF no WMAs, IVC non dilated collapsing.     IMPRESSION  A Flutter with RVR  elevated CE likely type II NSTEMI in the setting of A FIb with RVR  HTN, HLD  H/O SVT refused ILR  Prostate CA in remission    RECOMMENDATIONS  check TSH  check serial EKGs and trend Fabrizio until they start trending down  check TTE  c/w aspirin and statins  start cardizem drip for rate control  c/w PO lopressor 25 BID  can use digoxin if unable to tolerate cardizem drip due to low BP.   CHADSVASC at least 4. Refusing anticoagulation. States that his friends have had spontaneous bleeding and skin bruising with AC. Understands the risk of stroke. Also refusing any possible intervention for left atrial appendage occlusion device.  hold home dose enalapril for now, resume when BP stable  of note pt had hospital admission for similar episode back in June 2023 for A Fib and Type II NSTEMI. W/up including NST was negative at the time  outpatient fu with primary cardiologist when HR stable   82 y/o M with PMHx of HTN, HLD, DM, Paroxysmal A Fib (refused to take anticoagulation), SVT, prostate CA in remission presented to the hospital for worsening dyspnea on exertion for the last 2 days. Per pt he started getting dyspnea on exertion without any chest pain, lightheadedness, syncope, LE edema, orthopnea or PND. Denied recent fever, cough, long distance travel. He did not feel any palpitations however his symptoms worsened which prompted ER visit.    In the ER HR was in 150 with SBP in 90s. Pt was given IV fluid bolus and IV cardizem. HR improved to 130s and BP improved to 120/90. CT Angio was done which was negative for pulmonary embolism. EKG showed AFL with variable block RVR and RBBB. Troponin 0.20. POCUS showed preserved LVSF no WMAs, IVC non dilated collapsing.     IMPRESSION  A Flutter with RVR  elevated CE likely type II NSTEMI in the setting of A FIb with RVR  HTN, HLD  H/O SVT refused ILR  Prostate CA in remission    RECOMMENDATIONS  check TSH  check serial EKGs and trend Fabrizio until they start trending down  check TTE  c/w aspirin and statins  start cardizem drip for rate control  c/w PO lopressor 25 BID  can use digoxin if unable to tolerate cardizem drip due to low BP.   CHADSVASC at least 4. Refusing anticoagulation. States that his friends have had spontaneous bleeding and skin bruising with AC. Understands the risk of stroke. Also refusing any possible intervention for left atrial appendage occlusion device.  hold home dose enalapril for now, resume when BP stable  of note pt had hospital admission for similar episode back in June 2023 for A Fib and Type II NSTEMI. W/up including NST was negative at the time  outpatient fu with primary cardiologist when HR stable   84 y/o M with PMHx of HTN, HLD, DM, Paroxysmal A Fib (refused to take anticoagulation), SVT, prostate CA in remission presented to the hospital for worsening dyspnea on exertion for the last 2 days. Per pt he started getting dyspnea on exertion without any chest pain, lightheadedness, syncope, LE edema, orthopnea or PND. Denied recent fever, cough, long distance travel. He did not feel any palpitations however his symptoms worsened which prompted ER visit.    In the ER HR was in 150 with SBP in 90s. Pt was given IV fluid bolus and IV cardizem. HR improved to 130s and BP improved to 120/90. CT Angio was done which was negative for pulmonary embolism. EKG showed AFL with variable block RVR and RBBB. Troponin 0.20. POCUS showed preserved LVSF no WMAs, IVC non dilated collapsing.     IMPRESSION  A Flutter with RVR  elevated CE likely type II NSTEMI in the setting of Aflutter with RVR  HTN, HLD  H/O SVT refused ILR  Prostate CA in remission    RECOMMENDATIONS  check TSH  check serial EKGs and trend Fabrizio until they start trending down  check TTE  c/w aspirin and statins  start cardizem drip for rate control  c/w PO lopressor 25 BID  CHADSVASC at least 4. Refusing anticoagulation. States that his friends have had spontaneous bleeding and skin bruising with AC. Understands the risk of stroke. Also refusing any possible intervention for left atrial appendage occlusion device.  hold home dose enalapril for now, resume when BP stable  of note pt had hospital admission for similar episode back in June 2023 for A Fib and Type II NSTEMI. W/up including NST was negative at the time  outpatient fu with primary cardiologist when HR stable

## 2023-12-13 NOTE — ED PROVIDER NOTE - PROGRESS NOTE DETAILS
Spoke with cardio tele who recommended diltiazem drip and admit to cardio tele. Spoke with cardio tele and accepted the patient. Patient is admitted for afib with rvr. Pt is aware of the plan and agrees. Pt has been endorsed to MAR.

## 2023-12-14 ENCOUNTER — TRANSCRIPTION ENCOUNTER (OUTPATIENT)
Age: 83
End: 2023-12-14

## 2023-12-14 LAB
A1C WITH ESTIMATED AVERAGE GLUCOSE RESULT: 7.7 % — HIGH (ref 4–5.6)
A1C WITH ESTIMATED AVERAGE GLUCOSE RESULT: 7.7 % — HIGH (ref 4–5.6)
ANION GAP SERPL CALC-SCNC: 11 MMOL/L — SIGNIFICANT CHANGE UP (ref 7–14)
ANION GAP SERPL CALC-SCNC: 11 MMOL/L — SIGNIFICANT CHANGE UP (ref 7–14)
BUN SERPL-MCNC: 27 MG/DL — HIGH (ref 10–20)
BUN SERPL-MCNC: 27 MG/DL — HIGH (ref 10–20)
CALCIUM SERPL-MCNC: 8.4 MG/DL — SIGNIFICANT CHANGE UP (ref 8.4–10.5)
CALCIUM SERPL-MCNC: 8.4 MG/DL — SIGNIFICANT CHANGE UP (ref 8.4–10.5)
CHLORIDE SERPL-SCNC: 106 MMOL/L — SIGNIFICANT CHANGE UP (ref 98–110)
CHLORIDE SERPL-SCNC: 106 MMOL/L — SIGNIFICANT CHANGE UP (ref 98–110)
CO2 SERPL-SCNC: 19 MMOL/L — SIGNIFICANT CHANGE UP (ref 17–32)
CO2 SERPL-SCNC: 19 MMOL/L — SIGNIFICANT CHANGE UP (ref 17–32)
CREAT SERPL-MCNC: 0.8 MG/DL — SIGNIFICANT CHANGE UP (ref 0.7–1.5)
CREAT SERPL-MCNC: 0.8 MG/DL — SIGNIFICANT CHANGE UP (ref 0.7–1.5)
EGFR: 88 ML/MIN/1.73M2 — SIGNIFICANT CHANGE UP
EGFR: 88 ML/MIN/1.73M2 — SIGNIFICANT CHANGE UP
ESTIMATED AVERAGE GLUCOSE: 174 MG/DL — HIGH (ref 68–114)
ESTIMATED AVERAGE GLUCOSE: 174 MG/DL — HIGH (ref 68–114)
GLUCOSE BLDC GLUCOMTR-MCNC: 166 MG/DL — HIGH (ref 70–99)
GLUCOSE BLDC GLUCOMTR-MCNC: 166 MG/DL — HIGH (ref 70–99)
GLUCOSE BLDC GLUCOMTR-MCNC: 184 MG/DL — HIGH (ref 70–99)
GLUCOSE BLDC GLUCOMTR-MCNC: 184 MG/DL — HIGH (ref 70–99)
GLUCOSE BLDC GLUCOMTR-MCNC: 185 MG/DL — HIGH (ref 70–99)
GLUCOSE BLDC GLUCOMTR-MCNC: 185 MG/DL — HIGH (ref 70–99)
GLUCOSE BLDC GLUCOMTR-MCNC: 193 MG/DL — HIGH (ref 70–99)
GLUCOSE BLDC GLUCOMTR-MCNC: 193 MG/DL — HIGH (ref 70–99)
GLUCOSE SERPL-MCNC: 202 MG/DL — HIGH (ref 70–99)
GLUCOSE SERPL-MCNC: 202 MG/DL — HIGH (ref 70–99)
HCT VFR BLD CALC: 36 % — LOW (ref 42–52)
HCT VFR BLD CALC: 36 % — LOW (ref 42–52)
HGB BLD-MCNC: 12.5 G/DL — LOW (ref 14–18)
HGB BLD-MCNC: 12.5 G/DL — LOW (ref 14–18)
MAGNESIUM SERPL-MCNC: 1.8 MG/DL — SIGNIFICANT CHANGE UP (ref 1.8–2.4)
MAGNESIUM SERPL-MCNC: 1.8 MG/DL — SIGNIFICANT CHANGE UP (ref 1.8–2.4)
MCHC RBC-ENTMCNC: 31.3 PG — HIGH (ref 27–31)
MCHC RBC-ENTMCNC: 31.3 PG — HIGH (ref 27–31)
MCHC RBC-ENTMCNC: 34.7 G/DL — SIGNIFICANT CHANGE UP (ref 32–37)
MCHC RBC-ENTMCNC: 34.7 G/DL — SIGNIFICANT CHANGE UP (ref 32–37)
MCV RBC AUTO: 90.2 FL — SIGNIFICANT CHANGE UP (ref 80–94)
MCV RBC AUTO: 90.2 FL — SIGNIFICANT CHANGE UP (ref 80–94)
NRBC # BLD: 0 /100 WBCS — SIGNIFICANT CHANGE UP (ref 0–0)
NRBC # BLD: 0 /100 WBCS — SIGNIFICANT CHANGE UP (ref 0–0)
PLATELET # BLD AUTO: 133 K/UL — SIGNIFICANT CHANGE UP (ref 130–400)
PLATELET # BLD AUTO: 133 K/UL — SIGNIFICANT CHANGE UP (ref 130–400)
PMV BLD: 11.8 FL — HIGH (ref 7.4–10.4)
PMV BLD: 11.8 FL — HIGH (ref 7.4–10.4)
POTASSIUM SERPL-MCNC: 4.2 MMOL/L — SIGNIFICANT CHANGE UP (ref 3.5–5)
POTASSIUM SERPL-MCNC: 4.2 MMOL/L — SIGNIFICANT CHANGE UP (ref 3.5–5)
POTASSIUM SERPL-SCNC: 4.2 MMOL/L — SIGNIFICANT CHANGE UP (ref 3.5–5)
POTASSIUM SERPL-SCNC: 4.2 MMOL/L — SIGNIFICANT CHANGE UP (ref 3.5–5)
RBC # BLD: 3.99 M/UL — LOW (ref 4.7–6.1)
RBC # BLD: 3.99 M/UL — LOW (ref 4.7–6.1)
RBC # FLD: 12.9 % — SIGNIFICANT CHANGE UP (ref 11.5–14.5)
RBC # FLD: 12.9 % — SIGNIFICANT CHANGE UP (ref 11.5–14.5)
SODIUM SERPL-SCNC: 136 MMOL/L — SIGNIFICANT CHANGE UP (ref 135–146)
SODIUM SERPL-SCNC: 136 MMOL/L — SIGNIFICANT CHANGE UP (ref 135–146)
T4 FREE SERPL-MCNC: 1.5 NG/DL — SIGNIFICANT CHANGE UP (ref 0.9–1.8)
T4 FREE SERPL-MCNC: 1.5 NG/DL — SIGNIFICANT CHANGE UP (ref 0.9–1.8)
TROPONIN T SERPL-MCNC: 0.2 NG/ML — CRITICAL HIGH
TROPONIN T SERPL-MCNC: 0.2 NG/ML — CRITICAL HIGH
TSH SERPL-MCNC: 1.45 UIU/ML — SIGNIFICANT CHANGE UP (ref 0.27–4.2)
TSH SERPL-MCNC: 1.45 UIU/ML — SIGNIFICANT CHANGE UP (ref 0.27–4.2)
WBC # BLD: 6.39 K/UL — SIGNIFICANT CHANGE UP (ref 4.8–10.8)
WBC # BLD: 6.39 K/UL — SIGNIFICANT CHANGE UP (ref 4.8–10.8)
WBC # FLD AUTO: 6.39 K/UL — SIGNIFICANT CHANGE UP (ref 4.8–10.8)
WBC # FLD AUTO: 6.39 K/UL — SIGNIFICANT CHANGE UP (ref 4.8–10.8)

## 2023-12-14 PROCEDURE — 93325 DOPPLER ECHO COLOR FLOW MAPG: CPT | Mod: 26

## 2023-12-14 PROCEDURE — 93320 DOPPLER ECHO COMPLETE: CPT | Mod: 26

## 2023-12-14 PROCEDURE — 92960 CARDIOVERSION ELECTRIC EXT: CPT

## 2023-12-14 PROCEDURE — 93312 ECHO TRANSESOPHAGEAL: CPT | Mod: 26,XU

## 2023-12-14 PROCEDURE — 99233 SBSQ HOSP IP/OBS HIGH 50: CPT

## 2023-12-14 PROCEDURE — 99358 PROLONG SERVICE W/O CONTACT: CPT | Mod: NC

## 2023-12-14 PROCEDURE — 99223 1ST HOSP IP/OBS HIGH 75: CPT

## 2023-12-14 PROCEDURE — 93010 ELECTROCARDIOGRAM REPORT: CPT

## 2023-12-14 RX ORDER — METOPROLOL TARTRATE 50 MG
2.5 TABLET ORAL ONCE
Refills: 0 | Status: COMPLETED | OUTPATIENT
Start: 2023-12-14 | End: 2023-12-14

## 2023-12-14 RX ADMIN — PANTOPRAZOLE SODIUM 40 MILLIGRAM(S): 20 TABLET, DELAYED RELEASE ORAL at 06:48

## 2023-12-14 RX ADMIN — TAMSULOSIN HYDROCHLORIDE 0.4 MILLIGRAM(S): 0.4 CAPSULE ORAL at 06:48

## 2023-12-14 RX ADMIN — ATORVASTATIN CALCIUM 40 MILLIGRAM(S): 80 TABLET, FILM COATED ORAL at 21:12

## 2023-12-14 RX ADMIN — Medication 25 MILLIGRAM(S): at 11:31

## 2023-12-14 RX ADMIN — Medication 1: at 17:56

## 2023-12-14 RX ADMIN — APIXABAN 5 MILLIGRAM(S): 2.5 TABLET, FILM COATED ORAL at 17:50

## 2023-12-14 RX ADMIN — Medication 25 MILLIGRAM(S): at 17:49

## 2023-12-14 RX ADMIN — TAMSULOSIN HYDROCHLORIDE 0.4 MILLIGRAM(S): 0.4 CAPSULE ORAL at 17:56

## 2023-12-14 RX ADMIN — APIXABAN 5 MILLIGRAM(S): 2.5 TABLET, FILM COATED ORAL at 06:48

## 2023-12-14 RX ADMIN — Medication 25 MILLIGRAM(S): at 04:04

## 2023-12-14 RX ADMIN — Medication 25 MILLIGRAM(S): at 21:12

## 2023-12-14 NOTE — DISCHARGE NOTE PROVIDER - NSDCFUSCHEDAPPT_GEN_ALL_CORE_FT
Sarika Mcintosh  [facility] PreAdmits  Scheduled Appointment: 01/04/2024    [facility]  [address]  Scheduled Appointment: 01/04/2024     Sarika Mcintosh  [facility]  Scheduled Appointment: 01/04/2024    [facility]  [address]  Scheduled Appointment: 01/04/2024

## 2023-12-14 NOTE — DISCHARGE NOTE PROVIDER - NSDCMRMEDTOKEN_GEN_ALL_CORE_FT
aspirin 81 mg oral tablet: 1 tab(s) orally once a day  Coenzyme Q10 200 mg oral capsule: 1 tab(s) orally once a day  enalapril 2.5 mg oral tablet: 1 tab(s) orally once a day  Flomax 0.4 mg oral capsule: 1 tab(s) orally 2 times a day  glimepiride 1 mg oral tablet: 1 tab(s) orally 2 times a day  metFORMIN 1000 mg oral tablet: 1 tab(s) orally 2 times a day  metoprolol tartrate 25 mg oral tablet: 1 tab(s) orally 2 times a day  Omega-3 Fish Oil 1000 mg oral capsule: 1 tab(s) orally 2 times a day  omeprazole 20 mg oral delayed release tablet: 1 tab(s) orally once a day  simvastatin 40 mg oral tablet: 1 tab(s) orally once a day   apixaban 5 mg oral tablet: 1 tab(s) orally every 12 hours  aspirin 81 mg oral tablet: 1 tab(s) orally once a day  Coenzyme Q10 200 mg oral capsule: 1 tab(s) orally once a day  enalapril 2.5 mg oral tablet: 1 tab(s) orally once a day  Flomax 0.4 mg oral capsule: 1 tab(s) orally 2 times a day  glimepiride 1 mg oral tablet: 1 tab(s) orally 2 times a day  metFORMIN 1000 mg oral tablet: 1 tab(s) orally 2 times a day  metoprolol tartrate 25 mg oral tablet: 1 tab(s) orally every 6 hours  Omega-3 Fish Oil 1000 mg oral capsule: 1 tab(s) orally 2 times a day  omeprazole 20 mg oral delayed release tablet: 1 tab(s) orally once a day  simvastatin 40 mg oral tablet: 1 tab(s) orally once a day   apixaban 5 mg oral tablet: 1 tab(s) orally every 12 hours  Coenzyme Q10 200 mg oral capsule: 1 tab(s) orally once a day  dronedarone 400 mg oral tablet: 1 tab(s) orally 2 times a day  Flomax 0.4 mg oral capsule: 1 tab(s) orally 2 times a day  glimepiride 1 mg oral tablet: 1 tab(s) orally 2 times a day  metFORMIN 1000 mg oral tablet: 1 tab(s) orally 2 times a day  metoprolol tartrate 25 mg oral tablet: 1 tab(s) orally every 6 hours  Omega-3 Fish Oil 1000 mg oral capsule: 1 tab(s) orally 2 times a day  omeprazole 20 mg oral delayed release tablet: 1 tab(s) orally once a day  simvastatin 40 mg oral tablet: 1 tab(s) orally once a day   apixaban 5 mg oral tablet: 1 tab(s) orally every 12 hours  Coenzyme Q10 200 mg oral capsule: 1 tab(s) orally once a day  dronedarone 400 mg oral tablet: 1 tab(s) orally 2 times a day  Flomax 0.4 mg oral capsule: 1 tab(s) orally 2 times a day  glimepiride 1 mg oral tablet: 1 tab(s) orally 2 times a day  metFORMIN 1000 mg oral tablet: 1 tab(s) orally 2 times a day  metoprolol tartrate 25 mg oral tablet: 1 tab(s) orally 2 times a day  Omega-3 Fish Oil 1000 mg oral capsule: 1 tab(s) orally 2 times a day  omeprazole 20 mg oral delayed release tablet: 1 tab(s) orally once a day  simvastatin 40 mg oral tablet: 1 tab(s) orally once a day

## 2023-12-14 NOTE — PROGRESS NOTE ADULT - ASSESSMENT
Assessment:  83 year old male with PMHx of HTN, HLD, DM, Paroxysmal AFib (refused to take anticoagulation), SVT, BPH, GERD, and prostate CA in remission who presented to the ED with acute onset of dizziness, lightheaded, associated with shortness of breath for 2 days. Pt found to be in Afib with RVR. Pt admitted to cardiac telemetry for further work-up and management.      Problems discussed and associated plan:    #Afib w/ RVR  CHADSVASC score of 4  - Increased home metoprolol tartrate to 25 mg q6 (from BID), Hold if SBP < 90, HR < 60   - Educated pt the risk/benefits of OAC, will further discuss with patient, pt agrees to take Eliquis , started on Eliquis 5 mg bid   - s/p IV Diltiazem d/c'd as BP became soft   - NPO after midnight for  GANGA/DCCV today   - Trop 0.20->0.18-->0.20 ( likely 2/2 to demand ischemia i/s/o AF with RVR)   - Lexiscan 12/14 negative for ischemia   - f/u HBA1C, TSH, FT4- P  - BNP 4362   - AST on adm 52, likely hepatic congestion in the setting of afib w RVR, repeat LFTs tomorrow   - EP c/s called (pt known to Dr. Joaquin)-->for opinion on initiation about Sotalol s/p GANGA/CV  - pt somewhat agreeable to ablation, but not sure   - consider TTE if warranted       #HTN  - hold off on enalapril for now, to give room for BP     #HLD  - home med: simvastatin  - c/w atorvastatin 40 mg qd   - LDL 56 , Tri 259    #BPH  - continue on flomax 0.4 mg BID (home med)     #GERD  - home med: omeprazole   - continue on protonix     #DM  - f/u HBA1C in AM- Pending   - monitor FS AC/HS  - started insulin sliding scale     FULL CODE  PT: IAT  Diet: consistent carb w/ snacks       Please contact me with any questions or concerns at [phone].                 Assessment:  83 year old male with PMHx of HTN, HLD, DM, Paroxysmal AFib (refused to take anticoagulation), SVT, BPH, GERD, and prostate CA in remission who presented to the ED with acute onset of dizziness, lightheaded, associated with shortness of breath for 2 days. Pt found to be in Afib with RVR. Pt admitted to cardiac telemetry for further work-up and management.      Problems discussed and associated plan:    #Afib w/ RVR  CHADSVASC score of 4  - Increased home metoprolol tartrate to 25 mg q6 (from BID), Hold if SBP < 90, HR < 60   - Educated pt the risk/benefits of OAC, will further discuss with patient, pt agrees to take Eliquis , started on Eliquis 5 mg bid   - s/p IV Diltiazem d/c'd as BP became soft   - NPO after midnight for  GANGA/DCCV today   - Trop 0.20->0.18-->0.20 ( likely 2/2 to demand ischemia i/s/o AF with RVR)   - Lexiscan 12/14 negative for ischemia   - f/u HBA1C, TSH, FT4- P  - BNP 4362   - AST on adm 52, likely hepatic congestion in the setting of afib w RVR, repeat LFTs tomorrow   - EP c/s called (pt known to Dr. Joaquin)-->for opinion on initiation about Sotalol s/p GANGA/CV  - pt somewhat agreeable to ablation, but not sure   - consider TTE if warranted       #HTN  - hold off on enalapril for now, to give room for BP     #HLD  - home med: simvastatin  - c/w atorvastatin 40 mg qd   - LDL 56 , Tri 259    #BPH  - continue on flomax 0.4 mg BID (home med)     #GERD  - home med: omeprazole   - continue on protonix     #DM  - f/u HBA1C in AM- Pending   - monitor FS AC/HS  - started insulin sliding scale     FULL CODE  PT: IAT  Diet: consistent carb w/ snacks       Please contact me with any questions or concerns at [phone].

## 2023-12-14 NOTE — DISCHARGE NOTE PROVIDER - CARE PROVIDERS DIRECT ADDRESSES
,DirectAddress_Unknown ,DirectAddress_Unknown,[email or web address] ,[email or web address],[email or web address]

## 2023-12-14 NOTE — PROGRESS NOTE ADULT - SUBJECTIVE AND OBJECTIVE BOX
Chief complaint: Patient is a 83y old  Male who presents with a chief complaint of Afib with RVR (14 Dec 2023 12:39)    Interval history:    Review of systems: A complete 10-point review of systems was obtained and is negative except as stated in the interval history.    Vitals:  T(F): 98.1, Max: 98.2 (12-14 @ 02:00)  HR: 128 (63 - 136)  BP: 114/81 (87/58 - 127/85)  RR: 21 (12 - 21)  SpO2: 96% (94% - 99%)    Ins & outs:     12-13 @ 07:01  -  12-14 @ 07:00  --------------------------------------------------------  IN: 545 mL / OUT: 1550 mL / NET: -1005 mL    12-14 @ 07:01  -  12-14 @ 13:10  --------------------------------------------------------  IN: 0 mL / OUT: 275 mL / NET: -275 mL      Weight trend:  Weight (kg): 92.2 (12-13)    Physical exam:  General: No apparent distress  HEENT: Anicteric sclera. Moist mucous membranes. JVP *** cm.   Cardiac: Regular rate and rhythm. No murmurs, rubs, or gallops.   Vascular: Symmetric radial pulses. Dorsalis pedis pulses palpable.   Respiratory: Normal effort. Bibasilar crackles. Clear to ascultation.   Abdomen: Soft, nontender. Audible bowel sounds.   Extremities: Warm with *** edema. No cyanosis or clubbing.   Skin: Warm and dry. No rash.   Neurologic: Grossly normal motor function.   Psychiatric: Oriented to person, place, and time.     Data reviewed:  - Telemetry:   - ECG (date***):   - TTE (date***):   - Chest x-ray (date***):   - Stress test:   - CCTA:  - Cardiac catheterization:  - Cardiac MRI:    - Labs:                        12.5   6.39  )-----------( 133      ( 14 Dec 2023 05:39 )             36.0     12-14    136  |  106  |  27<H>  ----------------------------<  202<H>  4.2   |  19  |  0.8    Ca    8.4      14 Dec 2023 05:39  Mg     1.8     12-14    TPro  6.4  /  Alb  3.6  /  TBili  0.7  /  DBili  x   /  AST  52<H>  /  ALT  24  /  AlkPhos  73  12-13    PT/INR - ( 13 Dec 2023 09:20 )   PT: 11.30 sec;   INR: 0.99 ratio         PTT - ( 13 Dec 2023 09:20 )  PTT:29.6 sec  Troponin T, Serum: 0.20 ng/mL (12-14-23 @ 05:39)  Troponin T, Serum: 0.18 ng/mL (12-13-23 @ 22:40)  Troponin T, Serum: 0.20 ng/mL (12-13-23 @ 09:20)        Triglycerides, Serum: 259 mg/dL (12-13-23 @ 22:40)  LDL Cholesterol Calculated: 56 mg/dL (12-13-23 @ 22:40)      Urinalysis Basic - ( 14 Dec 2023 05:39 )    Color: x / Appearance: x / SG: x / pH: x  Gluc: 202 mg/dL / Ketone: x  / Bili: x / Urobili: x   Blood: x / Protein: x / Nitrite: x   Leuk Esterase: x / RBC: x / WBC x   Sq Epi: x / Non Sq Epi: x / Bacteria: x        Medications:  apixaban 5 milliGRAM(s) Oral every 12 hours  atorvastatin 40 milliGRAM(s) Oral at bedtime  dextrose 50% Injectable 12.5 Gram(s) IV Push once  dextrose 50% Injectable 25 Gram(s) IV Push once  dextrose 50% Injectable 25 Gram(s) IV Push once  glucagon  Injectable 1 milliGRAM(s) IntraMuscular once  insulin lispro (ADMELOG) corrective regimen sliding scale   SubCutaneous at bedtime  insulin lispro (ADMELOG) corrective regimen sliding scale   SubCutaneous three times a day before meals  metoprolol tartrate 25 milliGRAM(s) Oral every 6 hours  pantoprazole    Tablet 40 milliGRAM(s) Oral before breakfast  tamsulosin 0.4 milliGRAM(s) Oral two times a day    Drips:  dextrose 5%. 1000 milliLiter(s) (50 mL/Hr) IV Continuous <Continuous>  dextrose 5%. 1000 milliLiter(s) (100 mL/Hr) IV Continuous <Continuous>    PRN:     Allergies    No Known Allergies    Intolerances      Assessment:      Problems discussed and associated plan:      Please contact me with any questions or concerns at [phone]. Chief complaint: Patient is a 83y old  Male who presents with a chief complaint of Afib with RVR (14 Dec 2023 12:39)    Interval history:    Review of systems: A complete 10-point review of systems was obtained and is negative except as stated in the interval history.    Vitals:  T(F): 98.1, Max: 98.2 (12-14 @ 02:00)  HR: 128 (63 - 136)  BP: 114/81 (87/58 - 127/85)  RR: 21 (12 - 21)  SpO2: 96% (94% - 99%)    Ins & outs:     12-13 @ 07:01  -  12-14 @ 07:00  --------------------------------------------------------  IN: 545 mL / OUT: 1550 mL / NET: -1005 mL    12-14 @ 07:01  -  12-14 @ 13:10  --------------------------------------------------------  IN: 0 mL / OUT: 275 mL / NET: -275 mL      Weight trend:  Weight (kg): 92.2 (12-13)    Physical exam:  General: No apparent distress  HEENT: Anicteric sclera. Moist mucous membranes. JVP *** cm.   Cardiac: Regular rate and rhythm. No murmurs, rubs, or gallops.   Vascular: Symmetric radial pulses. Dorsalis pedis pulses palpable.   Respiratory: Normal effort. Bibasilar crackles. Clear to ascultation.   Abdomen: Soft, nontender. Audible bowel sounds.   Extremities: Warm with *** edema. No cyanosis or clubbing.   Skin: Warm and dry. No rash.   Neurologic: Grossly normal motor function.   Psychiatric: Oriented to person, place, and time.     Data reviewed:  - Telemetry:   - ECG (date***):   - TTE (date***):   - Chest x-ray (date***):   - Stress test:   - CCTA:  - Cardiac catheterization:  - Cardiac MRI:    - Labs:                        12.5   6.39  )-----------( 133      ( 14 Dec 2023 05:39 )             36.0     12-14    136  |  106  |  27<H>  ----------------------------<  202<H>  4.2   |  19  |  0.8    Ca    8.4      14 Dec 2023 05:39  Mg     1.8     12-14    TPro  6.4  /  Alb  3.6  /  TBili  0.7  /  DBili  x   /  AST  52<H>  /  ALT  24  /  AlkPhos  73  12-13    PT/INR - ( 13 Dec 2023 09:20 )   PT: 11.30 sec;   INR: 0.99 ratio         PTT - ( 13 Dec 2023 09:20 )  PTT:29.6 sec  Troponin T, Serum: 0.20 ng/mL (12-14-23 @ 05:39)  Troponin T, Serum: 0.18 ng/mL (12-13-23 @ 22:40)  Troponin T, Serum: 0.20 ng/mL (12-13-23 @ 09:20)        Triglycerides, Serum: 259 mg/dL (12-13-23 @ 22:40)  LDL Cholesterol Calculated: 56 mg/dL (12-13-23 @ 22:40)      Urinalysis Basic - ( 14 Dec 2023 05:39 )    Color: x / Appearance: x / SG: x / pH: x  Gluc: 202 mg/dL / Ketone: x  / Bili: x / Urobili: x   Blood: x / Protein: x / Nitrite: x   Leuk Esterase: x / RBC: x / WBC x   Sq Epi: x / Non Sq Epi: x / Bacteria: x        Medications:  apixaban 5 milliGRAM(s) Oral every 12 hours  atorvastatin 40 milliGRAM(s) Oral at bedtime  dextrose 50% Injectable 12.5 Gram(s) IV Push once  dextrose 50% Injectable 25 Gram(s) IV Push once  dextrose 50% Injectable 25 Gram(s) IV Push once  glucagon  Injectable 1 milliGRAM(s) IntraMuscular once  insulin lispro (ADMELOG) corrective regimen sliding scale   SubCutaneous at bedtime  insulin lispro (ADMELOG) corrective regimen sliding scale   SubCutaneous three times a day before meals  metoprolol tartrate 25 milliGRAM(s) Oral every 6 hours  pantoprazole    Tablet 40 milliGRAM(s) Oral before breakfast  tamsulosin 0.4 milliGRAM(s) Oral two times a day    Drips:  dextrose 5%. 1000 milliLiter(s) (50 mL/Hr) IV Continuous <Continuous>  dextrose 5%. 1000 milliLiter(s) (100 mL/Hr) IV Continuous <Continuous>    PRN:     Allergies    No Known Allergies    Intolerances      Assessment:      Problems discussed and associated plan:      Please contact me with any questions or concerns at [phone]. Chief complaint: Patient is a 83y old  Male who presents with a chief complaint of Afib with RVR (14 Dec 2023 12:39)    Interval history: pt seen and examined at bedside, denies palpitations/SOB/chest pain/dizziness currently  HR on tele 130's aflutter   This AM BP was on the lower side on IV Diltiazem and it was stopped  NPO for GANGA/CV today     Review of systems: A complete 10-point review of systems was obtained and is negative except as stated in the interval history.    Vitals:  T(F): 98.1, Max: 98.2 (12-14 @ 02:00)  HR: 128 (63 - 136)  BP: 114/81 (87/58 - 127/85)  RR: 21 (12 - 21)  SpO2: 96% (94% - 99%)    Ins & outs:     12-13 @ 07:01  -  12-14 @ 07:00  --------------------------------------------------------  IN: 545 mL / OUT: 1550 mL / NET: -1005 mL    12-14 @ 07:01  -  12-14 @ 13:10  --------------------------------------------------------  IN: 0 mL / OUT: 275 mL / NET: -275 mL      Weight trend:  Weight (kg): 92.2 (12-13)    Physical exam:  General: No apparent distress  HEENT: Anicteric sclera. Moist mucous membranes. JVP negative    Cardiac: irregular rate and rhythm. No murmurs, rubs, or gallops.   Vascular: Symmetric radial pulses. Dorsalis pedis pulses palpable.   Respiratory: Normal effort. Clear to ascultation.   Abdomen: Soft, nontender. Audible bowel sounds.   Extremities: Warm with no edema. No cyanosis or clubbing.   Skin: Warm and dry. No rash.   Neurologic: Grossly normal motor function.   Psychiatric: Oriented to person, place, and time.     Data reviewed:  - Telemetry: HR on tele 130's aflutter   - ECG < from: 12 Lead ECG (12.14.23 @ 06:39) >  Diagnosis Line Atrial flutter with variable A-V block  Right bundle branch block  Abnormal ECG    < end of copied text >    - TTE < from: TTE Echo Complete w/o Contrast w/ Doppler (06.30.23 @ 14:35) >    Summary:   1. LV Ejection Fraction by Nunes's Method with a biplane EF of 58 %.   2. Normal global left ventricular systolic function.   3. Normal left atrial size.   4. Normal right atrial size.    < end of copied text >    - Chest x-ray < from: Xray Chest 1 View- PORTABLE-Urgent (12.13.23 @ 09:22) >    Impression:    No radiographic evidence of acute cardiopulmonary disease.    < end of copied text >  < from: CT Angio Chest PE Protocol w/ IV Cont (12.13.23 @ 11:09) >  IMPRESSION:    No evidence of pulmonary embolism.    < end of copied text >    - Stress test:   - CCTA:  - Cardiac catheterization:  - Cardiac MRI:    - Labs:                        12.5   6.39  )-----------( 133      ( 14 Dec 2023 05:39 )             36.0     12-14    136  |  106  |  27<H>  ----------------------------<  202<H>  4.2   |  19  |  0.8    Ca    8.4      14 Dec 2023 05:39  Mg     1.8     12-14    TPro  6.4  /  Alb  3.6  /  TBili  0.7  /  DBili  x   /  AST  52<H>  /  ALT  24  /  AlkPhos  73  12-13    PT/INR - ( 13 Dec 2023 09:20 )   PT: 11.30 sec;   INR: 0.99 ratio         PTT - ( 13 Dec 2023 09:20 )  PTT:29.6 sec  Troponin T, Serum: 0.20 ng/mL (12-14-23 @ 05:39)  Troponin T, Serum: 0.18 ng/mL (12-13-23 @ 22:40)  Troponin T, Serum: 0.20 ng/mL (12-13-23 @ 09:20)        Triglycerides, Serum: 259 mg/dL (12-13-23 @ 22:40)  LDL Cholesterol Calculated: 56 mg/dL (12-13-23 @ 22:40)      Urinalysis Basic - ( 14 Dec 2023 05:39 )    Color: x / Appearance: x / SG: x / pH: x  Gluc: 202 mg/dL / Ketone: x  / Bili: x / Urobili: x   Blood: x / Protein: x / Nitrite: x   Leuk Esterase: x / RBC: x / WBC x   Sq Epi: x / Non Sq Epi: x / Bacteria: x        Medications:  apixaban 5 milliGRAM(s) Oral every 12 hours  atorvastatin 40 milliGRAM(s) Oral at bedtime  dextrose 50% Injectable 12.5 Gram(s) IV Push once  dextrose 50% Injectable 25 Gram(s) IV Push once  dextrose 50% Injectable 25 Gram(s) IV Push once  glucagon  Injectable 1 milliGRAM(s) IntraMuscular once  insulin lispro (ADMELOG) corrective regimen sliding scale   SubCutaneous at bedtime  insulin lispro (ADMELOG) corrective regimen sliding scale   SubCutaneous three times a day before meals  metoprolol tartrate 25 milliGRAM(s) Oral every 6 hours  pantoprazole    Tablet 40 milliGRAM(s) Oral before breakfast  tamsulosin 0.4 milliGRAM(s) Oral two times a day    Drips:  dextrose 5%. 1000 milliLiter(s) (50 mL/Hr) IV Continuous <Continuous>  dextrose 5%. 1000 milliLiter(s) (100 mL/Hr) IV Continuous <Continuous>    PRN:     Allergies    No Known Allergies    Intolerances      Assessment:      Problems discussed and associated plan:      Please contact me with any questions or concerns at [phone]. Chief complaint: Patient is a 83y old  Male who presents with a chief complaint of Afib with RVR (14 Dec 2023 12:39)    Interval history: pt seen and examined at bedside, denies palpitations/SOB/chest pain/dizziness currently  HR on tele 130's aflutter   This AM BP was on the lower side on IV Diltiazem and it was stopped  NPO for GANGA/CV today     Review of systems: A complete 10-point review of systems was obtained and is negative except as stated in the interval history.    Vitals:  T(F): 98.1, Max: 98.2 (12-14 @ 02:00)  HR: 128 (63 - 136)  BP: 114/81 (87/58 - 127/85)  RR: 21 (12 - 21)  SpO2: 96% (94% - 99%)    Ins & outs:     12-13 @ 07:01  -  12-14 @ 07:00  --------------------------------------------------------  IN: 545 mL / OUT: 1550 mL / NET: -1005 mL    12-14 @ 07:01  -  12-14 @ 13:10  --------------------------------------------------------  IN: 0 mL / OUT: 275 mL / NET: -275 mL      Weight trend:  Weight (kg): 92.2 (12-13)    Physical exam:  General: No apparent distress  HEENT: Anicteric sclera. Moist mucous membranes. JVP negative    Cardiac: irregular rate and rhythm. No murmurs, rubs, or gallops.   Vascular: Symmetric radial pulses. Dorsalis pedis pulses palpable.   Respiratory: Normal effort. Clear to ascultation.   Abdomen: Soft, nontender. Audible bowel sounds.   Extremities: Warm with no edema. No cyanosis or clubbing.   Skin: Warm and dry. No rash.   Neurologic: Grossly normal motor function.   Psychiatric: Oriented to person, place, and time.     Data reviewed:  - Telemetry: HR on tele 130's aflutter   - ECG < from: 12 Lead ECG (12.14.23 @ 06:39) >  Diagnosis Line Atrial flutter with variable A-V block  Right bundle branch block  Abnormal ECG    < end of copied text >    - TTE < from: TTE Echo Complete w/o Contrast w/ Doppler (06.30.23 @ 14:35) >    Summary:   1. LV Ejection Fraction by Nunes's Method with a biplane EF of 58 %.   2. Normal global left ventricular systolic function.   3. Normal left atrial size.   4. Normal right atrial size.    < end of copied text >    - Chest x-ray < from: Xray Chest 1 View- PORTABLE-Urgent (12.13.23 @ 09:22) >    Impression:    No radiographic evidence of acute cardiopulmonary disease.    < end of copied text >  < from: CT Angio Chest PE Protocol w/ IV Cont (12.13.23 @ 11:09) >  IMPRESSION:    No evidence of pulmonary embolism.    < end of copied text >    - Stress test:   - CCTA:  - Cardiac catheterization:  - Cardiac MRI:    - Labs:                        12.5   6.39  )-----------( 133      ( 14 Dec 2023 05:39 )             36.0     12-14    136  |  106  |  27<H>  ----------------------------<  202<H>  4.2   |  19  |  0.8    Ca    8.4      14 Dec 2023 05:39  Mg     1.8     12-14    TPro  6.4  /  Alb  3.6  /  TBili  0.7  /  DBili  x   /  AST  52<H>  /  ALT  24  /  AlkPhos  73  12-13    PT/INR - ( 13 Dec 2023 09:20 )   PT: 11.30 sec;   INR: 0.99 ratio         PTT - ( 13 Dec 2023 09:20 )  PTT:29.6 sec  Troponin T, Serum: 0.20 ng/mL (12-14-23 @ 05:39)  Troponin T, Serum: 0.18 ng/mL (12-13-23 @ 22:40)  Troponin T, Serum: 0.20 ng/mL (12-13-23 @ 09:20)        Triglycerides, Serum: 259 mg/dL (12-13-23 @ 22:40)  LDL Cholesterol Calculated: 56 mg/dL (12-13-23 @ 22:40)      Urinalysis Basic - ( 14 Dec 2023 05:39 )    Color: x / Appearance: x / SG: x / pH: x  Gluc: 202 mg/dL / Ketone: x  / Bili: x / Urobili: x   Blood: x / Protein: x / Nitrite: x   Leuk Esterase: x / RBC: x / WBC x   Sq Epi: x / Non Sq Epi: x / Bacteria: x        Medications:  apixaban 5 milliGRAM(s) Oral every 12 hours  atorvastatin 40 milliGRAM(s) Oral at bedtime  dextrose 50% Injectable 12.5 Gram(s) IV Push once  dextrose 50% Injectable 25 Gram(s) IV Push once  dextrose 50% Injectable 25 Gram(s) IV Push once  glucagon  Injectable 1 milliGRAM(s) IntraMuscular once  insulin lispro (ADMELOG) corrective regimen sliding scale   SubCutaneous at bedtime  insulin lispro (ADMELOG) corrective regimen sliding scale   SubCutaneous three times a day before meals  metoprolol tartrate 25 milliGRAM(s) Oral every 6 hours  pantoprazole    Tablet 40 milliGRAM(s) Oral before breakfast  tamsulosin 0.4 milliGRAM(s) Oral two times a day    Drips:  dextrose 5%. 1000 milliLiter(s) (50 mL/Hr) IV Continuous <Continuous>  dextrose 5%. 1000 milliLiter(s) (100 mL/Hr) IV Continuous <Continuous>    PRN:     Allergies    No Known Allergies    Intolerances      Assessment:      Problems discussed and associated plan:      Please contact me with any questions or concerns at [phone]. Chief complaint: Patient is a 83y old  Male who presents with a chief complaint of Afib with RVR (14 Dec 2023 12:39)    Interval history: pt seen and examined at bedside, denies palpitations/SOB/chest pain/dizziness currently  HR on tele 130's aflutter   This AM BP was on the lower side on IV Diltiazem and it was stopped  NPO for GANGA/CV today     Review of systems: A complete 10-point review of systems was obtained and is negative except as stated in the interval history.    Vitals:  T(F): 98.1, Max: 98.2 (12-14 @ 02:00)  HR: 128 (63 - 136)  BP: 114/81 (87/58 - 127/85)  RR: 21 (12 - 21)  SpO2: 96% (94% - 99%)    Ins & outs:     12-13 @ 07:01  -  12-14 @ 07:00  --------------------------------------------------------  IN: 545 mL / OUT: 1550 mL / NET: -1005 mL    12-14 @ 07:01  -  12-14 @ 13:10  --------------------------------------------------------  IN: 0 mL / OUT: 275 mL / NET: -275 mL      Weight trend:  Weight (kg): 92.2 (12-13)    Physical exam:  General: No apparent distress  HEENT: Anicteric sclera. Moist mucous membranes. JVP negative    Cardiac: irregular rate and rhythm. No murmurs, rubs, or gallops.   Vascular: Symmetric radial pulses. Dorsalis pedis pulses palpable.   Respiratory: Normal effort. Clear to ascultation.   Abdomen: Soft, nontender. Audible bowel sounds.   Extremities: Warm with no edema. No cyanosis or clubbing.   Skin: Warm and dry. No rash.   Neurologic: Grossly normal motor function.   Psychiatric: Oriented to person, place, and time.     Data reviewed:  - Telemetry: HR on tele 130's aflutter   - ECG < from: 12 Lead ECG (12.14.23 @ 06:39) >  Diagnosis Line Atrial flutter with variable A-V block  Right bundle branch block  Abnormal ECG    < end of copied text >    - TTE < from: TTE Echo Complete w/o Contrast w/ Doppler (06.30.23 @ 14:35) >    Summary:   1. LV Ejection Fraction by Nunes's Method with a biplane EF of 58 %.   2. Normal global left ventricular systolic function.   3. Normal left atrial size.   4. Normal right atrial size.    < end of copied text >    - Chest x-ray < from: Xray Chest 1 View- PORTABLE-Urgent (12.13.23 @ 09:22) >    Impression:    No radiographic evidence of acute cardiopulmonary disease.    < end of copied text >  < from: CT Angio Chest PE Protocol w/ IV Cont (12.13.23 @ 11:09) >  IMPRESSION:    No evidence of pulmonary embolism.    < end of copied text >    - Stress test: < from: NM Nuclear Stress Pharmacologic Multiple (07.01.23 @ 14:16) >  Impression:  1. NORMAL LEXISCAN / REST MYOCARDIAL PERFUSION TOMOGRAPHY, WITH NO   EVIDENCE FOR ISCHEMIA DURING LEXISCAN INFUSION.  2. NORMAL RESTING LEFT VENTRICULAR WALL MOTION AND WALL THICKENING.  3. LEFT VENTRICULAR EJECTION FRACTION OF  58 % WHICH IS WITHIN RANGE OF   NORMAL.    < end of copied text >            - Labs:                        12.5   6.39  )-----------( 133      ( 14 Dec 2023 05:39 )             36.0     12-14    136  |  106  |  27<H>  ----------------------------<  202<H>  4.2   |  19  |  0.8    Ca    8.4      14 Dec 2023 05:39  Mg     1.8     12-14    TPro  6.4  /  Alb  3.6  /  TBili  0.7  /  DBili  x   /  AST  52<H>  /  ALT  24  /  AlkPhos  73  12-13    PT/INR - ( 13 Dec 2023 09:20 )   PT: 11.30 sec;   INR: 0.99 ratio         PTT - ( 13 Dec 2023 09:20 )  PTT:29.6 sec  Troponin T, Serum: 0.20 ng/mL (12-14-23 @ 05:39)  Troponin T, Serum: 0.18 ng/mL (12-13-23 @ 22:40)  Troponin T, Serum: 0.20 ng/mL (12-13-23 @ 09:20)        Triglycerides, Serum: 259 mg/dL (12-13-23 @ 22:40)  LDL Cholesterol Calculated: 56 mg/dL (12-13-23 @ 22:40)        Medications:  apixaban 5 milliGRAM(s) Oral every 12 hours  atorvastatin 40 milliGRAM(s) Oral at bedtime  dextrose 50% Injectable 12.5 Gram(s) IV Push once  dextrose 50% Injectable 25 Gram(s) IV Push once  dextrose 50% Injectable 25 Gram(s) IV Push once  glucagon  Injectable 1 milliGRAM(s) IntraMuscular once  insulin lispro (ADMELOG) corrective regimen sliding scale   SubCutaneous at bedtime  insulin lispro (ADMELOG) corrective regimen sliding scale   SubCutaneous three times a day before meals  metoprolol tartrate 25 milliGRAM(s) Oral every 6 hours  pantoprazole    Tablet 40 milliGRAM(s) Oral before breakfast  tamsulosin 0.4 milliGRAM(s) Oral two times a day    Drips:  dextrose 5%. 1000 milliLiter(s) (50 mL/Hr) IV Continuous <Continuous>  dextrose 5%. 1000 milliLiter(s) (100 mL/Hr) IV Continuous <Continuous>    PRN:     Allergies    No Known Allergies    Intolerances

## 2023-12-14 NOTE — CONSULT NOTE ADULT - ASSESSMENT
**INCOMPLETE NOTE**    84 YO M with PMHx of HTN, HLD, DM type II, Paroxysmal AFib/flutter (refused anticoagulation in the past), SVT, BPH, GERD, and prostate CA in remission who presented to the ED with acute onset of dizziness, lightheaded, associated with shortness of breath for 2 days. Patient was admitted in July for SVT with HR in the 200s s/p cardioversion by EMS.     TTE 6/30/23: EF 58%.  Stress Test 7/1/23: No evidence for ischemia.    IMPRESSION  #Paroxysmal Afib/Flutter  - CHADSVASc 4  - Planned for GANGA/DCCV today  - Patient agreeable to take AC  #HTN  #DM type II: HbA1C 7.4 % [06-30-23]   #Prostate Ca in remission       **INCOMPLETE NOTE**    82 YO M with PMHx of HTN, HLD, DM type II, Paroxysmal AFib/flutter (refused anticoagulation in the past), SVT, BPH, GERD, and prostate CA in remission who presented to the ED with acute onset of dizziness, lightheaded, associated with shortness of breath for 2 days. Patient was admitted in July for SVT with HR in the 200s s/p cardioversion by EMS.     TTE 6/30/23: EF 58%.  Stress Test 7/1/23: No evidence for ischemia.    IMPRESSION  #Paroxysmal Afib/Flutter  - CHADSVASc 4  - Planned for GANGA/DCCV today  - Patient agreeable to take AC  #HTN  #DM type II: HbA1C 7.4 % [06-30-23]   #Prostate Ca in remission           84 YO M with PMHx of HTN, HLD, DM type II, Paroxysmal AFib/flutter (refused anticoagulation in the past), SVT, BPH, GERD, and prostate CA in remission who presented to the ED with acute onset of dizziness, lightheaded, associated with shortness of breath for 2 days. Patient was admitted in July for SVT with HR in the 200s s/p cardioversion by EMS.     TTE 6/30/23: EF 58%.  Stress Test 7/1/23: No evidence for ischemia.    IMPRESSION  #Paroxysmal Afib/Flutter  - CHADSVASc 4  - Planned for GANGA/DCCV today  - Patient agreeable to take AC  #HTN  #DM type II: HbA1C 7.4 % [06-30-23]   #Prostate Ca in remission

## 2023-12-14 NOTE — DISCHARGE NOTE PROVIDER - CARE PROVIDER_API CALL
Bobby Parker  Established Patient  Follow Up Time: 2 weeks   Bobby Parker  Established Patient  Follow Up Time: 1 month    Marium Joaquin  Cardiac Electrophysiology  [address] [place] [id number]  Phone: [phone]  Fax: [phone]  Established Patient  Follow Up Time: 2 weeks   Bobby Parker  Established Patient  Follow Up Time: 1 month    Marium Joaquin  Cardiac Electrophysiology  [address] [place] [id number]  Phone: [phone]  Fax: [phone]  Established Patient  Follow Up Time: 2 weeks

## 2023-12-14 NOTE — CHART NOTE - NSCHARTNOTEFT_GEN_A_CORE
PRE-OP DIAGNOSIS:  - Flutter     POST-OP DIAGNOSIS:  - No LUCY thrombus   - Normal LVEF    PROCEDURE: Transesophageal echocardiogram    Primary Physician: Dr. Monae   Assistant: Dr. Lemons    ANESTHESIA TYPE:   [ x ] Conscious Sedation    CONDITION:  [  ] Good    ESTIMATED BLOOD LOSS: None    COMPLICATIONS: None    FINDINGS:    After risks and benefits of procedures were explained, informed consent was obtained and placed in chart. Refer to Anesthesia note for sedation details.  The GANGA probe was passed into the esophagus without difficulty.  Transesophageal images were obtained.  The GANGA probe was removed without difficulty and examined.  There was no evidence for bleeding.  The patient tolerated the procedure well without any immediate GANGA-related complications.      Preliminary Findings:  LA: Mildly enlarged   LUCY: Left atrial appendage was clear of clot and smoke.  LV: LVEF was estimated at 55%.   MV: Trace MR, no evidence of MS.   AV: Trace AI, no evidence of AS.   RA: Normal.   RV: Normal.   TV: no TR.   PV: no PI.   IAS: no PFO. No R-> L shunt by color doppler.   Aorta: There was mild, non-mobile atheroma seen in the thoracic aorta.     Patient successfully converted to sinus rhythm with synchronized  200J of direct current cardioversion.    PLAN OF CARE:  - Return to inpatient bed   - Continue AC and rate control PRE-OP DIAGNOSIS:  - Flutter     POST-OP DIAGNOSIS:  - No LUCY thrombus   - Normal LVEF    PROCEDURE: Transesophageal echocardiogram    Primary Physician: Dr. Monae   Assistant: Dr. Lemons    ANESTHESIA TYPE:   [ x ] Conscious Sedation    CONDITION:  [  ] Good    ESTIMATED BLOOD LOSS: None    COMPLICATIONS: None    FINDINGS:    After risks and benefits of procedures were explained, informed consent was obtained and placed in chart. Refer to Anesthesia note for sedation details.  The GANGA probe was passed into the esophagus without difficulty.  Transesophageal images were obtained.  The GANGA probe was removed without difficulty and examined.  There was no evidence for bleeding.  The patient tolerated the procedure well without any immediate GANGA-related complications.      Preliminary Findings:  LA: Mildly enlarged   LUCY: Left atrial appendage was clear of clot and smoke.  LV: LVEF was estimated at 55%.   MV: Trace MR, no evidence of MS.   AV: Mild AI, no evidence of AS.   RA: Normal.   RV: Normal.   TV: Not well visualized   PV: no PI.   IAS: no PFO. No R-> L shunt by color doppler.   Aorta: There was mild, non-mobile atheroma seen in the thoracic aorta.     Patient successfully converted to sinus rhythm with synchronized  200J of direct current cardioversion.    PLAN OF CARE:  - Return to inpatient bed   - Continue AC and rate control PRE-OP DIAGNOSIS:    - Flutter     POST-OP DIAGNOSIS:  - No LUCY thrombus   - Normal LVEF    PROCEDURE: Transesophageal echocardiogram    Primary Physician: Dr. Monae   Assistant: Dr. Lemons    ANESTHESIA TYPE:   [ x ] Conscious Sedation    CONDITION:  [  ] Good    ESTIMATED BLOOD LOSS: None    COMPLICATIONS: None    FINDINGS:    After risks and benefits of procedures were explained, informed consent was obtained and placed in chart. Refer to Anesthesia note for sedation details.  The GANGA probe was passed into the esophagus without difficulty.  Transesophageal images were obtained.  The GANGA probe was removed without difficulty and examined.  There was no evidence for bleeding.  The patient tolerated the procedure well without any immediate GANGA-related complications.      Preliminary Findings:  LA: Mildly enlarged   LUCY: Left atrial appendage was clear of clot and smoke.  LV: LVEF was estimated at 55%.   MV: Trace MR, no evidence of MS.   AV: Mild AI, no evidence of AS.   RA: Normal.   RV: Normal.   TV: Not well visualized   PV: no PI.   IAS: no PFO. No R-> L shunt by color doppler.   Aorta: There was mild, non-mobile atheroma seen in the thoracic aorta.     Patient successfully converted to sinus rhythm with synchronized  200J of direct current cardioversion.    PLAN OF CARE:  - Return to inpatient bed   - Continue AC and rate control

## 2023-12-14 NOTE — CONSULT NOTE ADULT - SUBJECTIVE AND OBJECTIVE BOX
Date of Admission: 23    CHIEF COMPLAINT:     HISTORY OF PRESENT ILLNESS:    83 year old male patient of Adrianne Fabian with PMHx of HTN, HLD, DM, Paroxysmal AFib (refused to take anticoagulation), SVT, BPH, GERD, and prostate CA in remission who presented to the ED with acute onset of dizziness, lightheaded, associated with shortness of breath for 2 days. Patient states he had an episode of dizziness and SOB but no LOC. Patient recently admitted in July for SVT HR in the 200s and successful cardioversion to SR. He says this happens to him often however once he sits down and rests it usually passes. As per patient he was diagnosed with Afib 30 years ago and ever since hes refused any blood thinners or unnecessary procedures. Patient saying he does not want to be on blood thinners at this time.  Patient denies CP, palpitations, syncope, orthopnea.       In the ED: VS 96.8F, , /80, RR 17, O2 sat 98% on room air. Labs were significant for troponin 0.2 and pro-BNP of 4362. ECG revealing Afib with RVR,  bpm with RBBB. CXR w/ clear lungs. Pt received in  mg x1, IV Diltiazem 20 mg x1, and was started on Diltiazem gtt @ 5 mg/hr.  (13 Dec 2023 15:05)    PAST MEDICAL & SURGICAL HISTORY  Diabetes mellitus    Hypertension    Paroxysmal atrial fibrillation    Prostate cancer    Status post cervical spinal fusion        FAMILY HISTORY:  No pertinent family history in first degree relatives    SOCIAL HISTORY:   [X ] Non-smoker  [ ] Smoker  [ ] Alcohol    Allergies    No Known Allergies    Intolerances    	    REVIEW OF SYMPTOMS:  CONSTITUTIONAL: No weakness, fevers or chills; No headaches  EYES: No visual changes, eye pain, or discharge  ENT: No vertigo; No ear pain or change in hearing; No sore throat or difficulty swallowing  NECK: No pain or stiffness  RESPIRATORY: No cough, wheezing, or hemoptysis; No shortness of breath  CARDIOVASCULAR: Palpitations  GASTROINTESTINAL: No abdominal or epigastric pain; No nausea, vomiting, or hematemesis; No diarrhea or constipation; No melena or hematochezia  GENITOURINARY: No dysuria, frequency or hematuria  MUSCULOSKELETAL: No joint pain, no muscle pain, no weakness  NEUROLOGICAL: No numbness or weakness  SKIN: No itching or rashes      VITALS:  T(C): 36.7 (23 @ 07:34), Max: 36.8 (23 @ 02:00)  HR: 128 (23 @ 11:27) (63 - 136)  BP: 114/81 (23 @ 11:27) (87/58 - 127/85)  RR: 21 (23 @ 11:27) (12 - 21)  SpO2: 96% (23 @ 11:27) (94% - 99%)  Wt(kg): --  I&O's Summary    13 Dec 2023 07:01  -  14 Dec 2023 07:00  --------------------------------------------------------  IN: 545 mL / OUT: 1550 mL / NET: -1005 mL    14 Dec 2023 07:01  -  14 Dec 2023 12:40  --------------------------------------------------------  IN: 0 mL / OUT: 275 mL / NET: -275 mL      Daily Height in cm: 185.42 (13 Dec 2023 17:00)    Daily     PHYSICAL EXAM:  GEN: Not in acute distress  HEENT: EOMI  LUNGS: Dec BS   CARDIOVASCULAR: Regular. Tachy.  ABD: Soft, non-tender, non-distended  EXT: No SHER  SKIN: Intact  NEURO: AAOx3    LABS:	 	                          12.5   6.39  )-----------( 133      ( 14 Dec 2023 05:39 )             36.0         136  |  106  |  27<H>  ----------------------------<  202<H>  4.2   |  19  |  0.8    Ca    8.4      14 Dec 2023 05:39  Mg     1.8         TPro  6.4  /  Alb  3.6  /  TBili  0.7  /  DBili  x   /  AST  52<H>  /  ALT  24  /  AlkPhos  73  12-13    CARDIAC MARKERS ( 14 Dec 2023 05:39 )  x     / 0.20 ng/mL / x     / x     / x      CARDIAC MARKERS ( 13 Dec 2023 22:40 )  x     / 0.18 ng/mL / x     / x     / x      CARDIAC MARKERS ( 13 Dec 2023 09:20 )  x     / 0.20 ng/mL / x     / x     / x          PT/INR - ( 13 Dec 2023 09:20 )   PT: 11.30 sec;   INR: 0.99 ratio         PTT - ( 13 Dec 2023 09:20 )  PTT:29.6 sec    CARDIAC MARKERS:  Troponin trend: 0.20  (13 Dec 2023 09:20), 0.18  (13 Dec 2023 22:40), 0.20  (14 Dec 2023 05:39)    TELEMETRY EVENTS:      ECG:    RADIOLOGY:    OTHER:    Echocardiogram:  < from: TTE Echo Complete w/o Contrast w/ Doppler (23 @ 14:35) >   1. LV Ejection Fraction by Nunes's Method with a biplane EF of 58 %.   2. Normal global left ventricular systolic function.   3. Normal left atrial size.   4. Normal right atrial size.    < end of copied text >    Catheterization:    Stress Test: 	  < from: NM Nuclear Stress Pharmacologic Multiple (23 @ 14:16) >    Impression:  1. NORMAL LEXISCAN / REST MYOCARDIAL PERFUSION TOMOGRAPHY, WITH NO   EVIDENCE FOR ISCHEMIA DURING LEXISCAN INFUSION.  2. NORMAL RESTING LEFT VENTRICULAR WALL MOTION AND WALL THICKENING.  3. LEFT VENTRICULAR EJECTION FRACTION OF  58 % WHICH IS WITHIN RANGE OF   NORMAL.        < end of copied text >    Remote/Ambulatory Rhythm Monitorin/ 17 days  AVG HR 65 bpm  Two episodes of SVT   < 1% PACs. <1 % PVCs  	    Home Medications:  aspirin 81 mg oral tablet: 1 tab(s) orally once a day (13 Dec 2023 16:20)  Coenzyme Q10 200 mg oral capsule: 1 tab(s) orally once a day (13 Dec 2023 16:16)  enalapril 2.5 mg oral tablet: 1 tab(s) orally once a day (13 Dec 2023 16:20)  Flomax 0.4 mg oral capsule: 1 tab(s) orally 2 times a day (13 Dec 2023 16:20)  glimepiride 1 mg oral tablet: 1 tab(s) orally 2 times a day (13 Dec 2023 16:20)  metFORMIN 1000 mg oral tablet: 1 tab(s) orally 2 times a day (13 Dec 2023 16:20)  metoprolol tartrate 25 mg oral tablet: 1 tab(s) orally 2 times a day (13 Dec 2023 16:20)  Omega-3 Fish Oil 1000 mg oral capsule: 1 tab(s) orally 2 times a day (13 Dec 2023 16:20)  omeprazole 20 mg oral delayed release tablet: 1 tab(s) orally once a day (13 Dec 2023 16:20)  simvastatin 40 mg oral tablet: 1 tab(s) orally once a day (13 Dec 2023 16:20)    MEDICATIONS  (STANDING):  apixaban 5 milliGRAM(s) Oral every 12 hours  atorvastatin 40 milliGRAM(s) Oral at bedtime  dextrose 5%. 1000 milliLiter(s) (100 mL/Hr) IV Continuous <Continuous>  dextrose 5%. 1000 milliLiter(s) (50 mL/Hr) IV Continuous <Continuous>  dextrose 50% Injectable 12.5 Gram(s) IV Push once  dextrose 50% Injectable 25 Gram(s) IV Push once  dextrose 50% Injectable 25 Gram(s) IV Push once  glucagon  Injectable 1 milliGRAM(s) IntraMuscular once  insulin lispro (ADMELOG) corrective regimen sliding scale   SubCutaneous at bedtime  insulin lispro (ADMELOG) corrective regimen sliding scale   SubCutaneous three times a day before meals  metoprolol tartrate 25 milliGRAM(s) Oral every 6 hours  pantoprazole    Tablet 40 milliGRAM(s) Oral before breakfast  tamsulosin 0.4 milliGRAM(s) Oral two times a day    MEDICATIONS  (PRN):  dextrose Oral Gel 15 Gram(s) Oral once PRN Blood Glucose LESS THAN 70 milliGRAM(s)/deciliter

## 2023-12-14 NOTE — CONSULT NOTE ADULT - ATTENDING COMMENTS
complex patient  QTc prolonged   Consider lower Metoprolol dose and start Multaq  recommend outpatient f/u for catheter ablation
Briefly, 83 year old man for whom cardiology is consulted for atrial flutter with RVR.     VS, PE as above.    Telemetry, labs, imaging personally reviewed.     Plan:  - Continue home metoprolol tartrate 25mg PO BID and increase as tolerate; we will aim for lenient rate control, <110 bpm  - Start cardizem drip for additional rate control  - Patient's troponin is elevated, most likely type II MI from RVR; he had a stress test within the year which was negative for ischemia. Would trend troponins until peak  - Check echocardiogram when rate is better controlled  - We discussed anticoagulation for stroke prophylaxis at length with patient and family. We reviewed the risks, benefits and side effects of anticoagulation. The patient is starting to consider NOAC. If he agrees, would consider GANGA/DCCV for restoration of sinus rhythm.   - Patient being admitted to

## 2023-12-14 NOTE — DISCHARGE NOTE PROVIDER - HOSPITAL COURSE
83 year old male patient of Adrianne Fabian with PMHx of HTN, HLD, DM, Paroxysmal AFib (refused to take anticoagulation), SVT, BPH, GERD, and prostate CA in remission who presented to the ED with acute onset of dizziness, lightheaded, associated with shortness of breath for 2 days. Patient states he had an episode of dizziness and SOB but no LOC. Patient recently admitted in July for SVT HR in the 200s and successful cardioversion to SR. He says this happens to him often however once he sits down and rests it usually passes. As per patient he was diagnosed with Afib 30 years ago and ever since hes refused any blood thinners or unnecessary procedures. Patient saying he does not want to be on blood thinners at this time.  Patient denies CP, palpitations, syncope, orthopnea.       In the ED: VS 96.8F, , /80, RR 17, O2 sat 98% on room air. Labs were significant for troponin 0.2 and pro-BNP of 4362. ECG revealing Afib with RVR,  bpm with RBBB. CXR w/ clear lungs. Pt received in  mg x1, IV Diltiazem 20 mg x1, and was started on Diltiazem gtt @ 5 mg/hr.   Patient was admitted  cards tele, patient with Aflutter, RVR was reluctant to start an anticoagulant, but after conversations with his wife and son, he is now amenable. Plan for GANGA and DCCV. Patient was started on Eliquis 5 mg q12.    On 12/14/2023;  Patient successfully converted to sinus rhythm with synchronized  200J of direct current cardioversion. Patient was monitored overnight. On POD 1 patient remains HD stable with no complaints. Patient remains in SR with no arrhythmias noted on tele. For PVI- Patient should continue Eliquis for thromboembolic prophylaxis. Patient is being DC home in stable condition.

## 2023-12-14 NOTE — DISCHARGE NOTE PROVIDER - PROVIDER TOKENS
PROVIDER:[TOKEN:[[id number]],FOLLOWUP:[2 weeks],ESTABLISHEDPATIENT:[T]] PROVIDER:[TOKEN:[[id number]],FOLLOWUP:[2 weeks],ESTABLISHEDPATIENT:[T]] PROVIDER:[TOKEN:[[id number]],FOLLOWUP:[1 month],ESTABLISHEDPATIENT:[T]],PROVIDER:[TOKEN:[[id number]],FOLLOWUP:[2 weeks],ESTABLISHEDPATIENT:[T]] PROVIDER:[TOKEN:[[id number]],FOLLOWUP:[1 month],ESTABLISHEDPATIENT:[T]],PROVIDER:[TOKEN:[[id number]],FOLLOWUP:[2 weeks],ESTABLISHEDPATIENT:[T]]

## 2023-12-15 ENCOUNTER — TRANSCRIPTION ENCOUNTER (OUTPATIENT)
Age: 83
End: 2023-12-15

## 2023-12-15 VITALS
HEART RATE: 68 BPM | RESPIRATION RATE: 19 BRPM | OXYGEN SATURATION: 96 % | DIASTOLIC BLOOD PRESSURE: 68 MMHG | SYSTOLIC BLOOD PRESSURE: 132 MMHG

## 2023-12-15 LAB
ALBUMIN SERPL ELPH-MCNC: 3.5 G/DL — SIGNIFICANT CHANGE UP (ref 3.5–5.2)
ALBUMIN SERPL ELPH-MCNC: 3.5 G/DL — SIGNIFICANT CHANGE UP (ref 3.5–5.2)
ALP SERPL-CCNC: 72 U/L — SIGNIFICANT CHANGE UP (ref 30–115)
ALP SERPL-CCNC: 72 U/L — SIGNIFICANT CHANGE UP (ref 30–115)
ALT FLD-CCNC: 18 U/L — SIGNIFICANT CHANGE UP (ref 0–41)
ALT FLD-CCNC: 18 U/L — SIGNIFICANT CHANGE UP (ref 0–41)
ANION GAP SERPL CALC-SCNC: 10 MMOL/L — SIGNIFICANT CHANGE UP (ref 7–14)
ANION GAP SERPL CALC-SCNC: 10 MMOL/L — SIGNIFICANT CHANGE UP (ref 7–14)
AST SERPL-CCNC: 17 U/L — SIGNIFICANT CHANGE UP (ref 0–41)
AST SERPL-CCNC: 17 U/L — SIGNIFICANT CHANGE UP (ref 0–41)
BASOPHILS # BLD AUTO: 0.03 K/UL — SIGNIFICANT CHANGE UP (ref 0–0.2)
BASOPHILS # BLD AUTO: 0.03 K/UL — SIGNIFICANT CHANGE UP (ref 0–0.2)
BASOPHILS NFR BLD AUTO: 0.6 % — SIGNIFICANT CHANGE UP (ref 0–1)
BASOPHILS NFR BLD AUTO: 0.6 % — SIGNIFICANT CHANGE UP (ref 0–1)
BILIRUB DIRECT SERPL-MCNC: <0.2 MG/DL — SIGNIFICANT CHANGE UP (ref 0–0.3)
BILIRUB DIRECT SERPL-MCNC: <0.2 MG/DL — SIGNIFICANT CHANGE UP (ref 0–0.3)
BILIRUB INDIRECT FLD-MCNC: SIGNIFICANT CHANGE UP MG/DL (ref 0.2–1.2)
BILIRUB INDIRECT FLD-MCNC: SIGNIFICANT CHANGE UP MG/DL (ref 0.2–1.2)
BILIRUB SERPL-MCNC: 0.5 MG/DL — SIGNIFICANT CHANGE UP (ref 0.2–1.2)
BILIRUB SERPL-MCNC: 0.5 MG/DL — SIGNIFICANT CHANGE UP (ref 0.2–1.2)
BUN SERPL-MCNC: 19 MG/DL — SIGNIFICANT CHANGE UP (ref 10–20)
BUN SERPL-MCNC: 19 MG/DL — SIGNIFICANT CHANGE UP (ref 10–20)
CALCIUM SERPL-MCNC: 8.4 MG/DL — SIGNIFICANT CHANGE UP (ref 8.4–10.5)
CALCIUM SERPL-MCNC: 8.4 MG/DL — SIGNIFICANT CHANGE UP (ref 8.4–10.5)
CHLORIDE SERPL-SCNC: 108 MMOL/L — SIGNIFICANT CHANGE UP (ref 98–110)
CHLORIDE SERPL-SCNC: 108 MMOL/L — SIGNIFICANT CHANGE UP (ref 98–110)
CO2 SERPL-SCNC: 20 MMOL/L — SIGNIFICANT CHANGE UP (ref 17–32)
CO2 SERPL-SCNC: 20 MMOL/L — SIGNIFICANT CHANGE UP (ref 17–32)
CREAT SERPL-MCNC: 1 MG/DL — SIGNIFICANT CHANGE UP (ref 0.7–1.5)
CREAT SERPL-MCNC: 1 MG/DL — SIGNIFICANT CHANGE UP (ref 0.7–1.5)
EGFR: 75 ML/MIN/1.73M2 — SIGNIFICANT CHANGE UP
EGFR: 75 ML/MIN/1.73M2 — SIGNIFICANT CHANGE UP
EOSINOPHIL # BLD AUTO: 0.32 K/UL — SIGNIFICANT CHANGE UP (ref 0–0.7)
EOSINOPHIL # BLD AUTO: 0.32 K/UL — SIGNIFICANT CHANGE UP (ref 0–0.7)
EOSINOPHIL NFR BLD AUTO: 6.2 % — SIGNIFICANT CHANGE UP (ref 0–8)
EOSINOPHIL NFR BLD AUTO: 6.2 % — SIGNIFICANT CHANGE UP (ref 0–8)
GLUCOSE BLDC GLUCOMTR-MCNC: 244 MG/DL — HIGH (ref 70–99)
GLUCOSE BLDC GLUCOMTR-MCNC: 244 MG/DL — HIGH (ref 70–99)
GLUCOSE BLDC GLUCOMTR-MCNC: 266 MG/DL — HIGH (ref 70–99)
GLUCOSE BLDC GLUCOMTR-MCNC: 266 MG/DL — HIGH (ref 70–99)
GLUCOSE SERPL-MCNC: 161 MG/DL — HIGH (ref 70–99)
GLUCOSE SERPL-MCNC: 161 MG/DL — HIGH (ref 70–99)
HCT VFR BLD CALC: 34.1 % — LOW (ref 42–52)
HCT VFR BLD CALC: 34.1 % — LOW (ref 42–52)
HGB BLD-MCNC: 11.9 G/DL — LOW (ref 14–18)
HGB BLD-MCNC: 11.9 G/DL — LOW (ref 14–18)
IMM GRANULOCYTES NFR BLD AUTO: 0.6 % — HIGH (ref 0.1–0.3)
IMM GRANULOCYTES NFR BLD AUTO: 0.6 % — HIGH (ref 0.1–0.3)
LYMPHOCYTES # BLD AUTO: 1.35 K/UL — SIGNIFICANT CHANGE UP (ref 1.2–3.4)
LYMPHOCYTES # BLD AUTO: 1.35 K/UL — SIGNIFICANT CHANGE UP (ref 1.2–3.4)
LYMPHOCYTES # BLD AUTO: 26.1 % — SIGNIFICANT CHANGE UP (ref 20.5–51.1)
LYMPHOCYTES # BLD AUTO: 26.1 % — SIGNIFICANT CHANGE UP (ref 20.5–51.1)
MAGNESIUM SERPL-MCNC: 1.9 MG/DL — SIGNIFICANT CHANGE UP (ref 1.8–2.4)
MAGNESIUM SERPL-MCNC: 1.9 MG/DL — SIGNIFICANT CHANGE UP (ref 1.8–2.4)
MCHC RBC-ENTMCNC: 31.7 PG — HIGH (ref 27–31)
MCHC RBC-ENTMCNC: 31.7 PG — HIGH (ref 27–31)
MCHC RBC-ENTMCNC: 34.9 G/DL — SIGNIFICANT CHANGE UP (ref 32–37)
MCHC RBC-ENTMCNC: 34.9 G/DL — SIGNIFICANT CHANGE UP (ref 32–37)
MCV RBC AUTO: 90.9 FL — SIGNIFICANT CHANGE UP (ref 80–94)
MCV RBC AUTO: 90.9 FL — SIGNIFICANT CHANGE UP (ref 80–94)
MONOCYTES # BLD AUTO: 0.52 K/UL — SIGNIFICANT CHANGE UP (ref 0.1–0.6)
MONOCYTES # BLD AUTO: 0.52 K/UL — SIGNIFICANT CHANGE UP (ref 0.1–0.6)
MONOCYTES NFR BLD AUTO: 10 % — HIGH (ref 1.7–9.3)
MONOCYTES NFR BLD AUTO: 10 % — HIGH (ref 1.7–9.3)
NEUTROPHILS # BLD AUTO: 2.93 K/UL — SIGNIFICANT CHANGE UP (ref 1.4–6.5)
NEUTROPHILS # BLD AUTO: 2.93 K/UL — SIGNIFICANT CHANGE UP (ref 1.4–6.5)
NEUTROPHILS NFR BLD AUTO: 56.5 % — SIGNIFICANT CHANGE UP (ref 42.2–75.2)
NEUTROPHILS NFR BLD AUTO: 56.5 % — SIGNIFICANT CHANGE UP (ref 42.2–75.2)
NRBC # BLD: 0 /100 WBCS — SIGNIFICANT CHANGE UP (ref 0–0)
NRBC # BLD: 0 /100 WBCS — SIGNIFICANT CHANGE UP (ref 0–0)
PLATELET # BLD AUTO: 116 K/UL — LOW (ref 130–400)
PLATELET # BLD AUTO: 116 K/UL — LOW (ref 130–400)
PMV BLD: 12.3 FL — HIGH (ref 7.4–10.4)
PMV BLD: 12.3 FL — HIGH (ref 7.4–10.4)
POTASSIUM SERPL-MCNC: 4.1 MMOL/L — SIGNIFICANT CHANGE UP (ref 3.5–5)
POTASSIUM SERPL-MCNC: 4.1 MMOL/L — SIGNIFICANT CHANGE UP (ref 3.5–5)
POTASSIUM SERPL-SCNC: 4.1 MMOL/L — SIGNIFICANT CHANGE UP (ref 3.5–5)
POTASSIUM SERPL-SCNC: 4.1 MMOL/L — SIGNIFICANT CHANGE UP (ref 3.5–5)
PROT SERPL-MCNC: 5.2 G/DL — LOW (ref 6–8)
PROT SERPL-MCNC: 5.2 G/DL — LOW (ref 6–8)
RBC # BLD: 3.75 M/UL — LOW (ref 4.7–6.1)
RBC # BLD: 3.75 M/UL — LOW (ref 4.7–6.1)
RBC # FLD: 12.9 % — SIGNIFICANT CHANGE UP (ref 11.5–14.5)
RBC # FLD: 12.9 % — SIGNIFICANT CHANGE UP (ref 11.5–14.5)
SODIUM SERPL-SCNC: 138 MMOL/L — SIGNIFICANT CHANGE UP (ref 135–146)
SODIUM SERPL-SCNC: 138 MMOL/L — SIGNIFICANT CHANGE UP (ref 135–146)
WBC # BLD: 5.18 K/UL — SIGNIFICANT CHANGE UP (ref 4.8–10.8)
WBC # BLD: 5.18 K/UL — SIGNIFICANT CHANGE UP (ref 4.8–10.8)
WBC # FLD AUTO: 5.18 K/UL — SIGNIFICANT CHANGE UP (ref 4.8–10.8)
WBC # FLD AUTO: 5.18 K/UL — SIGNIFICANT CHANGE UP (ref 4.8–10.8)

## 2023-12-15 PROCEDURE — 93306 TTE W/DOPPLER COMPLETE: CPT | Mod: 26

## 2023-12-15 PROCEDURE — 99239 HOSP IP/OBS DSCHRG MGMT >30: CPT

## 2023-12-15 RX ORDER — ASPIRIN/CALCIUM CARB/MAGNESIUM 324 MG
1 TABLET ORAL
Refills: 0 | DISCHARGE

## 2023-12-15 RX ORDER — DRONEDARONE 400 MG/1
1 TABLET, FILM COATED ORAL
Qty: 60 | Refills: 3
Start: 2023-12-15 | End: 2024-04-12

## 2023-12-15 RX ORDER — DRONEDARONE 400 MG/1
400 TABLET, FILM COATED ORAL
Refills: 0 | Status: DISCONTINUED | OUTPATIENT
Start: 2023-12-15 | End: 2023-12-15

## 2023-12-15 RX ORDER — METOPROLOL TARTRATE 50 MG
1 TABLET ORAL
Qty: 60 | Refills: 3
Start: 2023-12-15 | End: 2024-04-12

## 2023-12-15 RX ORDER — METOPROLOL TARTRATE 50 MG
1 TABLET ORAL
Qty: 120 | Refills: 3
Start: 2023-12-15 | End: 2024-04-12

## 2023-12-15 RX ORDER — APIXABAN 2.5 MG/1
1 TABLET, FILM COATED ORAL
Qty: 60 | Refills: 3
Start: 2023-12-15 | End: 2024-04-12

## 2023-12-15 RX ADMIN — PANTOPRAZOLE SODIUM 40 MILLIGRAM(S): 20 TABLET, DELAYED RELEASE ORAL at 05:01

## 2023-12-15 RX ADMIN — APIXABAN 5 MILLIGRAM(S): 2.5 TABLET, FILM COATED ORAL at 05:01

## 2023-12-15 RX ADMIN — Medication 2: at 11:24

## 2023-12-15 RX ADMIN — Medication 25 MILLIGRAM(S): at 05:00

## 2023-12-15 RX ADMIN — TAMSULOSIN HYDROCHLORIDE 0.4 MILLIGRAM(S): 0.4 CAPSULE ORAL at 05:01

## 2023-12-15 RX ADMIN — Medication 25 MILLIGRAM(S): at 11:27

## 2023-12-15 NOTE — DISCHARGE NOTE NURSING/CASE MANAGEMENT/SOCIAL WORK - PATIENT PORTAL LINK FT
You can access the FollowMyHealth Patient Portal offered by [facility] by registering at the following website: [email or web address]. By joining [employer]’s FollowMyHealth portal, you will also be able to view your health information using other applications (apps) compatible with our system. You can access the FollowMyHealth Patient Portal offered by [facility] by registering at the following website: [email or web address]. By joining [employer]’s FollowMyHealth portal, you will also be able to view your health information using other applications (apps) compatible with our system.

## 2023-12-15 NOTE — DISCHARGE NOTE NURSING/CASE MANAGEMENT/SOCIAL WORK - NSDCPEFALRISK_GEN_ALL_CORE
For information on Fall & Injury Prevention, visit: [email or web address] OR  [email or web address] OR  https://www.cdc.gov/steadi/patient.html For information on Fall & Injury Prevention, visit: [email or web address] OR  [email or web address] OR  https://www.cdc.gov/steadi/patient.html

## 2023-12-16 ENCOUNTER — INPATIENT (INPATIENT)
Facility: HOSPITAL | Age: 83
LOS: 2 days | Discharge: HOME | DRG: 309 | End: 2023-12-19
Attending: STUDENT IN AN ORGANIZED HEALTH CARE EDUCATION/TRAINING PROGRAM | Admitting: STUDENT IN AN ORGANIZED HEALTH CARE EDUCATION/TRAINING PROGRAM
Payer: MEDICARE

## 2023-12-16 VITALS
SYSTOLIC BLOOD PRESSURE: 113 MMHG | RESPIRATION RATE: 18 BRPM | HEART RATE: 61 BPM | WEIGHT: 199.96 LBS | TEMPERATURE: 98 F | HEIGHT: 73 IN | DIASTOLIC BLOOD PRESSURE: 63 MMHG | OXYGEN SATURATION: 98 %

## 2023-12-16 DIAGNOSIS — Z98.1 ARTHRODESIS STATUS: Chronic | ICD-10-CM

## 2023-12-16 DIAGNOSIS — I48.91 UNSPECIFIED ATRIAL FIBRILLATION: ICD-10-CM

## 2023-12-16 LAB
ALBUMIN SERPL ELPH-MCNC: 3.2 G/DL — LOW (ref 3.5–5.2)
ALBUMIN SERPL ELPH-MCNC: 3.2 G/DL — LOW (ref 3.5–5.2)
ALBUMIN SERPL ELPH-MCNC: 3.8 G/DL — SIGNIFICANT CHANGE UP (ref 3.5–5.2)
ALBUMIN SERPL ELPH-MCNC: 3.8 G/DL — SIGNIFICANT CHANGE UP (ref 3.5–5.2)
ALP SERPL-CCNC: 112 U/L — SIGNIFICANT CHANGE UP (ref 30–115)
ALP SERPL-CCNC: 112 U/L — SIGNIFICANT CHANGE UP (ref 30–115)
ALP SERPL-CCNC: 82 U/L — SIGNIFICANT CHANGE UP (ref 30–115)
ALP SERPL-CCNC: 82 U/L — SIGNIFICANT CHANGE UP (ref 30–115)
ALT FLD-CCNC: 19 U/L — SIGNIFICANT CHANGE UP (ref 0–41)
ALT FLD-CCNC: 19 U/L — SIGNIFICANT CHANGE UP (ref 0–41)
ALT FLD-CCNC: 24 U/L — SIGNIFICANT CHANGE UP (ref 0–41)
ALT FLD-CCNC: 24 U/L — SIGNIFICANT CHANGE UP (ref 0–41)
ANION GAP SERPL CALC-SCNC: 11 MMOL/L — SIGNIFICANT CHANGE UP (ref 7–14)
ANION GAP SERPL CALC-SCNC: 11 MMOL/L — SIGNIFICANT CHANGE UP (ref 7–14)
AST SERPL-CCNC: 18 U/L — SIGNIFICANT CHANGE UP (ref 0–41)
AST SERPL-CCNC: 18 U/L — SIGNIFICANT CHANGE UP (ref 0–41)
AST SERPL-CCNC: 25 U/L — SIGNIFICANT CHANGE UP (ref 0–41)
AST SERPL-CCNC: 25 U/L — SIGNIFICANT CHANGE UP (ref 0–41)
BASOPHILS # BLD AUTO: 0.03 K/UL — SIGNIFICANT CHANGE UP (ref 0–0.2)
BASOPHILS # BLD AUTO: 0.03 K/UL — SIGNIFICANT CHANGE UP (ref 0–0.2)
BASOPHILS NFR BLD AUTO: 0.5 % — SIGNIFICANT CHANGE UP (ref 0–1)
BASOPHILS NFR BLD AUTO: 0.5 % — SIGNIFICANT CHANGE UP (ref 0–1)
BILIRUB DIRECT SERPL-MCNC: <0.2 MG/DL — SIGNIFICANT CHANGE UP (ref 0–0.3)
BILIRUB DIRECT SERPL-MCNC: <0.2 MG/DL — SIGNIFICANT CHANGE UP (ref 0–0.3)
BILIRUB INDIRECT FLD-MCNC: >0.2 MG/DL — SIGNIFICANT CHANGE UP (ref 0.2–1.2)
BILIRUB INDIRECT FLD-MCNC: >0.2 MG/DL — SIGNIFICANT CHANGE UP (ref 0.2–1.2)
BILIRUB SERPL-MCNC: 0.4 MG/DL — SIGNIFICANT CHANGE UP (ref 0.2–1.2)
BILIRUB SERPL-MCNC: 0.4 MG/DL — SIGNIFICANT CHANGE UP (ref 0.2–1.2)
BILIRUB SERPL-MCNC: 0.6 MG/DL — SIGNIFICANT CHANGE UP (ref 0.2–1.2)
BILIRUB SERPL-MCNC: 0.6 MG/DL — SIGNIFICANT CHANGE UP (ref 0.2–1.2)
BUN SERPL-MCNC: 19 MG/DL — SIGNIFICANT CHANGE UP (ref 10–20)
BUN SERPL-MCNC: 19 MG/DL — SIGNIFICANT CHANGE UP (ref 10–20)
CALCIUM SERPL-MCNC: 9.1 MG/DL — SIGNIFICANT CHANGE UP (ref 8.4–10.5)
CALCIUM SERPL-MCNC: 9.1 MG/DL — SIGNIFICANT CHANGE UP (ref 8.4–10.5)
CHLORIDE SERPL-SCNC: 105 MMOL/L — SIGNIFICANT CHANGE UP (ref 98–110)
CHLORIDE SERPL-SCNC: 105 MMOL/L — SIGNIFICANT CHANGE UP (ref 98–110)
CO2 SERPL-SCNC: 19 MMOL/L — SIGNIFICANT CHANGE UP (ref 17–32)
CO2 SERPL-SCNC: 19 MMOL/L — SIGNIFICANT CHANGE UP (ref 17–32)
CREAT SERPL-MCNC: 1.1 MG/DL — SIGNIFICANT CHANGE UP (ref 0.7–1.5)
CREAT SERPL-MCNC: 1.1 MG/DL — SIGNIFICANT CHANGE UP (ref 0.7–1.5)
EGFR: 67 ML/MIN/1.73M2 — SIGNIFICANT CHANGE UP
EGFR: 67 ML/MIN/1.73M2 — SIGNIFICANT CHANGE UP
EOSINOPHIL # BLD AUTO: 0.18 K/UL — SIGNIFICANT CHANGE UP (ref 0–0.7)
EOSINOPHIL # BLD AUTO: 0.18 K/UL — SIGNIFICANT CHANGE UP (ref 0–0.7)
EOSINOPHIL NFR BLD AUTO: 3.1 % — SIGNIFICANT CHANGE UP (ref 0–8)
EOSINOPHIL NFR BLD AUTO: 3.1 % — SIGNIFICANT CHANGE UP (ref 0–8)
GAS PNL BLDV: SIGNIFICANT CHANGE UP
GAS PNL BLDV: SIGNIFICANT CHANGE UP
GLUCOSE BLDC GLUCOMTR-MCNC: 154 MG/DL — HIGH (ref 70–99)
GLUCOSE BLDC GLUCOMTR-MCNC: 154 MG/DL — HIGH (ref 70–99)
GLUCOSE BLDC GLUCOMTR-MCNC: 194 MG/DL — HIGH (ref 70–99)
GLUCOSE BLDC GLUCOMTR-MCNC: 194 MG/DL — HIGH (ref 70–99)
GLUCOSE SERPL-MCNC: 246 MG/DL — HIGH (ref 70–99)
GLUCOSE SERPL-MCNC: 246 MG/DL — HIGH (ref 70–99)
HCT VFR BLD CALC: 38.9 % — LOW (ref 42–52)
HCT VFR BLD CALC: 38.9 % — LOW (ref 42–52)
HGB BLD-MCNC: 13.5 G/DL — LOW (ref 14–18)
HGB BLD-MCNC: 13.5 G/DL — LOW (ref 14–18)
IMM GRANULOCYTES NFR BLD AUTO: 0.3 % — SIGNIFICANT CHANGE UP (ref 0.1–0.3)
IMM GRANULOCYTES NFR BLD AUTO: 0.3 % — SIGNIFICANT CHANGE UP (ref 0.1–0.3)
LYMPHOCYTES # BLD AUTO: 1.27 K/UL — SIGNIFICANT CHANGE UP (ref 1.2–3.4)
LYMPHOCYTES # BLD AUTO: 1.27 K/UL — SIGNIFICANT CHANGE UP (ref 1.2–3.4)
LYMPHOCYTES # BLD AUTO: 21.6 % — SIGNIFICANT CHANGE UP (ref 20.5–51.1)
LYMPHOCYTES # BLD AUTO: 21.6 % — SIGNIFICANT CHANGE UP (ref 20.5–51.1)
MCHC RBC-ENTMCNC: 31.1 PG — HIGH (ref 27–31)
MCHC RBC-ENTMCNC: 31.1 PG — HIGH (ref 27–31)
MCHC RBC-ENTMCNC: 34.7 G/DL — SIGNIFICANT CHANGE UP (ref 32–37)
MCHC RBC-ENTMCNC: 34.7 G/DL — SIGNIFICANT CHANGE UP (ref 32–37)
MCV RBC AUTO: 89.6 FL — SIGNIFICANT CHANGE UP (ref 80–94)
MCV RBC AUTO: 89.6 FL — SIGNIFICANT CHANGE UP (ref 80–94)
MONOCYTES # BLD AUTO: 0.52 K/UL — SIGNIFICANT CHANGE UP (ref 0.1–0.6)
MONOCYTES # BLD AUTO: 0.52 K/UL — SIGNIFICANT CHANGE UP (ref 0.1–0.6)
MONOCYTES NFR BLD AUTO: 8.8 % — SIGNIFICANT CHANGE UP (ref 1.7–9.3)
MONOCYTES NFR BLD AUTO: 8.8 % — SIGNIFICANT CHANGE UP (ref 1.7–9.3)
NEUTROPHILS # BLD AUTO: 3.86 K/UL — SIGNIFICANT CHANGE UP (ref 1.4–6.5)
NEUTROPHILS # BLD AUTO: 3.86 K/UL — SIGNIFICANT CHANGE UP (ref 1.4–6.5)
NEUTROPHILS NFR BLD AUTO: 65.7 % — SIGNIFICANT CHANGE UP (ref 42.2–75.2)
NEUTROPHILS NFR BLD AUTO: 65.7 % — SIGNIFICANT CHANGE UP (ref 42.2–75.2)
NRBC # BLD: 0 /100 WBCS — SIGNIFICANT CHANGE UP (ref 0–0)
NRBC # BLD: 0 /100 WBCS — SIGNIFICANT CHANGE UP (ref 0–0)
PLATELET # BLD AUTO: 134 K/UL — SIGNIFICANT CHANGE UP (ref 130–400)
PLATELET # BLD AUTO: 134 K/UL — SIGNIFICANT CHANGE UP (ref 130–400)
PMV BLD: 12.3 FL — HIGH (ref 7.4–10.4)
PMV BLD: 12.3 FL — HIGH (ref 7.4–10.4)
POTASSIUM SERPL-MCNC: 4.5 MMOL/L — SIGNIFICANT CHANGE UP (ref 3.5–5)
POTASSIUM SERPL-MCNC: 4.5 MMOL/L — SIGNIFICANT CHANGE UP (ref 3.5–5)
POTASSIUM SERPL-SCNC: 4.5 MMOL/L — SIGNIFICANT CHANGE UP (ref 3.5–5)
POTASSIUM SERPL-SCNC: 4.5 MMOL/L — SIGNIFICANT CHANGE UP (ref 3.5–5)
PROT SERPL-MCNC: 5.3 G/DL — LOW (ref 6–8)
PROT SERPL-MCNC: 5.3 G/DL — LOW (ref 6–8)
PROT SERPL-MCNC: 6.3 G/DL — SIGNIFICANT CHANGE UP (ref 6–8)
PROT SERPL-MCNC: 6.3 G/DL — SIGNIFICANT CHANGE UP (ref 6–8)
RBC # BLD: 4.34 M/UL — LOW (ref 4.7–6.1)
RBC # BLD: 4.34 M/UL — LOW (ref 4.7–6.1)
RBC # FLD: 12.8 % — SIGNIFICANT CHANGE UP (ref 11.5–14.5)
RBC # FLD: 12.8 % — SIGNIFICANT CHANGE UP (ref 11.5–14.5)
SODIUM SERPL-SCNC: 135 MMOL/L — SIGNIFICANT CHANGE UP (ref 135–146)
SODIUM SERPL-SCNC: 135 MMOL/L — SIGNIFICANT CHANGE UP (ref 135–146)
TROPONIN T, HIGH SENSITIVITY RESULT: 86 NG/L — CRITICAL HIGH (ref 6–21)
TROPONIN T, HIGH SENSITIVITY RESULT: 86 NG/L — CRITICAL HIGH (ref 6–21)
TROPONIN T, HIGH SENSITIVITY RESULT: 96 NG/L — CRITICAL HIGH (ref 6–21)
TROPONIN T, HIGH SENSITIVITY RESULT: 96 NG/L — CRITICAL HIGH (ref 6–21)
WBC # BLD: 5.88 K/UL — SIGNIFICANT CHANGE UP (ref 4.8–10.8)
WBC # BLD: 5.88 K/UL — SIGNIFICANT CHANGE UP (ref 4.8–10.8)
WBC # FLD AUTO: 5.88 K/UL — SIGNIFICANT CHANGE UP (ref 4.8–10.8)
WBC # FLD AUTO: 5.88 K/UL — SIGNIFICANT CHANGE UP (ref 4.8–10.8)

## 2023-12-16 PROCEDURE — 80076 HEPATIC FUNCTION PANEL: CPT

## 2023-12-16 PROCEDURE — 71045 X-RAY EXAM CHEST 1 VIEW: CPT | Mod: 26

## 2023-12-16 PROCEDURE — 36415 COLL VENOUS BLD VENIPUNCTURE: CPT

## 2023-12-16 PROCEDURE — 93005 ELECTROCARDIOGRAM TRACING: CPT

## 2023-12-16 PROCEDURE — 85027 COMPLETE CBC AUTOMATED: CPT

## 2023-12-16 PROCEDURE — 93010 ELECTROCARDIOGRAM REPORT: CPT

## 2023-12-16 PROCEDURE — 93010 ELECTROCARDIOGRAM REPORT: CPT | Mod: 77

## 2023-12-16 PROCEDURE — 85730 THROMBOPLASTIN TIME PARTIAL: CPT

## 2023-12-16 PROCEDURE — 80048 BASIC METABOLIC PNL TOTAL CA: CPT

## 2023-12-16 PROCEDURE — 84484 ASSAY OF TROPONIN QUANT: CPT

## 2023-12-16 PROCEDURE — 92960 CARDIOVERSION ELECTRIC EXT: CPT

## 2023-12-16 PROCEDURE — 85610 PROTHROMBIN TIME: CPT

## 2023-12-16 PROCEDURE — 82962 GLUCOSE BLOOD TEST: CPT

## 2023-12-16 PROCEDURE — 83735 ASSAY OF MAGNESIUM: CPT

## 2023-12-16 PROCEDURE — 85025 COMPLETE CBC W/AUTO DIFF WBC: CPT

## 2023-12-16 PROCEDURE — 99291 CRITICAL CARE FIRST HOUR: CPT

## 2023-12-16 RX ORDER — SODIUM CHLORIDE 9 MG/ML
1000 INJECTION, SOLUTION INTRAVENOUS
Refills: 0 | Status: DISCONTINUED | OUTPATIENT
Start: 2023-12-16 | End: 2023-12-19

## 2023-12-16 RX ORDER — DILTIAZEM HCL 120 MG
15 CAPSULE, EXT RELEASE 24 HR ORAL ONCE
Refills: 0 | Status: COMPLETED | OUTPATIENT
Start: 2023-12-16 | End: 2023-12-16

## 2023-12-16 RX ORDER — GLUCAGON INJECTION, SOLUTION 0.5 MG/.1ML
1 INJECTION, SOLUTION SUBCUTANEOUS ONCE
Refills: 0 | Status: DISCONTINUED | OUTPATIENT
Start: 2023-12-16 | End: 2023-12-19

## 2023-12-16 RX ORDER — DILTIAZEM HCL 120 MG
15 CAPSULE, EXT RELEASE 24 HR ORAL ONCE
Refills: 0 | Status: DISCONTINUED | OUTPATIENT
Start: 2023-12-16 | End: 2023-12-16

## 2023-12-16 RX ORDER — ATORVASTATIN CALCIUM 80 MG/1
40 TABLET, FILM COATED ORAL AT BEDTIME
Refills: 0 | Status: DISCONTINUED | OUTPATIENT
Start: 2023-12-16 | End: 2023-12-19

## 2023-12-16 RX ORDER — PANTOPRAZOLE SODIUM 20 MG/1
40 TABLET, DELAYED RELEASE ORAL
Refills: 0 | Status: DISCONTINUED | OUTPATIENT
Start: 2023-12-16 | End: 2023-12-19

## 2023-12-16 RX ORDER — AMIODARONE HYDROCHLORIDE 400 MG/1
1 TABLET ORAL
Qty: 450 | Refills: 0 | Status: DISCONTINUED | OUTPATIENT
Start: 2023-12-16 | End: 2023-12-18

## 2023-12-16 RX ORDER — DEXTROSE 50 % IN WATER 50 %
15 SYRINGE (ML) INTRAVENOUS ONCE
Refills: 0 | Status: DISCONTINUED | OUTPATIENT
Start: 2023-12-16 | End: 2023-12-19

## 2023-12-16 RX ORDER — AMIODARONE HYDROCHLORIDE 400 MG/1
0.5 TABLET ORAL
Qty: 450 | Refills: 0 | Status: DISCONTINUED | OUTPATIENT
Start: 2023-12-16 | End: 2023-12-18

## 2023-12-16 RX ORDER — DEXTROSE 50 % IN WATER 50 %
25 SYRINGE (ML) INTRAVENOUS ONCE
Refills: 0 | Status: DISCONTINUED | OUTPATIENT
Start: 2023-12-16 | End: 2023-12-19

## 2023-12-16 RX ORDER — INSULIN LISPRO 100/ML
VIAL (ML) SUBCUTANEOUS
Refills: 0 | Status: DISCONTINUED | OUTPATIENT
Start: 2023-12-16 | End: 2023-12-19

## 2023-12-16 RX ORDER — APIXABAN 2.5 MG/1
5 TABLET, FILM COATED ORAL EVERY 12 HOURS
Refills: 0 | Status: DISCONTINUED | OUTPATIENT
Start: 2023-12-16 | End: 2023-12-19

## 2023-12-16 RX ORDER — MAGNESIUM SULFATE 500 MG/ML
2 VIAL (ML) INJECTION ONCE
Refills: 0 | Status: COMPLETED | OUTPATIENT
Start: 2023-12-16 | End: 2023-12-16

## 2023-12-16 RX ORDER — SODIUM CHLORIDE 9 MG/ML
1000 INJECTION, SOLUTION INTRAVENOUS ONCE
Refills: 0 | Status: COMPLETED | OUTPATIENT
Start: 2023-12-16 | End: 2023-12-16

## 2023-12-16 RX ORDER — TAMSULOSIN HYDROCHLORIDE 0.4 MG/1
0.4 CAPSULE ORAL
Refills: 0 | Status: DISCONTINUED | OUTPATIENT
Start: 2023-12-16 | End: 2023-12-19

## 2023-12-16 RX ORDER — DEXTROSE 50 % IN WATER 50 %
12.5 SYRINGE (ML) INTRAVENOUS ONCE
Refills: 0 | Status: DISCONTINUED | OUTPATIENT
Start: 2023-12-16 | End: 2023-12-19

## 2023-12-16 RX ORDER — AMIODARONE HYDROCHLORIDE 400 MG/1
150 TABLET ORAL ONCE
Refills: 0 | Status: COMPLETED | OUTPATIENT
Start: 2023-12-16 | End: 2023-12-16

## 2023-12-16 RX ORDER — TAMSULOSIN HYDROCHLORIDE 0.4 MG/1
0.8 CAPSULE ORAL AT BEDTIME
Refills: 0 | Status: DISCONTINUED | OUTPATIENT
Start: 2023-12-16 | End: 2023-12-16

## 2023-12-16 RX ORDER — INSULIN LISPRO 100/ML
VIAL (ML) SUBCUTANEOUS AT BEDTIME
Refills: 0 | Status: DISCONTINUED | OUTPATIENT
Start: 2023-12-16 | End: 2023-12-19

## 2023-12-16 RX ADMIN — Medication 15 MILLIGRAM(S): at 12:38

## 2023-12-16 RX ADMIN — AMIODARONE HYDROCHLORIDE 33.3 MG/MIN: 400 TABLET ORAL at 15:20

## 2023-12-16 RX ADMIN — TAMSULOSIN HYDROCHLORIDE 0.4 MILLIGRAM(S): 0.4 CAPSULE ORAL at 18:44

## 2023-12-16 RX ADMIN — Medication 25 GRAM(S): at 12:38

## 2023-12-16 RX ADMIN — SODIUM CHLORIDE 1000 MILLILITER(S): 9 INJECTION, SOLUTION INTRAVENOUS at 14:05

## 2023-12-16 RX ADMIN — APIXABAN 5 MILLIGRAM(S): 2.5 TABLET, FILM COATED ORAL at 21:05

## 2023-12-16 RX ADMIN — AMIODARONE HYDROCHLORIDE 16.7 MG/MIN: 400 TABLET ORAL at 22:00

## 2023-12-16 RX ADMIN — Medication 1: at 18:35

## 2023-12-16 RX ADMIN — ATORVASTATIN CALCIUM 40 MILLIGRAM(S): 80 TABLET, FILM COATED ORAL at 21:06

## 2023-12-16 RX ADMIN — AMIODARONE HYDROCHLORIDE 600 MILLIGRAM(S): 400 TABLET ORAL at 14:29

## 2023-12-16 NOTE — ED ADULT NURSE NOTE - CHIEF COMPLAINT QUOTE
pt was here on wednesday w sob and elevated HR. pt presents today w CP and sob on exertion. pt w hx of afib and a flutter

## 2023-12-16 NOTE — ED PROVIDER NOTE - PROGRESS NOTE DETAILS
Authored by Dr. Nickerson: pt initially given Cardizem but remained in A-fib hence given amiodarone push followed by amiodarone drip.  Heart rate currently in the 130s and blood pressure of 102/75, will be admitted. Pt currently has no complaints and feels better.

## 2023-12-16 NOTE — H&P ADULT - HISTORY OF PRESENT ILLNESS
83 year old male patient of Adrianne Fabian with PMHx of HTN, HLD, DM, Paroxysmal AFib, initially diagnosed in 1994,  BPH, GERD, and prostate CA, s/p recent admission had Aflutter cardioverted on 12/14 and discharged yesterday 12/15, presents with SOB, palpitations, dizziness  this morning. Patient was seen by EP and was  discharged on Eliquis , metoprolol 25 q12 and Multaq 400 mg bid, out of which patient took Eliquis this AM, and took last dose of metoprolol yesterday evening, however did not take Multaq yet due to potential interaction with Simvastatin. Patient denies chest pain, cough, URI symptoms, abdominal pain, n/v.     Patient also was admitted at end of June for similar symptoms, was Cardioverted by EMS for "SVT". Pt was seen by  outpatient afterwards, and had an MCOT placed. As per patient he was diagnosed with Afib 30 years ago but never bothered him until this June.        83 year old male patient of Adrianne Fabian with PMHx of HTN, HLD, DM, Paroxysmal AFib initially diagnosed in 1994, SVT,  BPH, GERD, and prostate CA, s/p recent admission had Aflutter cardioverted on 12/14 (200J) and discharged yesterday 12/15, presents with SOB, palpitations, dizziness  this morning. Patient was seen by EP and was  discharged on Eliquis , metoprolol 25 q12 and Multaq 400 mg bid, out of which patient took Eliquis this AM, and took last dose of metoprolol yesterday evening, however did not take Multaq yet due to potential interaction with Simvastatin. Patient denies chest pain, cough, URI symptoms, abdominal pain, n/v.     Patient also was admitted at end of June for similar symptoms, found to be in SVT,  was given Adenosine, and was  Cardioverted with 100J by EMS. After his hospitalization, he was discharged with an MCOT and found to have two episodes of SVT. Since then no other arrhythmia. ILR was offered in the outpatient setting (EP- ) , and was declined.

## 2023-12-16 NOTE — ED PROVIDER NOTE - PHYSICAL EXAMINATION
VITAL SIGNS: I have reviewed nursing notes and confirm.  CONSTITUTIONAL: non-toxic, well appearing  SKIN: no rash, no petechiae.  EYES: pink conjunctiva, anicteric  ENT: tongue midline, no exudates, MMM  NECK: Supple; no meningismus  CARD: + tachycardia w irregular rhythm, no murmurs, equal radial pulses bilaterally 2+  RESP: CTAB, no respiratory distress  ABD: Soft, non-tender, non-distended  EXT: Normal ROM x4. No edema. No calves tenderness  NEURO: Alert, oriented. CN2-12 intact, equal strength bilaterally  PSYCH: Cooperative, appropriate.

## 2023-12-16 NOTE — ED PROVIDER NOTE - OBJECTIVE STATEMENT
83yoM, PMH of DM, HTN s/p cardioversion on Thursday. BIBEMS presents due to SOB and palpitations x 7 hours. Pt. takes elequis and metoprolol ( did not take today). Pt. denies fever, cough, n/v, chest pain, abd pain. 82 y/o M PMHx of DM, HTN s/p recent admission for Afib/Aflutter with cardioversion on Thursday and discharged on Friday d/c on eliquis and metoprolol BIBEMS presents due to SOB and palpitations x 7 hours. Pt took eliquis today, but not metoprolol. Pt had GANGA on last admission w no evidence of clot. Denies fever, cough, n/v, chest pain, abd pain. Pt was supposed to take multaq, but did not bc he was concerned about medication interactions. 84 y/o M PMHx of DM, HTN s/p recent admission for Afib/Aflutter with cardioversion on Thursday and discharged on Friday d/c on eliquis and metoprolol BIBEMS presents due to SOB and palpitations x 7 hours. Pt took eliquis today, but not metoprolol. Pt had GANGA on last admission w no evidence of clot. Denies fever, cough, n/v, chest pain, abd pain. Pt was supposed to take multaq, but did not bc he was concerned about medication interactions.

## 2023-12-16 NOTE — ED PROVIDER NOTE - CONSIDERATION OF ADMISSION OBSERVATION
Patient requires admission/observation for further workup and monitoring. Consideration of Admission/Observation V-Y Plasty Text: The defect edges were debeveled with a #15 scalpel blade.  Given the location of the defect, shape of the defect and the proximity to free margins an V-Y advancement flap was deemed most appropriate.  Using a sterile surgical marker, an appropriate advancement flap was drawn incorporating the defect and placing the expected incisions within the relaxed skin tension lines where possible.    The area thus outlined was incised deep to adipose tissue with a #15 scalpel blade.  The skin margins were undermined to an appropriate distance in all directions utilizing iris scissors.

## 2023-12-16 NOTE — H&P ADULT - NSHPLABSRESULTS_GEN_ALL_CORE
- ECG 12/16/23 aflutter with variable AV block @ 141 , RBBB, QTC  465 ms        - Labs:                        13.5   5.88  )-----------( 134      ( 16 Dec 2023 12:35 )             38.9     12-16    135  |  105  |  19  ----------------------------<  246<H>  4.5   |  19  |  1.1    Ca    9.1      16 Dec 2023 12:35  Mg     1.9     12-15    TPro  6.3  /  Alb  3.8  /  TBili  0.6  /  DBili  x   /  AST  25  /  ALT  24  /  AlkPhos  112  12-16    LIVER FUNCTIONS - ( 16 Dec 2023 12:35 )  Alb: 3.8 g/dL / Pro: 6.3 g/dL / ALK PHOS: 112 U/L / ALT: 24 U/L / AST: 25 U/L / GGT: x           LA 1.6 on VBG

## 2023-12-16 NOTE — ED PROVIDER NOTE - INTERNATIONAL TRAVEL
No 79y male above PMH BIBEMS after feeling weak and falling no head trauma no loc was unable to stand and pressed his life alert, c/o diarrhea, lives alone with visiting aides, family out of town, usually ambulatory with cane but has been declining of late, on exam vital signs appreciated, pt AAO x 3 nontoxic but chronically ill appearing covered in feces, head nc/at, perrla, ptosis OD, conj pink dry mm neck supple  cor rrr lungs with coarse bs on left no w/r/r abd +bs, soft with suprapubic ttp and diastasis, SPT site c/d/i draining jen urine, pelvis stable FROM x 4 neuro nonfocal, will check labs, imaging, IVF, reassess

## 2023-12-16 NOTE — H&P ADULT - NS ATTEND AMEND GEN_ALL_CORE FT
Patient seen and examined. Pertinent labs, imaging and telemetry reviewed. I agree with the above:     Patient feeling better. SOB has improved. Pt did not take Multaq as pharmacist had said there is possible interaction with simvastatin.   -Pt found to be back in Aflutter HR 130s. STarted on amiodarone.  -Remains in Aflutter.   -Given recent GANGA, pt can undergo DCCV as he has not missed any doses of AC.   -Complete 24 hour IV amiodarone load.  -Continue amidoarone 200mg PO BID.   -Will drop in about 2 weeks.   -NPO after MN on Sunday for DCCV.   -If does not convert, will consult EP for possible AFlutter ablation.   -Will transition from simvastatin to atorvastatin.    Discussed with patient and ACP.

## 2023-12-16 NOTE — PATIENT PROFILE ADULT - FALL HARM RISK - HARM RISK INTERVENTIONS
Assistance with ambulation/Communicate Risk of Fall with Harm to all staff/Reinforce activity limits and safety measures with patient and family/Tailored Fall Risk Interventions/Visual Cue: Yellow wristband and red socks/Bed in lowest position, wheels locked, appropriate side rails in place/Call bell, personal items and telephone in reach/Instruct patient to call for assistance before getting out of bed or chair/Non-slip footwear when patient is out of bed/[place] to call system/Physically safe environment - no spills, clutter or unnecessary equipment/Purposeful Proactive Rounding/Room/bathroom lighting operational, light cord in reach Assistance with ambulation/Communicate Risk of Fall with Harm to all staff/Reinforce activity limits and safety measures with patient and family/Tailored Fall Risk Interventions/Visual Cue: Yellow wristband and red socks/Bed in lowest position, wheels locked, appropriate side rails in place/Call bell, personal items and telephone in reach/Instruct patient to call for assistance before getting out of bed or chair/Non-slip footwear when patient is out of bed/[place] to call system/Physically safe environment - no spills, clutter or unnecessary equipment/Purposeful Proactive Rounding/Room/bathroom lighting operational, light cord in reach

## 2023-12-16 NOTE — H&P ADULT - ASSESSMENT
83 year old male patient of Adrianne Fabian with PMHx of HTN, HLD, DM, Paroxysmal AFib initially diagnosed in 1994, SVT,  BPH, GERD, and prostate CA, s/p recent admission had Aflutter cardioverted on 12/14 (200J) and discharged yesterday 12/15, presents with SOB, palpitations, dizziness  this morning, found to be in Aflutter with variable RVR, -140's. Patient was given IV Diltiazem 15 mg in the ED, gave Amiodarone 150 mg IVP, and was started on Amio drip in the ED. Patient reports he took Eliquis this AM.     Vital Signs Last 24 Hrs  T(C): 36.8 (16 Dec 2023 17:24), Max: 36.8 (16 Dec 2023 17:24)  T(F): 98.2 (16 Dec 2023 17:24), Max: 98.2 (16 Dec 2023 17:24)  HR: 128 (16 Dec 2023 17:24) (61 - 134)  BP: 115/87 (16 Dec 2023 17:24) (96/62 - 122/85)  BP(mean): 80 (16 Dec 2023 14:06) (75 - 80)  RR: 18 (16 Dec 2023 17:24) (18 - 18)  SpO2: 97% (16 Dec 2023 17:24) (97% - 99%)    Parameters below as of 16 Dec 2023 17:24  Patient On (Oxygen Delivery Method): room air      Problems discussed and associated plan:    #Afib w/ RVR  - CHADSVASC score of 4  - Hold Metoprolol and Multaq  - s/p  IV Diltiazem 15 mg in the ED  - s/p IVP Amiodarone 150 mg IVP and started on  IV Amiodarone drip in the ED   - Consider EP c/s in the AM   - TTE 12/15   EF 45 to 50%.Spectral Doppler shows impaired relaxation pattern of left ventricular myocardial filling (Grade I diastolic dysfunction). Mild mitral valve regurgitation. Mild aortic regurgitation.  -  Lexiscan 12/14 negative for ischemia   - Trop 96 on admission, trend trops   - recently TFTs wnl   - Might need GANGA/CV if does not convert to NSR with medications       #HTN  - BP borderline low     #HLD  - home med: simvastatin 40 mg qhs   - c/w atorvastatin 40 mg qhs   - LDL 56 , Tri 259    #BPH  - continue on flomax 0.4 mg BID (home med)     #GERD  - home med: omeprazole 20 mg  - continue on protonix 40 mg daily     #DM  -A1C 12/13 7.7   - monitor FS AC/HS  -  insulin sliding scale inpatient     FULL CODE  PT: IAT  Diet: consistent carb w/ snacks       Please contact me with any questions or concerns at [phone].   83 year old male patient of Adrianne Fabian with PMHx of HTN, HLD, DM, Paroxysmal AFib initially diagnosed in 1994, SVT,  BPH, GERD, and prostate CA, s/p recent admission had Aflutter cardioverted on 12/14 (200J) and discharged yesterday 12/15, presents with SOB, palpitations, dizziness  this morning, found to be in Aflutter with variable RVR, -140's. Patient was given IV Diltiazem 15 mg in the ED, gave Amiodarone 150 mg IVP, and was started on Amio drip in the ED. Patient reports he took Eliquis this AM.     Vital Signs Last 24 Hrs  T(C): 36.8 (16 Dec 2023 17:24), Max: 36.8 (16 Dec 2023 17:24)  T(F): 98.2 (16 Dec 2023 17:24), Max: 98.2 (16 Dec 2023 17:24)  HR: 128 (16 Dec 2023 17:24) (61 - 134)  BP: 115/87 (16 Dec 2023 17:24) (96/62 - 122/85)  BP(mean): 80 (16 Dec 2023 14:06) (75 - 80)  RR: 18 (16 Dec 2023 17:24) (18 - 18)  SpO2: 97% (16 Dec 2023 17:24) (97% - 99%)    Parameters below as of 16 Dec 2023 17:24  Patient On (Oxygen Delivery Method): room air      Problems discussed and associated plan:    #Afib w/ RVR  - CHADSVASC score of 4  - Hold Metoprolol and Multaq  - s/p  IV Diltiazem 15 mg in the ED  - s/p IVP Amiodarone 150 mg IVP and started on  IV Amiodarone drip in the ED   - Consider EP c/s in the AM   - TTE 12/15   EF 45 to 50%.Spectral Doppler shows impaired relaxation pattern of left ventricular myocardial filling (Grade I diastolic dysfunction). Mild mitral valve regurgitation. Mild aortic regurgitation.  -  Lexiscan 12/14 negative for ischemia   - Trop 96 on admission, trend trops   - recently TFTs wnl   - Might need GANGA/CV if does not convert to NSR with medications       #HTN  - BP borderline low     #HLD  - home med: simvastatin 40 mg qhs   - c/w atorvastatin 40 mg qhs   - LDL 56 , Tri 259    #BPH  - continue on flomax 0.4 mg BID (home med)     #GERD  - home med: omeprazole 20 mg  - continue on protonix 40 mg daily     #DM  -A1C 12/13 7.7   - monitor FS AC/HS  -  insulin sliding scale inpatient     FULL CODE  PT: IAT  Diet: consistent carb w/ snacks       Please contact me with any questions or concerns at [phone].

## 2023-12-16 NOTE — ED PROVIDER NOTE - CLINICAL SUMMARY MEDICAL DECISION MAKING FREE TEXT BOX
pt presents to ed back in AF RVR after recent GANGA cardioversion  dilt initially given but SBP softened to ~90s w/o significant improvement in HR  HR ~130s and improving to 120s after amio bolus and drip, BP remained stable  case d/w cardio tele who rec continue amio drip and admission to their service for further care

## 2023-12-16 NOTE — ED PROVIDER NOTE - ATTENDING CONTRIBUTION TO CARE
84 y/o M PMHx of DM, HTN s/p recent admission for Afib/Aflutter with cardioversion on Thursday and discharged on Friday d/c on eliquis and metoprolol BIBEMS presents due to SOB and palpitations x 7 hours. pt was dc on metoprolol and multaq. pt took metoprolol last night but did not take multaq (pharmacist advised speaking to his cardiologist because he is on a statin). pt states he started feeling palpitations and mild SOB. no chest pain/pressure. no syncope/trauma/head injury/focal numbness/focal weakness.      vs tachy, irregular   gen- NAD, aaox3  card-tachy, irregular  lungs-ctab, no wheezing or rhonchi  abd-sntnd, no guarding or rebound  neuro- full str/sensation, cn ii-xii grossly intact, normal coordination   LE- no calf pain/swelling/tenderness b/l

## 2023-12-17 LAB
ANION GAP SERPL CALC-SCNC: 10 MMOL/L — SIGNIFICANT CHANGE UP (ref 7–14)
BASOPHILS # BLD AUTO: 0.03 K/UL — SIGNIFICANT CHANGE UP (ref 0–0.2)
BASOPHILS # BLD AUTO: 0.03 K/UL — SIGNIFICANT CHANGE UP (ref 0–0.2)
BASOPHILS NFR BLD AUTO: 0.5 % — SIGNIFICANT CHANGE UP (ref 0–1)
BASOPHILS NFR BLD AUTO: 0.5 % — SIGNIFICANT CHANGE UP (ref 0–1)
BUN SERPL-MCNC: 13 MG/DL — SIGNIFICANT CHANGE UP (ref 10–20)
BUN SERPL-MCNC: 13 MG/DL — SIGNIFICANT CHANGE UP (ref 10–20)
BUN SERPL-MCNC: 14 MG/DL — SIGNIFICANT CHANGE UP (ref 10–20)
BUN SERPL-MCNC: 14 MG/DL — SIGNIFICANT CHANGE UP (ref 10–20)
CALCIUM SERPL-MCNC: 8.4 MG/DL — SIGNIFICANT CHANGE UP (ref 8.4–10.5)
CALCIUM SERPL-MCNC: 8.4 MG/DL — SIGNIFICANT CHANGE UP (ref 8.4–10.5)
CALCIUM SERPL-MCNC: 8.5 MG/DL — SIGNIFICANT CHANGE UP (ref 8.4–10.5)
CALCIUM SERPL-MCNC: 8.5 MG/DL — SIGNIFICANT CHANGE UP (ref 8.4–10.5)
CHLORIDE SERPL-SCNC: 106 MMOL/L — SIGNIFICANT CHANGE UP (ref 98–110)
CHLORIDE SERPL-SCNC: 106 MMOL/L — SIGNIFICANT CHANGE UP (ref 98–110)
CHLORIDE SERPL-SCNC: 109 MMOL/L — SIGNIFICANT CHANGE UP (ref 98–110)
CHLORIDE SERPL-SCNC: 109 MMOL/L — SIGNIFICANT CHANGE UP (ref 98–110)
CO2 SERPL-SCNC: 21 MMOL/L — SIGNIFICANT CHANGE UP (ref 17–32)
CREAT SERPL-MCNC: 0.8 MG/DL — SIGNIFICANT CHANGE UP (ref 0.7–1.5)
CREAT SERPL-MCNC: 0.8 MG/DL — SIGNIFICANT CHANGE UP (ref 0.7–1.5)
CREAT SERPL-MCNC: 0.9 MG/DL — SIGNIFICANT CHANGE UP (ref 0.7–1.5)
CREAT SERPL-MCNC: 0.9 MG/DL — SIGNIFICANT CHANGE UP (ref 0.7–1.5)
EGFR: 85 ML/MIN/1.73M2 — SIGNIFICANT CHANGE UP
EGFR: 85 ML/MIN/1.73M2 — SIGNIFICANT CHANGE UP
EGFR: 88 ML/MIN/1.73M2 — SIGNIFICANT CHANGE UP
EGFR: 88 ML/MIN/1.73M2 — SIGNIFICANT CHANGE UP
EOSINOPHIL # BLD AUTO: 0.29 K/UL — SIGNIFICANT CHANGE UP (ref 0–0.7)
EOSINOPHIL # BLD AUTO: 0.29 K/UL — SIGNIFICANT CHANGE UP (ref 0–0.7)
EOSINOPHIL NFR BLD AUTO: 5.1 % — SIGNIFICANT CHANGE UP (ref 0–8)
EOSINOPHIL NFR BLD AUTO: 5.1 % — SIGNIFICANT CHANGE UP (ref 0–8)
GLUCOSE BLDC GLUCOMTR-MCNC: 160 MG/DL — HIGH (ref 70–99)
GLUCOSE BLDC GLUCOMTR-MCNC: 160 MG/DL — HIGH (ref 70–99)
GLUCOSE BLDC GLUCOMTR-MCNC: 211 MG/DL — HIGH (ref 70–99)
GLUCOSE BLDC GLUCOMTR-MCNC: 211 MG/DL — HIGH (ref 70–99)
GLUCOSE BLDC GLUCOMTR-MCNC: 238 MG/DL — HIGH (ref 70–99)
GLUCOSE BLDC GLUCOMTR-MCNC: 238 MG/DL — HIGH (ref 70–99)
GLUCOSE BLDC GLUCOMTR-MCNC: 248 MG/DL — HIGH (ref 70–99)
GLUCOSE BLDC GLUCOMTR-MCNC: 248 MG/DL — HIGH (ref 70–99)
GLUCOSE SERPL-MCNC: 163 MG/DL — HIGH (ref 70–99)
GLUCOSE SERPL-MCNC: 163 MG/DL — HIGH (ref 70–99)
GLUCOSE SERPL-MCNC: 235 MG/DL — HIGH (ref 70–99)
GLUCOSE SERPL-MCNC: 235 MG/DL — HIGH (ref 70–99)
HCT VFR BLD CALC: 35.1 % — LOW (ref 42–52)
HCT VFR BLD CALC: 35.1 % — LOW (ref 42–52)
HGB BLD-MCNC: 12 G/DL — LOW (ref 14–18)
HGB BLD-MCNC: 12 G/DL — LOW (ref 14–18)
IMM GRANULOCYTES NFR BLD AUTO: 0.3 % — SIGNIFICANT CHANGE UP (ref 0.1–0.3)
IMM GRANULOCYTES NFR BLD AUTO: 0.3 % — SIGNIFICANT CHANGE UP (ref 0.1–0.3)
LYMPHOCYTES # BLD AUTO: 1.77 K/UL — SIGNIFICANT CHANGE UP (ref 1.2–3.4)
LYMPHOCYTES # BLD AUTO: 1.77 K/UL — SIGNIFICANT CHANGE UP (ref 1.2–3.4)
LYMPHOCYTES # BLD AUTO: 30.9 % — SIGNIFICANT CHANGE UP (ref 20.5–51.1)
LYMPHOCYTES # BLD AUTO: 30.9 % — SIGNIFICANT CHANGE UP (ref 20.5–51.1)
MAGNESIUM SERPL-MCNC: 2 MG/DL — SIGNIFICANT CHANGE UP (ref 1.8–2.4)
MAGNESIUM SERPL-MCNC: 2 MG/DL — SIGNIFICANT CHANGE UP (ref 1.8–2.4)
MCHC RBC-ENTMCNC: 30.8 PG — SIGNIFICANT CHANGE UP (ref 27–31)
MCHC RBC-ENTMCNC: 30.8 PG — SIGNIFICANT CHANGE UP (ref 27–31)
MCHC RBC-ENTMCNC: 34.2 G/DL — SIGNIFICANT CHANGE UP (ref 32–37)
MCHC RBC-ENTMCNC: 34.2 G/DL — SIGNIFICANT CHANGE UP (ref 32–37)
MCV RBC AUTO: 90.2 FL — SIGNIFICANT CHANGE UP (ref 80–94)
MCV RBC AUTO: 90.2 FL — SIGNIFICANT CHANGE UP (ref 80–94)
MONOCYTES # BLD AUTO: 0.54 K/UL — SIGNIFICANT CHANGE UP (ref 0.1–0.6)
MONOCYTES # BLD AUTO: 0.54 K/UL — SIGNIFICANT CHANGE UP (ref 0.1–0.6)
MONOCYTES NFR BLD AUTO: 9.4 % — HIGH (ref 1.7–9.3)
MONOCYTES NFR BLD AUTO: 9.4 % — HIGH (ref 1.7–9.3)
NEUTROPHILS # BLD AUTO: 3.08 K/UL — SIGNIFICANT CHANGE UP (ref 1.4–6.5)
NEUTROPHILS # BLD AUTO: 3.08 K/UL — SIGNIFICANT CHANGE UP (ref 1.4–6.5)
NEUTROPHILS NFR BLD AUTO: 53.8 % — SIGNIFICANT CHANGE UP (ref 42.2–75.2)
NEUTROPHILS NFR BLD AUTO: 53.8 % — SIGNIFICANT CHANGE UP (ref 42.2–75.2)
NRBC # BLD: 0 /100 WBCS — SIGNIFICANT CHANGE UP (ref 0–0)
NRBC # BLD: 0 /100 WBCS — SIGNIFICANT CHANGE UP (ref 0–0)
PLATELET # BLD AUTO: 131 K/UL — SIGNIFICANT CHANGE UP (ref 130–400)
PLATELET # BLD AUTO: 131 K/UL — SIGNIFICANT CHANGE UP (ref 130–400)
PMV BLD: 11.6 FL — HIGH (ref 7.4–10.4)
PMV BLD: 11.6 FL — HIGH (ref 7.4–10.4)
POTASSIUM SERPL-MCNC: 4.1 MMOL/L — SIGNIFICANT CHANGE UP (ref 3.5–5)
POTASSIUM SERPL-SCNC: 4.1 MMOL/L — SIGNIFICANT CHANGE UP (ref 3.5–5)
RBC # BLD: 3.89 M/UL — LOW (ref 4.7–6.1)
RBC # BLD: 3.89 M/UL — LOW (ref 4.7–6.1)
RBC # FLD: 13 % — SIGNIFICANT CHANGE UP (ref 11.5–14.5)
RBC # FLD: 13 % — SIGNIFICANT CHANGE UP (ref 11.5–14.5)
SODIUM SERPL-SCNC: 137 MMOL/L — SIGNIFICANT CHANGE UP (ref 135–146)
SODIUM SERPL-SCNC: 137 MMOL/L — SIGNIFICANT CHANGE UP (ref 135–146)
SODIUM SERPL-SCNC: 140 MMOL/L — SIGNIFICANT CHANGE UP (ref 135–146)
SODIUM SERPL-SCNC: 140 MMOL/L — SIGNIFICANT CHANGE UP (ref 135–146)
WBC # BLD: 5.73 K/UL — SIGNIFICANT CHANGE UP (ref 4.8–10.8)
WBC # BLD: 5.73 K/UL — SIGNIFICANT CHANGE UP (ref 4.8–10.8)
WBC # FLD AUTO: 5.73 K/UL — SIGNIFICANT CHANGE UP (ref 4.8–10.8)
WBC # FLD AUTO: 5.73 K/UL — SIGNIFICANT CHANGE UP (ref 4.8–10.8)

## 2023-12-17 PROCEDURE — 99223 1ST HOSP IP/OBS HIGH 75: CPT

## 2023-12-17 RX ORDER — METOPROLOL TARTRATE 50 MG
25 TABLET ORAL EVERY 6 HOURS
Refills: 0 | Status: DISCONTINUED | OUTPATIENT
Start: 2023-12-17 | End: 2023-12-19

## 2023-12-17 RX ORDER — AMIODARONE HYDROCHLORIDE 400 MG/1
200 TABLET ORAL
Refills: 0 | Status: DISCONTINUED | OUTPATIENT
Start: 2023-12-17 | End: 2023-12-19

## 2023-12-17 RX ADMIN — Medication 1: at 08:11

## 2023-12-17 RX ADMIN — Medication 2: at 13:03

## 2023-12-17 RX ADMIN — PANTOPRAZOLE SODIUM 40 MILLIGRAM(S): 20 TABLET, DELAYED RELEASE ORAL at 05:33

## 2023-12-17 RX ADMIN — AMIODARONE HYDROCHLORIDE 200 MILLIGRAM(S): 400 TABLET ORAL at 14:36

## 2023-12-17 RX ADMIN — TAMSULOSIN HYDROCHLORIDE 0.4 MILLIGRAM(S): 0.4 CAPSULE ORAL at 05:33

## 2023-12-17 RX ADMIN — APIXABAN 5 MILLIGRAM(S): 2.5 TABLET, FILM COATED ORAL at 08:10

## 2023-12-17 RX ADMIN — TAMSULOSIN HYDROCHLORIDE 0.4 MILLIGRAM(S): 0.4 CAPSULE ORAL at 17:31

## 2023-12-17 RX ADMIN — Medication 25 MILLIGRAM(S): at 18:55

## 2023-12-17 RX ADMIN — ATORVASTATIN CALCIUM 40 MILLIGRAM(S): 80 TABLET, FILM COATED ORAL at 23:05

## 2023-12-17 RX ADMIN — Medication 2: at 17:30

## 2023-12-17 RX ADMIN — Medication 25 MILLIGRAM(S): at 15:43

## 2023-12-17 RX ADMIN — APIXABAN 5 MILLIGRAM(S): 2.5 TABLET, FILM COATED ORAL at 23:05

## 2023-12-17 NOTE — PROGRESS NOTE ADULT - ASSESSMENT
Assessment:  83 year old male with PMHx of HTN, HLD, DM, Paroxysmal AFib (refused to take anticoagulation), SVT, BPH, GERD, and prostate CA in remission who presented to the ED with acute onset of dizziness, lightheaded, associated with shortness of breath for 2 days. Pt found to be in Afib with RVR. Pt admitted to cardiac telemetry for further work-up and management.      Problems discussed and associated plan:    #Afib w/ RVR  CHADSVASC score of 4  - Increased home metoprolol tartrate to 25 mg q6 (from BID), Hold if SBP < 90, HR < 60   - Educated pt the risk/benefits of OAC, will further discuss with patient, pt agrees to take Eliquis , started on Eliquis 5 mg bid   - s/p IV Diltiazem d/c'd as BP became soft   - cont on amiodarone drip   - NPO after midnight for  GANGA/DCCV   - Trop 0.20->0.18-->0.20 ( likely 2/2 to demand ischemia i/s/o AF with RVR)   - Lexiscan 12/14 negative for ischemia   - f/u HBA1C, TSH, FT4- P  - BNP 4362   - AST on adm 52, likely hepatic congestion in the setting of afib w RVR, repeat LFTs tomorrow   - EP c/s called (pt known to Dr. Joaquin)-->for opinion on initiation about Sotalol s/p GANGA/CV  - pt somewhat agreeable to ablation, but not sure   - consider TTE if warranted       #HTN  - hold off on enalapril for now, to give room for BP     #HLD  - home med: simvastatin  - c/w atorvastatin 40 mg qd   - LDL 56 , Tri 259    #BPH  - continue on flomax 0.4 mg BID (home med)     #GERD  - home med: omeprazole   - continue on protonix     #DM  - f/u HBA1C in AM- Pending   - monitor FS AC/HS  - started insulin sliding scale     FULL CODE  PT: IAT  Diet: consistent carb w/ snacks       Please contact me with any questions or concerns at [phone].                 Assessment:  83 year old male with PMHx of HTN, HLD, DM, Paroxysmal AFib (refused to take anticoagulation), SVT, BPH, GERD, and prostate CA in remission who presented to the ED with acute onset of dizziness, lightheaded, associated with shortness of breath for 2 days. Pt found to be in Afib with RVR. Pt admitted to cardiac telemetry for further work-up and management.      Problems discussed and associated plan:    #Afib w/ RVR  CHADSVASC score of 4  - Increased home metoprolol tartrate to 25 mg q6 (from BID), Hold if SBP < 90, HR < 60   - Educated pt the risk/benefits of OAC, will further discuss with patient, pt agrees to take Eliquis , started on Eliquis 5 mg bid   - s/p IV Diltiazem d/c'd as BP became soft   - cont on amiodarone drip   - NPO after midnight for  GANGA/DCCV   - Trop 0.20->0.18-->0.20 ( likely 2/2 to demand ischemia i/s/o AF with RVR)   - Lexiscan 12/14 negative for ischemia   - f/u HBA1C, TSH, FT4- P  - BNP 4362   - AST on adm 52, likely hepatic congestion in the setting of afib w RVR, repeat LFTs tomorrow   - EP c/s called (pt known to Dr. Joaquin)-->for opinion on initiation about Sotalol s/p GANGA/CV  - pt somewhat agreeable to ablation, but not sure   - consider TTE if warranted       #HTN  - hold off on enalapril for now, to give room for BP     #HLD  - home med: simvastatin  - c/w atorvastatin 40 mg qd   - LDL 56 , Tri 259    #BPH  - continue on flomax 0.4 mg BID (home med)     #GERD  - home med: omeprazole   - continue on protonix     #DM  - f/u HBA1C in AM- Pending   - monitor FS AC/HS  - started insulin sliding scale     FULL CODE  PT: IAT  Diet: consistent carb w/ snacks       Please contact me with any questions or concerns at [phone].                 Assessment:  83 year old male with PMHx of HTN, HLD, DM, Paroxysmal AFib (refused to take anticoagulation), SVT, BPH, GERD, and prostate CA in remission who presented to the ED with acute onset of dizziness, lightheaded, associated with shortness of breath for 2 days. Pt found to be in Afib with RVR. Pt admitted to cardiac telemetry for further work-up and management.      Problems discussed and associated plan:    #Afib w/ RVR  CHADSVASC score of 4  On admission; BNP 4362 . Trop 0.20->0.18-->0.20 ( likely 2/2 to demand ischemia i/s/o AF with RVR). - Lexiscan 12/14 negative for ischemia   - 12/13th AST 52--> 12/16th AST 18, ALT 19wnl. - TSH 1.45   - cont on increased dose of metoprolol tartrate 25 mg q6 (from BID home dose), Hold if SBP < 90, HR < 60   - pt was recently discahrged home on Multaq but patient did not take bcuz his pharmacist informed him of interaction with simvastatin.  - Cont on Eliquis 5 mg bid   - s/p IV Diltiazem d/c'd d/t hypotension  - cont on amiodarone drip then switch to amiodarone 200mg po BID this afternoon   - NPO after midnight for  GANGA/DCCV   - previously follows with Dr Joaquin, electrophysiologists back in July Will consider consult for outpatient f/u   - TTE once in NSR       #HTN  - hold off on enalapril for now, to give room for BP     #HLD  - home med: simvastatin  - c/w atorvastatin 40 mg qd   - LDL 56 , Tri 259    #BPH  - continue on flomax 0.4 mg BID (home med)     #GERD  - home med: omeprazole   - continue on protonix     #DM  Hgb A1C 7.7  - monitor FS AC/HS  - Cont on ISS     FULL CODE  PT: IAT  Diet: consistent carb w/ snacks       Please contact me with any questions or concerns at [phone].                 Assessment:  83 year old male with PMHx of HTN, HLD, DM, Paroxysmal AFib (refused to take anticoagulation), SVT, BPH, GERD, and prostate CA in remission who presented to the ED with acute onset of dizziness, lightheaded, associated with shortness of breath for 2 days. Pt found to be in Afib with RVR. Pt admitted to cardiac telemetry for further work-up and management.      Problems discussed and associated plan:    #Afib w/ RVR  CHADSVASC score of 4  On admission; BNP 4362 . Trop 0.20->0.18-->0.20 ( likely 2/2 to demand ischemia i/s/o AF with RVR). - Lexiscan 12/14 negative for ischemia   - 12/13th AST 52--> 12/16th AST 18, ALT 19wnl. - TSH 1.45   - cont on increased dose of metoprolol tartrate 25 mg q6 (from BID home dose), Hold if SBP < 90, HR < 60   - pt was recently discahrged home on Multaq but patient did not take bcuz his pharmacist informed him of interaction with simvastatin.  - Cont on Eliquis 5 mg bid   - s/p IV Diltiazem d/c'd d/t hypotension  - cont on amiodarone drip then switch to amiodarone 200mg po BID this afternoon   - NPO after midnight for  GANGA/DCCV   - previously follows with Dr Joaquin, electrophysiologists back in July Will consider consult for outpatient f/u   - TTE once in NSR       #HTN  - hold off on enalapril for now, to give room for BP     #HLD  - home med: simvastatin  - c/w atorvastatin 40 mg qd   - LDL 56 , Tri 259    #BPH  - continue on flomax 0.4 mg BID (home med)     #GERD  - home med: omeprazole   - continue on protonix     #DM  Hgb A1C 7.7  - monitor FS AC/HS  - Cont on ISS     FULL CODE  PT: IAT  Diet: consistent carb w/ snacks       Please contact me with any questions or concerns at [phone].

## 2023-12-17 NOTE — PROGRESS NOTE ADULT - SUBJECTIVE AND OBJECTIVE BOX
Chief complaint: Patient is a 83y old  Male who presents with a chief complaint of Afib with RVR (14 Dec 2023 12:39)    Interval history: pt seen and examined at bedside, denies palpitations/SOB/chest pain/dizziness currently  HR on tele 130's aflutter   cont on amiodarone drip 0.5MG/Min    Review of systems: A complete 10-point review of systems was obtained and is negative except as stated in the interval history.    Vitals:  T(F): 98.1, Max: 98.2 (12-14 @ 02:00)  HR: 128 (63 - 136)  BP: 114/81 (87/58 - 127/85)  RR: 21 (12 - 21)  SpO2: 96% (94% - 99%)    Ins & outs:     12-13 @ 07:01  -  12-14 @ 07:00  --------------------------------------------------------  IN: 545 mL / OUT: 1550 mL / NET: -1005 mL    12-14 @ 07:01  -  12-14 @ 13:10  --------------------------------------------------------  IN: 0 mL / OUT: 275 mL / NET: -275 mL      Weight trend:  Weight (kg): 92.2 (12-13)    Physical exam:  General: No apparent distress  HEENT: Anicteric sclera. Moist mucous membranes. JVP negative    Cardiac: irregular rate and rhythm. No murmurs, rubs, or gallops.   Vascular: Symmetric radial pulses. Dorsalis pedis pulses palpable.   Respiratory: Normal effort. Clear to ascultation.   Abdomen: Soft, nontender. Audible bowel sounds.   Extremities: Warm with no edema. No cyanosis or clubbing.   Skin: Warm and dry. No rash.   Neurologic: Grossly normal motor function.   Psychiatric: Oriented to person, place, and time.     Data reviewed:  - Telemetry: HR on tele 130's aflutter   - ECG < from: 12 Lead ECG (12.14.23 @ 06:39) >  Diagnosis Line Atrial flutter with variable A-V block  Right bundle branch block  Abnormal ECG    < end of copied text >    - TTE < from: TTE Echo Complete w/o Contrast w/ Doppler (06.30.23 @ 14:35) >    Summary:   1. LV Ejection Fraction by Nunes's Method with a biplane EF of 58 %.   2. Normal global left ventricular systolic function.   3. Normal left atrial size.   4. Normal right atrial size.    < end of copied text >    - Chest x-ray < from: Xray Chest 1 View- PORTABLE-Urgent (12.13.23 @ 09:22) >    Impression:    No radiographic evidence of acute cardiopulmonary disease.    < end of copied text >  < from: CT Angio Chest PE Protocol w/ IV Cont (12.13.23 @ 11:09) >  IMPRESSION:    No evidence of pulmonary embolism.    < end of copied text >    - Stress test: < from: NM Nuclear Stress Pharmacologic Multiple (07.01.23 @ 14:16) >  Impression:  1. NORMAL LEXISCAN / REST MYOCARDIAL PERFUSION TOMOGRAPHY, WITH NO   EVIDENCE FOR ISCHEMIA DURING LEXISCAN INFUSION.  2. NORMAL RESTING LEFT VENTRICULAR WALL MOTION AND WALL THICKENING.  3. LEFT VENTRICULAR EJECTION FRACTION OF  58 % WHICH IS WITHIN RANGE OF   NORMAL.    < end of copied text >            - Labs:                        12.5   6.39  )-----------( 133      ( 14 Dec 2023 05:39 )             36.0     12-14    136  |  106  |  27<H>  ----------------------------<  202<H>  4.2   |  19  |  0.8    Ca    8.4      14 Dec 2023 05:39  Mg     1.8     12-14    TPro  6.4  /  Alb  3.6  /  TBili  0.7  /  DBili  x   /  AST  52<H>  /  ALT  24  /  AlkPhos  73  12-13    PT/INR - ( 13 Dec 2023 09:20 )   PT: 11.30 sec;   INR: 0.99 ratio         PTT - ( 13 Dec 2023 09:20 )  PTT:29.6 sec  Troponin T, Serum: 0.20 ng/mL (12-14-23 @ 05:39)  Troponin T, Serum: 0.18 ng/mL (12-13-23 @ 22:40)  Troponin T, Serum: 0.20 ng/mL (12-13-23 @ 09:20)        Triglycerides, Serum: 259 mg/dL (12-13-23 @ 22:40)  LDL Cholesterol Calculated: 56 mg/dL (12-13-23 @ 22:40)        Medications:  apixaban 5 milliGRAM(s) Oral every 12 hours  atorvastatin 40 milliGRAM(s) Oral at bedtime  dextrose 50% Injectable 12.5 Gram(s) IV Push once  dextrose 50% Injectable 25 Gram(s) IV Push once  dextrose 50% Injectable 25 Gram(s) IV Push once  glucagon  Injectable 1 milliGRAM(s) IntraMuscular once  insulin lispro (ADMELOG) corrective regimen sliding scale   SubCutaneous at bedtime  insulin lispro (ADMELOG) corrective regimen sliding scale   SubCutaneous three times a day before meals  metoprolol tartrate 25 milliGRAM(s) Oral every 6 hours  pantoprazole    Tablet 40 milliGRAM(s) Oral before breakfast  tamsulosin 0.4 milliGRAM(s) Oral two times a day    Drips:  dextrose 5%. 1000 milliLiter(s) (50 mL/Hr) IV Continuous <Continuous>  dextrose 5%. 1000 milliLiter(s) (100 mL/Hr) IV Continuous <Continuous>    PRN:     Allergies    No Known Allergies    Intolerances       Chief complaint: Patient is a 83y old  Male who presents with a chief complaint of Afib with RVR (14 Dec 2023 12:39)    Interval history: pt seen and examined at bedside, denies palpitations/SOB/chest pain/dizziness currently  HR on tele 130's aflutter   cont on amiodarone drip 0.5MG/Min    Review of systems: A complete 10-point review of systems was obtained and is negative except as stated in the interval history.    Vitals:    T(C): 36.3 (17 Dec 2023 07:37), Max: 36.8 (16 Dec 2023 17:24)  T(F): 97.4 (17 Dec 2023 07:37), Max: 98.3 (16 Dec 2023 20:00)  HR: 132 (17 Dec 2023 09:30) (121 - 134)  BP: 114/78 (17 Dec 2023 09:30) (87/51 - 127/80)  BP(mean): 92 (17 Dec 2023 07:37) (64 - 93)    RR: 19 (17 Dec 2023 09:30) (13 - 20)  SpO2: 95% (17 Dec 2023 09:30) (94% - 99%)    O2 Parameters below as of 17 Dec 2023 09:30  Patient On (Oxygen Delivery Method): room air        Ins & outs:     12-13 @ 07:01  -  12-14 @ 07:00  --------------------------------------------------------  IN: 545 mL / OUT: 1550 mL / NET: -1005 mL    12-14 @ 07:01  -  12-14 @ 13:10  --------------------------------------------------------  IN: 0 mL / OUT: 275 mL / NET: -275 mL      Weight trend:  Weight (kg): 92.2 (12-13)    Physical exam:  General: No apparent distress  HEENT: Anicteric sclera. Moist mucous membranes. JVP negative    Cardiac: irregular rate and rhythm. No murmurs, rubs, or gallops.   Vascular: Symmetric radial pulses. Dorsalis pedis pulses palpable.   Respiratory: Normal effort. Clear to ascultation.   Abdomen: Soft, nontender. Audible bowel sounds.   Extremities: Warm with no edema. No cyanosis or clubbing.   Skin: Warm and dry. No rash.   Neurologic: Grossly normal motor function.   Psychiatric: Oriented to person, place, and time.     Data reviewed:  - Telemetry: HR on tele 130's aflutter   - ECG < from: 12 Lead ECG (12.14.23 @ 06:39) >  Diagnosis Line Atrial flutter with variable A-V block  Right bundle branch block  Abnormal ECG    < end of copied text >    - TTE < from: TTE Echo Complete w/o Contrast w/ Doppler (06.30.23 @ 14:35) >    Summary:   1. LV Ejection Fraction by Nunes's Method with a biplane EF of 58 %.   2. Normal global left ventricular systolic function.   3. Normal left atrial size.   4. Normal right atrial size.    < end of copied text >    - Chest x-ray < from: Xray Chest 1 View- PORTABLE-Urgent (12.13.23 @ 09:22) >    Impression:    No radiographic evidence of acute cardiopulmonary disease.    < end of copied text >  < from: CT Angio Chest PE Protocol w/ IV Cont (12.13.23 @ 11:09) >  IMPRESSION:    No evidence of pulmonary embolism.    < end of copied text >    - Stress test: < from: NM Nuclear Stress Pharmacologic Multiple (07.01.23 @ 14:16) >  Impression:  1. NORMAL LEXISCAN / REST MYOCARDIAL PERFUSION TOMOGRAPHY, WITH NO   EVIDENCE FOR ISCHEMIA DURING LEXISCAN INFUSION.  2. NORMAL RESTING LEFT VENTRICULAR WALL MOTION AND WALL THICKENING.  3. LEFT VENTRICULAR EJECTION FRACTION OF  58 % WHICH IS WITHIN RANGE OF   NORMAL.    < end of copied text >      - Labs:                        12.0   5.73  )-----------( 131      ( 17 Dec 2023 05:36 )             35.1     12-17    140  |  109  |  14  ----------------------------<  163<H>  4.1   |  21  |  0.9    Ca    8.4      17 Dec 2023 05:36  Mg     2.0     12-17    TPro  5.3<L>  /  Alb  3.2<L>  /  TBili  0.4  /  DBili  <0.2  /  AST  18  /  ALT  19  /  AlkPhos  82  12-16    LIVER FUNCTIONS - ( 16 Dec 2023 20:57 )  Alb: 3.2 g/dL / Pro: 5.3 g/dL / ALK PHOS: 82 U/L / ALT: 19 U/L / AST: 18 U/L / GGT: x             Lactate Trend    Urinalysis Basic - ( 17 Dec 2023 05:36 )    Color: x / Appearance: x / SG: x / pH: x  Gluc: 163 mg/dL / Ketone: x  / Bili: x / Urobili: x   Blood: x / Protein: x / Nitrite: x   Leuk Esterase: x / RBC: x / WBC x   Sq Epi: x / Non Sq Epi: x / Bacteria: x      Triglycerides, Serum: 259 mg/dL (12-13-23 @ 22:40)  LDL Cholesterol Calculated: 56 mg/dL (12-13-23 @ 22:40)      Medications:  apixaban 5 milliGRAM(s) Oral every 12 hours  atorvastatin 40 milliGRAM(s) Oral at bedtime  dextrose 50% Injectable 12.5 Gram(s) IV Push once  dextrose 50% Injectable 25 Gram(s) IV Push once  dextrose 50% Injectable 25 Gram(s) IV Push once  glucagon  Injectable 1 milliGRAM(s) IntraMuscular once  insulin lispro (ADMELOG) corrective regimen sliding scale   SubCutaneous at bedtime  insulin lispro (ADMELOG) corrective regimen sliding scale   SubCutaneous three times a day before meals  metoprolol tartrate 25 milliGRAM(s) Oral every 6 hours  pantoprazole    Tablet 40 milliGRAM(s) Oral before breakfast  tamsulosin 0.4 milliGRAM(s) Oral two times a day    Drips:  dextrose 5%. 1000 milliLiter(s) (50 mL/Hr) IV Continuous <Continuous>  dextrose 5%. 1000 milliLiter(s) (100 mL/Hr) IV Continuous <Continuous>    PRN:     Allergies    No Known Allergies    Intolerances

## 2023-12-18 LAB
ANION GAP SERPL CALC-SCNC: 11 MMOL/L — SIGNIFICANT CHANGE UP (ref 7–14)
ANION GAP SERPL CALC-SCNC: 11 MMOL/L — SIGNIFICANT CHANGE UP (ref 7–14)
APTT BLD: 30.8 SEC — SIGNIFICANT CHANGE UP (ref 27–39.2)
APTT BLD: 30.8 SEC — SIGNIFICANT CHANGE UP (ref 27–39.2)
BUN SERPL-MCNC: 16 MG/DL — SIGNIFICANT CHANGE UP (ref 10–20)
BUN SERPL-MCNC: 16 MG/DL — SIGNIFICANT CHANGE UP (ref 10–20)
CALCIUM SERPL-MCNC: 8.6 MG/DL — SIGNIFICANT CHANGE UP (ref 8.4–10.5)
CALCIUM SERPL-MCNC: 8.6 MG/DL — SIGNIFICANT CHANGE UP (ref 8.4–10.5)
CHLORIDE SERPL-SCNC: 107 MMOL/L — SIGNIFICANT CHANGE UP (ref 98–110)
CHLORIDE SERPL-SCNC: 107 MMOL/L — SIGNIFICANT CHANGE UP (ref 98–110)
CO2 SERPL-SCNC: 19 MMOL/L — SIGNIFICANT CHANGE UP (ref 17–32)
CO2 SERPL-SCNC: 19 MMOL/L — SIGNIFICANT CHANGE UP (ref 17–32)
CREAT SERPL-MCNC: 1 MG/DL — SIGNIFICANT CHANGE UP (ref 0.7–1.5)
CREAT SERPL-MCNC: 1 MG/DL — SIGNIFICANT CHANGE UP (ref 0.7–1.5)
EGFR: 75 ML/MIN/1.73M2 — SIGNIFICANT CHANGE UP
EGFR: 75 ML/MIN/1.73M2 — SIGNIFICANT CHANGE UP
GLUCOSE BLDC GLUCOMTR-MCNC: 166 MG/DL — HIGH (ref 70–99)
GLUCOSE BLDC GLUCOMTR-MCNC: 166 MG/DL — HIGH (ref 70–99)
GLUCOSE BLDC GLUCOMTR-MCNC: 210 MG/DL — HIGH (ref 70–99)
GLUCOSE BLDC GLUCOMTR-MCNC: 210 MG/DL — HIGH (ref 70–99)
GLUCOSE BLDC GLUCOMTR-MCNC: 212 MG/DL — HIGH (ref 70–99)
GLUCOSE BLDC GLUCOMTR-MCNC: 212 MG/DL — HIGH (ref 70–99)
GLUCOSE BLDC GLUCOMTR-MCNC: 214 MG/DL — HIGH (ref 70–99)
GLUCOSE BLDC GLUCOMTR-MCNC: 214 MG/DL — HIGH (ref 70–99)
GLUCOSE SERPL-MCNC: 220 MG/DL — HIGH (ref 70–99)
GLUCOSE SERPL-MCNC: 220 MG/DL — HIGH (ref 70–99)
HCT VFR BLD CALC: 36.8 % — LOW (ref 42–52)
HCT VFR BLD CALC: 36.8 % — LOW (ref 42–52)
HGB BLD-MCNC: 13 G/DL — LOW (ref 14–18)
HGB BLD-MCNC: 13 G/DL — LOW (ref 14–18)
INR BLD: 1.27 RATIO — SIGNIFICANT CHANGE UP (ref 0.65–1.3)
INR BLD: 1.27 RATIO — SIGNIFICANT CHANGE UP (ref 0.65–1.3)
MAGNESIUM SERPL-MCNC: 1.9 MG/DL — SIGNIFICANT CHANGE UP (ref 1.8–2.4)
MAGNESIUM SERPL-MCNC: 1.9 MG/DL — SIGNIFICANT CHANGE UP (ref 1.8–2.4)
MCHC RBC-ENTMCNC: 31.6 PG — HIGH (ref 27–31)
MCHC RBC-ENTMCNC: 31.6 PG — HIGH (ref 27–31)
MCHC RBC-ENTMCNC: 35.3 G/DL — SIGNIFICANT CHANGE UP (ref 32–37)
MCHC RBC-ENTMCNC: 35.3 G/DL — SIGNIFICANT CHANGE UP (ref 32–37)
MCV RBC AUTO: 89.3 FL — SIGNIFICANT CHANGE UP (ref 80–94)
MCV RBC AUTO: 89.3 FL — SIGNIFICANT CHANGE UP (ref 80–94)
NRBC # BLD: 0 /100 WBCS — SIGNIFICANT CHANGE UP (ref 0–0)
NRBC # BLD: 0 /100 WBCS — SIGNIFICANT CHANGE UP (ref 0–0)
PLATELET # BLD AUTO: 140 K/UL — SIGNIFICANT CHANGE UP (ref 130–400)
PLATELET # BLD AUTO: 140 K/UL — SIGNIFICANT CHANGE UP (ref 130–400)
PMV BLD: 11.9 FL — HIGH (ref 7.4–10.4)
PMV BLD: 11.9 FL — HIGH (ref 7.4–10.4)
POTASSIUM SERPL-MCNC: 4.4 MMOL/L — SIGNIFICANT CHANGE UP (ref 3.5–5)
POTASSIUM SERPL-MCNC: 4.4 MMOL/L — SIGNIFICANT CHANGE UP (ref 3.5–5)
POTASSIUM SERPL-SCNC: 4.4 MMOL/L — SIGNIFICANT CHANGE UP (ref 3.5–5)
POTASSIUM SERPL-SCNC: 4.4 MMOL/L — SIGNIFICANT CHANGE UP (ref 3.5–5)
PROTHROM AB SERPL-ACNC: 14.5 SEC — HIGH (ref 9.95–12.87)
PROTHROM AB SERPL-ACNC: 14.5 SEC — HIGH (ref 9.95–12.87)
RBC # BLD: 4.12 M/UL — LOW (ref 4.7–6.1)
RBC # BLD: 4.12 M/UL — LOW (ref 4.7–6.1)
RBC # FLD: 13 % — SIGNIFICANT CHANGE UP (ref 11.5–14.5)
RBC # FLD: 13 % — SIGNIFICANT CHANGE UP (ref 11.5–14.5)
SODIUM SERPL-SCNC: 137 MMOL/L — SIGNIFICANT CHANGE UP (ref 135–146)
SODIUM SERPL-SCNC: 137 MMOL/L — SIGNIFICANT CHANGE UP (ref 135–146)
WBC # BLD: 6.22 K/UL — SIGNIFICANT CHANGE UP (ref 4.8–10.8)
WBC # BLD: 6.22 K/UL — SIGNIFICANT CHANGE UP (ref 4.8–10.8)
WBC # FLD AUTO: 6.22 K/UL — SIGNIFICANT CHANGE UP (ref 4.8–10.8)
WBC # FLD AUTO: 6.22 K/UL — SIGNIFICANT CHANGE UP (ref 4.8–10.8)

## 2023-12-18 PROCEDURE — 92960 CARDIOVERSION ELECTRIC EXT: CPT | Mod: XU

## 2023-12-18 PROCEDURE — 99233 SBSQ HOSP IP/OBS HIGH 50: CPT

## 2023-12-18 PROCEDURE — 99358 PROLONG SERVICE W/O CONTACT: CPT | Mod: NC

## 2023-12-18 RX ADMIN — Medication 25 MILLIGRAM(S): at 06:54

## 2023-12-18 RX ADMIN — Medication 2: at 17:01

## 2023-12-18 RX ADMIN — APIXABAN 5 MILLIGRAM(S): 2.5 TABLET, FILM COATED ORAL at 20:34

## 2023-12-18 RX ADMIN — Medication 25 MILLIGRAM(S): at 17:03

## 2023-12-18 RX ADMIN — PANTOPRAZOLE SODIUM 40 MILLIGRAM(S): 20 TABLET, DELAYED RELEASE ORAL at 06:54

## 2023-12-18 RX ADMIN — AMIODARONE HYDROCHLORIDE 200 MILLIGRAM(S): 400 TABLET ORAL at 22:37

## 2023-12-18 RX ADMIN — APIXABAN 5 MILLIGRAM(S): 2.5 TABLET, FILM COATED ORAL at 08:22

## 2023-12-18 RX ADMIN — ATORVASTATIN CALCIUM 40 MILLIGRAM(S): 80 TABLET, FILM COATED ORAL at 22:37

## 2023-12-18 RX ADMIN — TAMSULOSIN HYDROCHLORIDE 0.4 MILLIGRAM(S): 0.4 CAPSULE ORAL at 06:53

## 2023-12-18 RX ADMIN — AMIODARONE HYDROCHLORIDE 200 MILLIGRAM(S): 400 TABLET ORAL at 11:11

## 2023-12-18 RX ADMIN — Medication 25 MILLIGRAM(S): at 11:11

## 2023-12-18 RX ADMIN — AMIODARONE HYDROCHLORIDE 200 MILLIGRAM(S): 400 TABLET ORAL at 00:04

## 2023-12-18 RX ADMIN — Medication 25 MILLIGRAM(S): at 00:04

## 2023-12-18 RX ADMIN — TAMSULOSIN HYDROCHLORIDE 0.4 MILLIGRAM(S): 0.4 CAPSULE ORAL at 17:03

## 2023-12-18 NOTE — PROGRESS NOTE ADULT - ASSESSMENT
Assessment:  83 year old male with PMHx of HTN, HLD, DM, Paroxysmal AFib (refused to take anticoagulation), SVT, BPH, GERD, and prostate CA in remission who presented to the ED with acute onset of dizziness, lightheaded, associated with shortness of breath for 2 days. Pt found to be in Afib with RVR. Pt admitted to cardiac telemetry for further work-up and management.      Problems discussed and associated plan:    #Afib w/ RVR  CHADSVASC score of 4  On admission; BNP 4362 . Trop 0.20->0.18-->0.20 ( likely 2/2 to demand ischemia i/s/o AF with RVR). - Lexiscan 12/14 negative for ischemia   - 12/13th AST 52--> 12/16th AST 18, ALT 19wnl. - TSH 1.45   - cont on increased dose of metoprolol tartrate 25 mg q6 (from BID home dose), Hold if SBP < 90, HR < 60   - pt was recently discharged home on Multaq but patient did not take bcuz his pharmacist informed him of interaction with simvastatin.  - Cont on Eliquis 5 mg bid   - s/p IV Diltiazem d/c'd d/t hypotension  -12/17th completed amiodarone drip then switch to amiodarone 200mg po BID   - keep NPO for  GANGA/DCCV   - previously follows with Dr Joaquin, electrophysiologists back in July Will consider consult for outpatient f/u   - TTE once in NSR       #HTN  - hold off on enalapril for now, to give room for BP     #HLD  - home med: simvastatin  - c/w atorvastatin 40 mg qd   - LDL 56 , Tri 259    #BPH  - continue on flomax 0.4 mg BID (home med)     #GERD  - home med: omeprazole   - continue on protonix     #DM  Hgb A1C 7.7  - monitor FS AC/HS  - Cont on ISS     FULL CODE  PT: IAT  Diet: consistent carb w/ snacks       Please contact me with any questions or concerns at [phone].                 Assessment:  83 year old male with PMHx of HTN, HLD, DM, Paroxysmal AFib (refused to take anticoagulation), SVT, BPH, GERD, and prostate CA in remission who presented to the ED with acute onset of dizziness, lightheaded, associated with shortness of breath for 2 days. Pt found to be in Afib with RVR. Pt admitted to cardiac telemetry for further work-up and management.      Problems discussed and associated plan:    #Afib w/ RVR  CHADSVASC score of 4  On admission; BNP 4362 . Trop 0.20->0.18-->0.20 ( likely 2/2 to demand ischemia i/s/o AF with RVR). - Lexiscan 12/14 negative for ischemia   - 12/13th AST 52--> 12/16th AST 18, ALT 19wnl. - TSH 1.45   - cont on increased dose of metoprolol tartrate 25 mg q6 (from BID home dose), Hold if SBP < 90, HR < 60   - pt was recently discharged home on Multaq but patient did not take bcuz his pharmacist informed him of interaction with simvastatin.  - Cont on Eliquis 5 mg bid   - s/p IV Diltiazem d/c'd d/t hypotension  -12/17th completed amiodarone drip then switch to amiodarone 200mg po BID   - keep NPO for  GANGA/DCCV   - previously follows with Dr Joaquin, electrophysiologists back in July Will consider consult for outpatient f/u   - TTE once in NSR       #HTN  - hold off on enalapril for now, to give room for BP     #HLD  - home med: simvastatin  - c/w atorvastatin 40 mg qd   - LDL 56 , Tri 259    #BPH  - continue on flomax 0.4 mg BID (home med)     #GERD  - home med: omeprazole   - continue on protonix     #DM  Hgb A1C 7.7  - monitor FS AC/HS  - Cont on ISS     FULL CODE  PT: IAT  Diet: consistent carb w/ snacks       Please contact me with any questions or concerns at [phone].

## 2023-12-18 NOTE — CONSULT NOTE ADULT - SUBJECTIVE AND OBJECTIVE BOX
Patient is a 83y old  Male who presents with a chief complaint of atrial flutter with RVR (18 Dec 2023 08:50)    HPI:  83 year old male patient of Adrianne Fabian with PMHx of HTN, HLD, DM, Paroxysmal AFib initially diagnosed in 1994, SVT,  BPH, GERD, and prostate CA, s/p recent admission had Aflutter cardioverted on 12/14 (200J) and discharged on 12/15.  Pt presented with SOB, palpitations, dizziness. Patient was seen by EP last admission and was discharged on Eliquis , metoprolol 25 q12 and Multaq 400 mg bid, out of which patient took Eliquis this AM, and took last dose of metoprolol yesterday evening, however did not take Multaq yet due to potential interaction with Simvastatin. Patient denies chest pain, cough, URI symptoms, abdominal pain, n/v.     Patient also was admitted at end of June for similar symptoms, found to be in SVT,  was given Adenosine, and was  Cardioverted with 100J by EMS. After his hospitalization, he was discharged with an MCOT and found to have two episodes of SVT. Since then no other arrhythmia. ILR was offered in the outpatient setting (EP- ) , and was declined.     PAST MEDICAL & SURGICAL HISTORY:  Diabetes mellitus      Hypertension      Paroxysmal atrial fibrillation      Prostate cancer      Status post cervical spinal fusion      PREVIOUS DIAGNOSTIC TESTING:      ECHO  FINDINGS:  < from: TTE Echo w/ Contrast Follow Up Limited (12.15.23 @ 10:34) >  Summary:   1. Left ventricular ejection fraction, by visual estimation, is 45 to   50%.   2. Mildly decreased global left ventricular systolic function.   3. Spectral Doppler shows impaired relaxation pattern of left   ventricular myocardial filling (Grade I diastolic dysfunction).   4. Normal left atrial size.   5. Normal right atrial size.   6. Mild mitral valve regurgitation.   7. Sclerotic aortic valve with normal opening.   8. Mild aortic regurgitation.    < end of copied text >      MEDICATIONS  (STANDING):  aMIOdarone    Tablet 200 milliGRAM(s) Oral two times a day  apixaban 5 milliGRAM(s) Oral every 12 hours  atorvastatin 40 milliGRAM(s) Oral at bedtime  dextrose 5%. 1000 milliLiter(s) (50 mL/Hr) IV Continuous <Continuous>  dextrose 5%. 1000 milliLiter(s) (100 mL/Hr) IV Continuous <Continuous>  dextrose 50% Injectable 25 Gram(s) IV Push once  dextrose 50% Injectable 12.5 Gram(s) IV Push once  dextrose 50% Injectable 25 Gram(s) IV Push once  glucagon  Injectable 1 milliGRAM(s) IntraMuscular once  insulin lispro (ADMELOG) corrective regimen sliding scale   SubCutaneous at bedtime  insulin lispro (ADMELOG) corrective regimen sliding scale   SubCutaneous three times a day before meals  metoprolol tartrate 25 milliGRAM(s) Oral every 6 hours  pantoprazole    Tablet 40 milliGRAM(s) Oral before breakfast  tamsulosin 0.4 milliGRAM(s) Oral two times a day    MEDICATIONS  (PRN):  dextrose Oral Gel 15 Gram(s) Oral once PRN Blood Glucose LESS THAN 70 milliGRAM(s)/deciliter      FAMILY HISTORY:  No pertinent family history in first degree relatives    SOCIAL HISTORY:  none    CIGARETTES:  none    ALCOHOL:  none    Past Surgical History:    Allergies:    No Known Allergies    REVIEW OF SYSTEMS:  as above    Vital Signs Last 24 Hrs  T(C): 36.7 (18 Dec 2023 07:44), Max: 36.7 (17 Dec 2023 15:31)  T(F): 98 (18 Dec 2023 07:44), Max: 98.1 (17 Dec 2023 15:31)  HR: 122 (18 Dec 2023 07:44) (120 - 133)  BP: 114/87 (18 Dec 2023 07:44) (112/79 - 124/83)  BP(mean): 96 (18 Dec 2023 07:44) (90 - 96)  RR: 18 (18 Dec 2023 07:44) (13 - 20)  SpO2: 96% (18 Dec 2023 07:44) (94% - 97%)    Parameters below as of 18 Dec 2023 04:00  Patient On (Oxygen Delivery Method): room air    PHYSICAL EXAM:    GENERAL: In no apparent distress, well nourished, and hydrated.  HEART: Regular rate and rhythm; No murmurs, rubs, or gallops.  PULMONARY: Clear to auscultation and perfusion.  No rales, wheezing, or rhonchi bilaterally.  ABDOMEN: Soft, Nontender, Nondistended; Bowel sounds present  EXTREMITIES:  2+ Peripheral Pulses, No clubbing, cyanosis, or edema  NEUROLOGICAL: Grossly nonfocal    INTERPRETATION OF TELEMETRY:  Atrial Flutter    ECG:        LABS:                        13.0   6.22  )-----------( 140      ( 18 Dec 2023 05:57 )             36.8     12-18    137  |  107  |  16  ----------------------------<  220<H>  4.4   |  19  |  1.0    Ca    8.6      18 Dec 2023 05:57  Mg     1.9     12-18    TPro  5.3<L>  /  Alb  3.2<L>  /  TBili  0.4  /  DBili  <0.2  /  AST  18  /  ALT  19  /  AlkPhos  82  12-16        PT/INR - ( 18 Dec 2023 05:57 )   PT: 14.50 sec;   INR: 1.27 ratio         PTT - ( 18 Dec 2023 05:57 )  PTT:30.8 sec  Urinalysis Basic - ( 18 Dec 2023 05:57 )    Color: x / Appearance: x / SG: x / pH: x  Gluc: 220 mg/dL / Ketone: x  / Bili: x / Urobili: x   Blood: x / Protein: x / Nitrite: x   Leuk Esterase: x / RBC: x / WBC x   Sq Epi: x / Non Sq Epi: x / Bacteria: x      BNP      RADIOLOGY & ADDITIONAL STUDIES: Patient is a 83y old  Male who presents with a chief complaint of atrial flutter with RVR (18 Dec 2023 08:50)    HPI:  83 year old male patient of Adrianne Fabian with PMHx of HTN, HLD, DM, Paroxysmal AFib initially diagnosed in 1994, SVT,  BPH, GERD, and prostate CA, s/p recent admission had Aflutter cardioverted on 12/14 (200J) and discharged on 12/15.  Pt presented with SOB, palpitations, dizziness. Patient was seen by EP last admission and was discharged on Eliquis , metoprolol 25 q12 and Multaq 400 mg bid, out of which patient took Eliquis this AM, and took last dose of metoprolol yesterday evening, however did not take Multaq yet due to potential interaction with Simvastatin. Patient denies chest pain, cough, URI symptoms, abdominal pain, n/v.     Patient also was admitted at end of June for similar symptoms, found to be in SVT,  was given Adenosine, and was  Cardioverted with 100J by EMS. After his hospitalization, he was discharged with an MCOT and found to have two episodes of SVT. Since then no other arrhythmia. ILR was offered in the outpatient setting (EP- ) , and was declined.    EP: Pt was readmitted with Aflutter with RVR.  He was started on Amiodarone gtt over the weekend     PAST MEDICAL & SURGICAL HISTORY:  Diabetes mellitus    Hypertension    Paroxysmal atrial fibrillation    Prostate cancer    Status post cervical spinal fusion    PREVIOUS DIAGNOSTIC TESTING:      ECHO  FINDINGS:  < from: TTE Echo w/ Contrast Follow Up Limited (12.15.23 @ 10:34) >  Summary:   1. Left ventricular ejection fraction, by visual estimation, is 45 to   50%.   2. Mildly decreased global left ventricular systolic function.   3. Spectral Doppler shows impaired relaxation pattern of left   ventricular myocardial filling (Grade I diastolic dysfunction).   4. Normal left atrial size.   5. Normal right atrial size.   6. Mild mitral valve regurgitation.   7. Sclerotic aortic valve with normal opening.   8. Mild aortic regurgitation.    < end of copied text >      MEDICATIONS  (STANDING):  aMIOdarone    Tablet 200 milliGRAM(s) Oral two times a day  apixaban 5 milliGRAM(s) Oral every 12 hours  atorvastatin 40 milliGRAM(s) Oral at bedtime  dextrose 5%. 1000 milliLiter(s) (50 mL/Hr) IV Continuous <Continuous>  dextrose 5%. 1000 milliLiter(s) (100 mL/Hr) IV Continuous <Continuous>  dextrose 50% Injectable 25 Gram(s) IV Push once  dextrose 50% Injectable 12.5 Gram(s) IV Push once  dextrose 50% Injectable 25 Gram(s) IV Push once  glucagon  Injectable 1 milliGRAM(s) IntraMuscular once  insulin lispro (ADMELOG) corrective regimen sliding scale   SubCutaneous at bedtime  insulin lispro (ADMELOG) corrective regimen sliding scale   SubCutaneous three times a day before meals  metoprolol tartrate 25 milliGRAM(s) Oral every 6 hours  pantoprazole    Tablet 40 milliGRAM(s) Oral before breakfast  tamsulosin 0.4 milliGRAM(s) Oral two times a day    MEDICATIONS  (PRN):  dextrose Oral Gel 15 Gram(s) Oral once PRN Blood Glucose LESS THAN 70 milliGRAM(s)/deciliter    FAMILY HISTORY:  No pertinent family history in first degree relatives    SOCIAL HISTORY:  none    CIGARETTES:  none    ALCOHOL:  none    Past Surgical History:    Allergies:    No Known Allergies    REVIEW OF SYSTEMS:  as above    Vital Signs Last 24 Hrs  T(C): 36.7 (18 Dec 2023 07:44), Max: 36.7 (17 Dec 2023 15:31)  T(F): 98 (18 Dec 2023 07:44), Max: 98.1 (17 Dec 2023 15:31)  HR: 122 (18 Dec 2023 07:44) (120 - 133)  BP: 114/87 (18 Dec 2023 07:44) (112/79 - 124/83)  BP(mean): 96 (18 Dec 2023 07:44) (90 - 96)  RR: 18 (18 Dec 2023 07:44) (13 - 20)  SpO2: 96% (18 Dec 2023 07:44) (94% - 97%)    Parameters below as of 18 Dec 2023 04:00  Patient On (Oxygen Delivery Method): room air    PHYSICAL EXAM:    GENERAL: In no apparent distress, well nourished, and hydrated.  HEART: Regular rate and rhythm; No murmurs, rubs, or gallops.  PULMONARY: Clear to auscultation and perfusion.  No rales, wheezing, or rhonchi bilaterally.  ABDOMEN: Soft, Nontender, Nondistended; Bowel sounds present  EXTREMITIES:  2+ Peripheral Pulses, No clubbing, cyanosis, or edema  NEUROLOGICAL: Grossly nonfocal    INTERPRETATION OF TELEMETRY:  Atrial Flutter    ECG:  < from: 12 Lead ECG (12.16.23 @ 12:07) >    Ventricular Rate 141 BPM    Atrial Rate 322 BPM    QRS Duration 116 ms    Q-T Interval 304 ms    QTC Calculation(Bazett) 465 ms    R Axis -15 degrees    T Axis -8 degrees    Diagnosis Line Atrial flutter with variable A-V block  Right bundle branchblock  Minimal voltage criteria for LVH, may be normal variant ( R in aVL )  Abnormal ECG    LABS:                        13.0   6.22  )-----------( 140      ( 18 Dec 2023 05:57 )             36.8     12-18    137  |  107  |  16  ----------------------------<  220<H>  4.4   |  19  |  1.0    Ca    8.6      18 Dec 2023 05:57  Mg     1.9     12-18    TPro  5.3<L>  /  Alb  3.2<L>  /  TBili  0.4  /  DBili  <0.2  /  AST  18  /  ALT  19  /  AlkPhos  82  12-16        PT/INR - ( 18 Dec 2023 05:57 )   PT: 14.50 sec;   INR: 1.27 ratio         PTT - ( 18 Dec 2023 05:57 )  PTT:30.8 sec  Urinalysis Basic - ( 18 Dec 2023 05:57 )    Color: x / Appearance: x / SG: x / pH: x  Gluc: 220 mg/dL / Ketone: x  / Bili: x / Urobili: x   Blood: x / Protein: x / Nitrite: x   Leuk Esterase: x / RBC: x / WBC x   Sq Epi: x / Non Sq Epi: x / Bacteria: x

## 2023-12-18 NOTE — PROGRESS NOTE ADULT - TIME BILLING
Care coordination with EP  Bedside evaluation and exam  Discussion of plan  Care coordination with anesthesia  Updates in plan of care

## 2023-12-18 NOTE — PROGRESS NOTE ADULT - SUBJECTIVE AND OBJECTIVE BOX
Chief complaint: Patient is a 83y old  Male who presents with a chief complaint of Afib with RVR (14 Dec 2023 12:39)    Interval history: pt seen and examined at bedside, denies palpitations/SOB/chest pain/dizziness currently  HR on tele 130's aflutter   12/17th amiodarone loaded and switchd to PO amiodarone 200mg bid     Review of systems: A complete 10-point review of systems was obtained and is negative except as stated in the interval history.    Vitals:    T(C): 36.7 (18 Dec 2023 07:44), Max: 36.7 (17 Dec 2023 15:31)  T(F): 98 (18 Dec 2023 07:44), Max: 98.1 (17 Dec 2023 15:31)  HR: 122 (18 Dec 2023 07:44) (120 - 133)  BP: 114/87 (18 Dec 2023 07:44) (112/79 - 124/83)  BP(mean): 96 (18 Dec 2023 07:44) (90 - 96)    RR: 18 (18 Dec 2023 07:44) (13 - 20)  SpO2: 96% (18 Dec 2023 07:44) (94% - 97%)    O2 Parameters below as of 18 Dec 2023 04:00  Patient On (Oxygen Delivery Method): room air      Ins & outs:     12-13 @ 07:01  -  12-14 @ 07:00  --------------------------------------------------------  IN: 545 mL / OUT: 1550 mL / NET: -1005 mL    12-14 @ 07:01  -  12-14 @ 13:10  --------------------------------------------------------  IN: 0 mL / OUT: 275 mL / NET: -275 mL      Weight trend:  Weight (kg): 92.2 (12-13)    Physical exam:  General: No apparent distress  HEENT: Anicteric sclera. Moist mucous membranes. JVP negative    Cardiac: irregular rate and rhythm. No murmurs, rubs, or gallops.   Vascular: Symmetric radial pulses. Dorsalis pedis pulses palpable.   Respiratory: Normal effort. Clear to ascultation.   Abdomen: Soft, nontender. Audible bowel sounds.   Extremities: Warm with no edema. No cyanosis or clubbing.   Skin: Warm and dry. No rash.   Neurologic: Grossly normal motor function.   Psychiatric: Oriented to person, place, and time.     Data reviewed:  - Telemetry: HR on tele 130's aflutter   - ECG < from: 12 Lead ECG (12.14.23 @ 06:39) >  Diagnosis Line Atrial flutter with variable A-V block  Right bundle branch block  Abnormal ECG    < end of copied text >    - TTE < from: TTE Echo Complete w/o Contrast w/ Doppler (06.30.23 @ 14:35) >    Summary:   1. LV Ejection Fraction by Nunes's Method with a biplane EF of 58 %.   2. Normal global left ventricular systolic function.   3. Normal left atrial size.   4. Normal right atrial size.    < end of copied text >    - Chest x-ray < from: Xray Chest 1 View- PORTABLE-Urgent (12.13.23 @ 09:22) >    Impression:    No radiographic evidence of acute cardiopulmonary disease.    < end of copied text >  < from: CT Angio Chest PE Protocol w/ IV Cont (12.13.23 @ 11:09) >  IMPRESSION:    No evidence of pulmonary embolism.    < end of copied text >    - Stress test: < from: NM Nuclear Stress Pharmacologic Multiple (07.01.23 @ 14:16) >  Impression:  1. NORMAL LEXISCAN / REST MYOCARDIAL PERFUSION TOMOGRAPHY, WITH NO   EVIDENCE FOR ISCHEMIA DURING LEXISCAN INFUSION.  2. NORMAL RESTING LEFT VENTRICULAR WALL MOTION AND WALL THICKENING.  3. LEFT VENTRICULAR EJECTION FRACTION OF  58 % WHICH IS WITHIN RANGE OF   NORMAL.    < end of copied text >       - Labs:                        13.0   6.22  )-----------( 140      ( 18 Dec 2023 05:57 )             36.8     12-18    137  |  107  |  16  ----------------------------<  220<H>  4.4   |  19  |  1.0    Ca    8.6      18 Dec 2023 05:57  Mg     1.9     12-18    TPro  5.3<L>  /  Alb  3.2<L>  /  TBili  0.4  /  DBili  <0.2  /  AST  18  /  ALT  19  /  AlkPhos  82  12-16    LIVER FUNCTIONS - ( 16 Dec 2023 20:57 )  Alb: 3.2 g/dL / Pro: 5.3 g/dL / ALK PHOS: 82 U/L / ALT: 19 U/L / AST: 18 U/L / GGT: x           PT/INR - ( 18 Dec 2023 05:57 )   PT: 14.50 sec;   INR: 1.27 ratio         PTT - ( 18 Dec 2023 05:57 )  PTT:30.8 sec      Lactate Trend    Urinalysis Basic - ( 18 Dec 2023 05:57 )    Color: x / Appearance: x / SG: x / pH: x  Gluc: 220 mg/dL / Ketone: x  / Bili: x / Urobili: x   Blood: x / Protein: x / Nitrite: x   Leuk Esterase: x / RBC: x / WBC x   Sq Epi: x / Non Sq Epi: x / Bacteria: x            Medications:  apixaban 5 milliGRAM(s) Oral every 12 hours  atorvastatin 40 milliGRAM(s) Oral at bedtime  dextrose 50% Injectable 12.5 Gram(s) IV Push once  dextrose 50% Injectable 25 Gram(s) IV Push once  dextrose 50% Injectable 25 Gram(s) IV Push once  glucagon  Injectable 1 milliGRAM(s) IntraMuscular once  insulin lispro (ADMELOG) corrective regimen sliding scale   SubCutaneous at bedtime  insulin lispro (ADMELOG) corrective regimen sliding scale   SubCutaneous three times a day before meals  metoprolol tartrate 25 milliGRAM(s) Oral every 6 hours  pantoprazole    Tablet 40 milliGRAM(s) Oral before breakfast  tamsulosin 0.4 milliGRAM(s) Oral two times a day    Drips:  dextrose 5%. 1000 milliLiter(s) (50 mL/Hr) IV Continuous <Continuous>  dextrose 5%. 1000 milliLiter(s) (100 mL/Hr) IV Continuous <Continuous>    PRN:     Allergies    No Known Allergies    Intolerances

## 2023-12-18 NOTE — CONSULT NOTE ADULT - ASSESSMENT
83 year old male patient of Adrianne Fabian with PMHx of HTN, HLD, DM, Paroxysmal AFib initially diagnosed in 1994, SVT,  BPH, GERD, and prostate CA, s/p recent admission had Aflutter cardioverted on 12/14 (200J) and discharged on 12/15.  Pt presented with SOB, palpitations, dizziness. Patient was seen by EP last admission and was discharged on Eliquis , metoprolol 25 q12 and Multaq 400 mg bid, out of which patient took Eliquis this AM, and took last dose of metoprolol yesterday evening, however did not take Multaq yet due to potential interaction with Simvastatin.   Pt is back in Aflutter with RVR.  He was started on Amiodarone gtt over the weekend    Aflutter with RVR  DM  HTN  HLD  PAfib    Plan:  Cont amiodarone gtt for now  Recommend re-attempt DCCV on amiodarone  Cont Eliquis  Cont BB - titrate as needed for HR

## 2023-12-18 NOTE — PROGRESS NOTE ADULT - NS ATTEND AMEND GEN_ALL_CORE FT
83yoM with paroxsymal Afib in 1994 (not on anticoagulation), HTN, HLD, DM, hospitalization in 6/2023 for SVT, and recent hospitalization last week for Aflutter s/p GANGA/DCCV who represents with Aflutter.     Patient was discharged on 12/15/23 with Bb and Multaq. His pharmacy informed him there was an interaction between Multaq and simvastatin, so he did not take the AAD. He is now in Aflutter again. He was loaded with IV amiodarone over the weekend, and is now on amiodarone 200 mg PO BID. Plan for DCCV today (no need for GANGA) and will reconsider use of sotalol versus amiodarone. EP consulted.     Plan:   - Continue Eliquis 5 mg BID  - Patient to remain NPO  - DCCV  - On amiodarone 200 mg BID and Lopressor 25 mg q6h  - EP consulted
Please see H&P for full assessment and plan.

## 2023-12-18 NOTE — PROGRESS NOTE ADULT - NS_MD_PANP_GEN_ALL_CORE
The patient is a 16y Male complaining of  Attending and PA/NP shared services statement (NON-critical care):

## 2023-12-18 NOTE — CONSULT NOTE ADULT - NS ATTEND AMEND GEN_ALL_CORE FT
Detailed discussion about options  Recurrent AFL- typical appearing  After discussion of pros-cons including CVA, death, they decided to opt for DCCV for now  Amiodarone for short term use  Baseline TSH/LFT  Eliquis  OP f-up for possible AFL ablation + ILR

## 2023-12-19 ENCOUNTER — TRANSCRIPTION ENCOUNTER (OUTPATIENT)
Age: 83
End: 2023-12-19

## 2023-12-19 VITALS
SYSTOLIC BLOOD PRESSURE: 120 MMHG | HEART RATE: 60 BPM | RESPIRATION RATE: 19 BRPM | DIASTOLIC BLOOD PRESSURE: 60 MMHG | OXYGEN SATURATION: 97 % | TEMPERATURE: 97 F

## 2023-12-19 LAB
ANION GAP SERPL CALC-SCNC: 11 MMOL/L — SIGNIFICANT CHANGE UP (ref 7–14)
ANION GAP SERPL CALC-SCNC: 11 MMOL/L — SIGNIFICANT CHANGE UP (ref 7–14)
BUN SERPL-MCNC: 20 MG/DL — SIGNIFICANT CHANGE UP (ref 10–20)
BUN SERPL-MCNC: 20 MG/DL — SIGNIFICANT CHANGE UP (ref 10–20)
CALCIUM SERPL-MCNC: 8.8 MG/DL — SIGNIFICANT CHANGE UP (ref 8.4–10.5)
CALCIUM SERPL-MCNC: 8.8 MG/DL — SIGNIFICANT CHANGE UP (ref 8.4–10.5)
CHLORIDE SERPL-SCNC: 107 MMOL/L — SIGNIFICANT CHANGE UP (ref 98–110)
CHLORIDE SERPL-SCNC: 107 MMOL/L — SIGNIFICANT CHANGE UP (ref 98–110)
CO2 SERPL-SCNC: 20 MMOL/L — SIGNIFICANT CHANGE UP (ref 17–32)
CO2 SERPL-SCNC: 20 MMOL/L — SIGNIFICANT CHANGE UP (ref 17–32)
CREAT SERPL-MCNC: 1.1 MG/DL — SIGNIFICANT CHANGE UP (ref 0.7–1.5)
CREAT SERPL-MCNC: 1.1 MG/DL — SIGNIFICANT CHANGE UP (ref 0.7–1.5)
EGFR: 67 ML/MIN/1.73M2 — SIGNIFICANT CHANGE UP
EGFR: 67 ML/MIN/1.73M2 — SIGNIFICANT CHANGE UP
GLUCOSE BLDC GLUCOMTR-MCNC: 174 MG/DL — HIGH (ref 70–99)
GLUCOSE BLDC GLUCOMTR-MCNC: 174 MG/DL — HIGH (ref 70–99)
GLUCOSE SERPL-MCNC: 160 MG/DL — HIGH (ref 70–99)
GLUCOSE SERPL-MCNC: 160 MG/DL — HIGH (ref 70–99)
HCT VFR BLD CALC: 38.1 % — LOW (ref 42–52)
HCT VFR BLD CALC: 38.1 % — LOW (ref 42–52)
HGB BLD-MCNC: 12.7 G/DL — LOW (ref 14–18)
HGB BLD-MCNC: 12.7 G/DL — LOW (ref 14–18)
MAGNESIUM SERPL-MCNC: 2 MG/DL — SIGNIFICANT CHANGE UP (ref 1.8–2.4)
MAGNESIUM SERPL-MCNC: 2 MG/DL — SIGNIFICANT CHANGE UP (ref 1.8–2.4)
MCHC RBC-ENTMCNC: 31 PG — SIGNIFICANT CHANGE UP (ref 27–31)
MCHC RBC-ENTMCNC: 31 PG — SIGNIFICANT CHANGE UP (ref 27–31)
MCHC RBC-ENTMCNC: 33.3 G/DL — SIGNIFICANT CHANGE UP (ref 32–37)
MCHC RBC-ENTMCNC: 33.3 G/DL — SIGNIFICANT CHANGE UP (ref 32–37)
MCV RBC AUTO: 92.9 FL — SIGNIFICANT CHANGE UP (ref 80–94)
MCV RBC AUTO: 92.9 FL — SIGNIFICANT CHANGE UP (ref 80–94)
NRBC # BLD: 0 /100 WBCS — SIGNIFICANT CHANGE UP (ref 0–0)
NRBC # BLD: 0 /100 WBCS — SIGNIFICANT CHANGE UP (ref 0–0)
PLATELET # BLD AUTO: 113 K/UL — LOW (ref 130–400)
PLATELET # BLD AUTO: 113 K/UL — LOW (ref 130–400)
PMV BLD: 12.2 FL — HIGH (ref 7.4–10.4)
PMV BLD: 12.2 FL — HIGH (ref 7.4–10.4)
POTASSIUM SERPL-MCNC: 4.5 MMOL/L — SIGNIFICANT CHANGE UP (ref 3.5–5)
POTASSIUM SERPL-MCNC: 4.5 MMOL/L — SIGNIFICANT CHANGE UP (ref 3.5–5)
POTASSIUM SERPL-SCNC: 4.5 MMOL/L — SIGNIFICANT CHANGE UP (ref 3.5–5)
POTASSIUM SERPL-SCNC: 4.5 MMOL/L — SIGNIFICANT CHANGE UP (ref 3.5–5)
RBC # BLD: 4.1 M/UL — LOW (ref 4.7–6.1)
RBC # BLD: 4.1 M/UL — LOW (ref 4.7–6.1)
RBC # FLD: 13.2 % — SIGNIFICANT CHANGE UP (ref 11.5–14.5)
RBC # FLD: 13.2 % — SIGNIFICANT CHANGE UP (ref 11.5–14.5)
SODIUM SERPL-SCNC: 138 MMOL/L — SIGNIFICANT CHANGE UP (ref 135–146)
SODIUM SERPL-SCNC: 138 MMOL/L — SIGNIFICANT CHANGE UP (ref 135–146)
WBC # BLD: 6.6 K/UL — SIGNIFICANT CHANGE UP (ref 4.8–10.8)
WBC # BLD: 6.6 K/UL — SIGNIFICANT CHANGE UP (ref 4.8–10.8)
WBC # FLD AUTO: 6.6 K/UL — SIGNIFICANT CHANGE UP (ref 4.8–10.8)
WBC # FLD AUTO: 6.6 K/UL — SIGNIFICANT CHANGE UP (ref 4.8–10.8)

## 2023-12-19 PROCEDURE — 99239 HOSP IP/OBS DSCHRG MGMT >30: CPT

## 2023-12-19 PROCEDURE — 93010 ELECTROCARDIOGRAM REPORT: CPT

## 2023-12-19 RX ORDER — AMIODARONE HYDROCHLORIDE 400 MG/1
1 TABLET ORAL
Qty: 60 | Refills: 0
Start: 2023-12-19 | End: 2024-01-17

## 2023-12-19 RX ORDER — ROSUVASTATIN CALCIUM 5 MG/1
1 TABLET ORAL
Qty: 30 | Refills: 0
Start: 2023-12-19 | End: 2024-01-17

## 2023-12-19 RX ORDER — SIMVASTATIN 20 MG/1
1 TABLET, FILM COATED ORAL
Refills: 0 | DISCHARGE

## 2023-12-19 RX ORDER — METOPROLOL TARTRATE 50 MG
25 TABLET ORAL EVERY 12 HOURS
Refills: 0 | Status: DISCONTINUED | OUTPATIENT
Start: 2023-12-19 | End: 2023-12-19

## 2023-12-19 RX ADMIN — TAMSULOSIN HYDROCHLORIDE 0.4 MILLIGRAM(S): 0.4 CAPSULE ORAL at 06:06

## 2023-12-19 RX ADMIN — Medication 25 MILLIGRAM(S): at 10:18

## 2023-12-19 RX ADMIN — PANTOPRAZOLE SODIUM 40 MILLIGRAM(S): 20 TABLET, DELAYED RELEASE ORAL at 06:05

## 2023-12-19 RX ADMIN — Medication 1: at 08:13

## 2023-12-19 RX ADMIN — AMIODARONE HYDROCHLORIDE 200 MILLIGRAM(S): 400 TABLET ORAL at 06:07

## 2023-12-19 RX ADMIN — APIXABAN 5 MILLIGRAM(S): 2.5 TABLET, FILM COATED ORAL at 08:13

## 2023-12-19 NOTE — DISCHARGE NOTE PROVIDER - NSDCCPCAREPLAN_GEN_ALL_CORE_FT
PRINCIPAL DISCHARGE DIAGNOSIS  Diagnosis: Atrial fibrillation with RVR  Assessment and Plan of Treatment:      PRINCIPAL DISCHARGE DIAGNOSIS  Diagnosis: Atrial fibrillation with RVR  Assessment and Plan of Treatment: discharged on amiodarone and Metoprolol   monitor TFTS, PFTs, LFTs, CPK levels      SECONDARY DISCHARGE DIAGNOSES  Diagnosis: Hyperlipidemia  Assessment and Plan of Treatment: simvastatin changed to crestor 5 mg daily as patient now on amiodarone

## 2023-12-19 NOTE — DISCHARGE NOTE PROVIDER - PROVIDER TOKENS
PROVIDER:[TOKEN:[[id number]],FOLLOWUP:[2 weeks]] PROVIDER:[TOKEN:[[id number]],FOLLOWUP:[2 weeks]] PROVIDER:[TOKEN:[[id number]],FOLLOWUP:[2 weeks]],FREE:[LAST:[primary care doctor],PHONE:[(   )    -],FAX:[(   )    -],FOLLOWUP:[1 week]],FREE:[LAST:[primary care doctor],PHONE:[(   )    -],FAX:[(   )    -],FOLLOWUP:[1 week]] PROVIDER:[TOKEN:[[id number]],FOLLOWUP:[2 weeks]],FREE:[LAST:[primary care doctor],PHONE:[(   )    -],FAX:[(   )    -],FOLLOWUP:[1 week]],FREE:[LAST:[primary care doctor],PHONE:[(   )    -],FAX:[(   )    -],FOLLOWUP:[1 week]]

## 2023-12-19 NOTE — DISCHARGE NOTE PROVIDER - CARE PROVIDERS DIRECT ADDRESSES
,[email or web address],DirectAddress_Unknown,DirectAddress_Unknown ,[email or web address],DirectAddress_Unknown,DirectAddress_Unknown

## 2023-12-19 NOTE — DISCHARGE NOTE PROVIDER - HOSPITAL COURSE
83 year old male patient of Adrianne Fabian/(EP) with PMHx of HTN, HLD, DM, Paroxysmal AFib initially diagnosed in 1994, SVT,  BPH, GERD, and prostate CA, s/p recent admission had Aflutter cardioverted on 12/14 (200J) and discharged  12/15, presents with SOB, palpitations, dizziness  this morning. Patient was seen by EP and was  discharged on Eliquis , metoprolol 25 q12 and Multaq 400 mg bid, out of which patient took Eliquis this AM, and took last dose of metoprolol yesterday evening, however did not take Multaq yet due to potential interaction with Simvastatin. Patient denies chest pain, cough, URI symptoms, abdominal pain, n/v.     Patient also was admitted at end of June for similar symptoms, found to be in SVT,  was given Adenosine, and was  Cardioverted with 100J by EMS. After his hospitalization, he was discharged with an MCOT and found to have two episodes of SVT. Since then no other arrhythmia. ILR was offered in the outpatient setting (EP- ) , and was declined.     Patient was loaded with IV amiodarone in the ED, and underwent CV (200J)   to NSR on 12/18/23. Patient was seen by EP, and to be discharged on amiodarone 200 mg bid for 2 weeks, thereafter 200 mg daily and also on metoprolol 25 mg q12. Patient NSR HR 60-70's on discharge. EP follow up as outpatient.   Spoke with patient, to be discharged on crestor 5 mg instead of simvastatin  as per studies simvastatin can have more interaction with amiodarone than other statins.   Patient remains clinically stable to be discharged. 83 year old male patient of Adrianne Fabian/(EP) with PMHx of HTN, HLD, DM, Paroxysmal AFib initially diagnosed in 1994, SVT,  BPH, GERD, and prostate CA, s/p recent admission had Aflutter cardioverted on 12/14 (200J) and discharged  12/15, presents with SOB, palpitations, dizziness  this morning. Patient was seen by EP and was  discharged on Eliquis , metoprolol 25 q12 and Multaq 400 mg bid, out of which patient took Eliquis this AM, and took last dose of metoprolol yesterday evening, however did not take Multaq yet due to potential interaction with Simvastatin. Patient denies chest pain, cough, URI symptoms, abdominal pain, n/v.     Patient also was admitted at end of June for similar symptoms, found to be in SVT,  was given Adenosine, and was  Cardioverted with 100J by EMS. After his hospitalization, he was discharged with an MCOT and found to have two episodes of SVT. Since then no other arrhythmia. ILR was offered in the outpatient setting (EP- ) , and was declined.     Patient was loaded with IV amiodarone in the ED, and underwent CV (200J)   to NSR on 12/18/23. Patient was seen by EP, and to be discharged on amiodarone 200 mg bid for 2 weeks, thereafter 200 mg daily and also on metoprolol 25 mg q12. Patient NSR HR 60-70's on discharge. EP follow up as outpatient.   Spoke with patient, to be discharged on crestor 5 mg (LDL 56 12/13/23) instead of simvastatin;   spoke with [facility] pharmacist () as per studies simvastatin can have more interactions with amiodarone than other statins, and crestor is better to use, however as outpatient patient has to be monitored for myalgias.  Patient remains clinically stable to be discharged. 83 year old male patient of Adrianne Fabian/(EP) with PMHx of HTN, HLD, DM, Paroxysmal AFib initially diagnosed in 1994, SVT,  BPH, GERD, and prostate CA, s/p recent admission had Aflutter cardioverted on 12/14 (200J) and discharged  12/15, presents with SOB, palpitations, dizziness  this morning. Patient was seen by EP and was  discharged on Eliquis , metoprolol 25 q12 and Multaq 400 mg bid, out of which patient took Eliquis this AM, and took last dose of metoprolol yesterday evening, however did not take Multaq yet due to potential interaction with Simvastatin. Patient denies chest pain, cough, URI symptoms, abdominal pain, n/v.     Patient also was admitted at end of June for similar symptoms, found to be in SVT,  was given Adenosine, and was  Cardioverted with 100J by EMS. After his hospitalization, he was discharged with an MCOT and found to have two episodes of SVT. Since then no other arrhythmia. ILR was offered in the outpatient setting (EP- ) , and was declined.     Patient was loaded with IV amiodarone in the ED, and underwent CV (200J)   to NSR on 12/18/23. Patient was seen by EP, and to be discharged on amiodarone 200 mg bid for 2 weeks, thereafter 200 mg daily and also on metoprolol 25 mg q12. Patient NSR HR 60-70's on discharge. EP follow up as outpatient.   Spoke with patient, to be discharged on crestor 5 mg (LDL 56 12/13/23) instead of simvastatin;   spoke with [facility] pharmacist () as per studies simvastatin can have more interactions with amiodarone than other statins, and crestor is better to use, however as outpatient patient has to be monitored for myalgias.  Patient remains clinically stable to be discharged.

## 2023-12-19 NOTE — DISCHARGE NOTE PROVIDER - CARE PROVIDER_API CALL
Marium Joaquin  Cardiac Electrophysiology  [address] [place] [id number]  Phone: [phone]  Fax: [phone]  Follow Up Time: 2 weeks   Marium Joaquin  Cardiac Electrophysiology  [address] [place] [id number]  Phone: [phone]  Fax: [phone]  Follow Up Time: 2 weeks   Marium Joaquin  Cardiac Electrophysiology  [address] [place] [id number]  Phone: [phone]  Fax: [phone]  Follow Up Time: 2 weeks    primary care doctor,   Phone: (   )    -  Fax: (   )    -  Follow Up Time: 1 week    primary care doctor,   Phone: (   )    -  Fax: (   )    -  Follow Up Time: 1 week   Marium Joaquin  Cardiac Electrophysiology  [address] [place] [id number]  Phone: [phone]  Fax: [phone]  Follow Up Time: 2 weeks    primary care doctor,   Phone: (   )    -  Fax: (   )    -  Follow Up Time: 1 week    primary care doctor,   Phone: (   )    -  Fax: (   )    -  Follow Up Time: 1 week

## 2023-12-19 NOTE — DISCHARGE NOTE PROVIDER - NSDCFUSCHEDAPPT_GEN_ALL_CORE_FT
Sarika Mcintosh  [facility] PreAdmits  Scheduled Appointment: 01/04/2024    [facility]  [address]  Scheduled Appointment: 01/04/2024     Sarika Mcintosh  [facility] PreAdmits  Scheduled Appointment: 01/04/2024    [facility]  [address]  Scheduled Appointment: 01/04/2024

## 2023-12-19 NOTE — DISCHARGE NOTE PROVIDER - NS AS DC PROVIDER CONTACT Y/N MULTI
Hospitalist Discharge Summary     Patient ID:  Katie Gaona  [id number]  40 y.o.  1951 2/4/2022    PCP on record: Андрей Linda MD    Admit date: 2/4/2022  Discharge date and time: 2/12/2022    DISCHARGE DIAGNOSIS:  CARBAJAL cirrhosis  Recurrent ascities    Hyponatremia   Anemia  Thrombocytopenia   Wall thickening involvine ascending and transverse colon  COPD  Hypothyroidism   CAD s/p remote PCI/stent  HTN  Dyslipidemia   Vitamin B12 deficiency   Depression    Generalized deconditioning   Weakness  Recurrent falls    CONSULTATIONS:  IP CONSULT TO GASTROENTEROLOGY  IP CONSULT TO PALLIATIVE CARE - PROVIDER  IP CONSULT TO NEPHROLOGY  IP CONSULT TO HOSPITALIST    Excerpted HPI from H&P of Yasmeen Sal MD:  Charline Moran is a 72 y.o. female with liver biopsy proven liver cirrhosis 8/2021, seen by Dr. Oleg Gold 10/21, recent admission 12/21 for recurrent falls, generalized weakness from B12 deficiency sent from [facility] for recurrent abdominal distention. She is getting rehab since discharge on 12/6/21. Patient previously not had decompensated liver cirrhosis. Had thrombocytopenia and mild coagulopathy. On 1/31/22, sent to ER for abdominal distention. Had US guided paracentesis with removal of 7100ml clear yellow ascites. At SNF, she is on aldactone 25mg and lasix 20mg daily.      She is sent back to ER today for recurrent ascites with recurrent increased abdominal distention. Denies fever, chills, n/v.  CT abdomen without contrast with cirrhosis, moderate to large volume ascites, wall thickening involving ascending and transverse colon. \"    ______________________________________________________________________  DISCHARGE SUMMARY/HOSPITAL COURSE:  for full details see H&P, daily progress notes, labs, consult notes. CARBAJAL cirrhosis  Recurrent ascities    CT abdomen/pelvis 02/04/22:  1.  Cirrhosis. Moderate to large volume ascites.   2.  Wall thickening involving the ascending and transverse colon, maybe on the basis of portal colopathy, but correlate for infectious or inflammatory colitis. 3.  Healing lateral right 8th-10th rib fractures. 4.  Cholelithiasis. Ascites fluid culture 02/04/22:  NG at 4 days.   - Patient was evaluated by Gi while hospitalized. - MELD = 18.  - INR stable. - S/P US-guided paracentesis in ED for 5600 cc with repeat paracentesis on 02/08/22 for 3100 cc.  - On 02/11/22 patient's abdomen was more distended and she complained of mild discomfort/bloating.    - Patient was sent for peritoneal drain on 02/11/22 however once in the IR department patient requested that the drain be placed higher on her abdomen and there was insufficient ascites to have drain placed in her desired location. Patient declined having drain placed and will need to be reassessed for drain at a later date when there is sufficient ascites to allow the drain to be placed higher on her abdomen.      - Patient received 8 doses of albumin 12.5 gm. - Continue furosemide 40 mg po bid. - Continue spironolactone 50 mg po daily. - Continue KCl 20 mEq po daily, serum K+ remained stable. - Continue rifaximin 550 mg po bid.    - Discharge to [address] [place] [address],[address] with hospice.      Hyponatremia   - Patient was followed by nephrology while hospitalized. - Last recorded serum Na+ 130.    - Continue 1200 cc fluid restriction.       Anemia  Thrombocytopenia   - Thrombocytopenia 2/2 liver disease.  - No sign of active hemorrhage.       Wall thickening involvine ascending and transverse colon  - Patient was evaluated by GI while hospitalized. - Likely related to portal colopathy.  - Conservative approach.       COPD  - Continue budesonide 250 mcg nebs bid.     Hypothyroidism   - Continue levothyroxine 112 mcg po daily.     CAD s/p remote PCI/stent  HTN  Dyslipidemia   - Continue asa 81 mg po daily.   - Diuretic as noted above.    - Not on lipid lowering agent 2/2 cirrhosis.     Vitamin B12 deficiency   - Continue cyanocobalamin 1000 mcg IM q30d.       Depression    - Continue sertraline 50 mg po daily.       Generalized deconditioning   Weakness  Recurrent falls  - Supportive care  ______________________________________________________________________  Patient seen and examined by me on discharge day. Pertinent Findings:     General:  A/A/O X 3. NAD. HEENT:  Normocephalic. Sclera anicteric. EOMI. Mucous membranes moist.    Chest:  Resps even/unlabored with symmetrical CWE. Air entry diminished at bases. Lungs CTA. No use of accessory muscles. CV:  RRR. Normal S1/S2. No M/C/R appreciated. No JVD. Trace to 1 mm pretibial edema corrina. Cap refill < 3 sec. Peripheral pulses 1+. GI:  Abdomen soft/NT. Minimal distention. ABT X 4.    :  Voiding. Neurologic:  Nonfocal.  CN II-XII grossly intact. Face symmetrical.  Speech normal.     Psych:  Cooperative. No anxiety or agitation. No suicidal/homicidal ideation. Skin:  Warm and color appropriate. No rashes or jaundice. Turgor elastic.   _______________________________________________________________________  DISCHARGE MEDICATIONS:   Current Discharge Medication List      START taking these medications    Details   rifAXIMin (XIFAXAN) 550 mg tablet Take 1 Tablet by mouth two (2) times a day. Qty: 60 Tablet, Refills: 0  Start date: 2/12/2022         CONTINUE these medications which have CHANGED    Details   furosemide (LASIX) 40 mg tablet Take 1 Tablet by mouth two (2) times a day. Indications: accumulation of fluid caused by cirrhosis of the liver  Qty: 60 Tablet, Refills: 0  Start date: 2/12/2022         CONTINUE these medications which have NOT CHANGED    Details   bisacodyL (DULCOLAX) 10 mg supp Insert 10 mg into rectum daily as needed for Constipation (if no BMO from MOM/lax). lactulose (CHRONULAC) 10 gram/15 mL solution Take 20 g by mouth daily as needed (constipation).       levothyroxine (SYNTHROID) 112 mcg tablet Take 112 mcg by mouth Daily (before breakfast). polyethylene glycol (Miralax) 17 gram packet Take 17 g by mouth daily as needed for Constipation. potassium chloride SA (KLOR-CON M15) 15 mEq tablet Take 20 mEq by mouth daily. ondansetron hcl (ZOFRAN) 4 mg tablet Take 4 mg by mouth every six (6) hours as needed for Nausea or Vomiting.      sertraline (ZOLOFT) 100 mg tablet Take 0.5 Tablets by mouth daily. Qty: 30 Tablet, Refills: 3      cyanocobalamin (VITAMIN B12) 1,000 mcg/mL injection 1000 mg IM every wednesday starting 12/8 x 4 shots then monthly  Indications: inadequate vitamin B12  Qty: 1 mL, Refills: 0    Associated Diagnoses: B12 deficiency; Ambulatory dysfunction      aspirin delayed-release 81 mg tablet Take 81 mg by mouth daily. budesonide (PULMICORT) 180 mcg/actuation aepb inhaler Take 2 Puffs by inhalation daily. albuterol (PROVENTIL VENTOLIN) 2.5 mg /3 mL (0.083 %) nebu Take 2.5 mg by inhalation every four (4) hours as needed for Wheezing. spironolactone (ALDACTONE) 25 mg tablet Take 25 mg by mouth daily. Comments: 1/2 of a 50mg tablet         STOP taking these medications       sodium phosphates (FLEET'S) 9.5-3.5 gram/59 mL enema Comments:   Reason for Stopping:         magnesium hydroxide (Re[employer] Milk of Magnesia) 400 mg/5 mL suspension Comments:   Reason for Stopping:         atorvastatin (LIPITOR) 80 mg tablet Comments:   Reason for Stopping:                 Patient Follow Up Instructions:    Activity: Activity as tolerated  Diet: Easy to chew with 1200 cc fluid restriction  Wound Care: None needed    Follow-up as noted below  Follow-up tests/labs per PCP/hospice provider    Follow-up Information     Follow up With Specialties Details Why Contact Info    [address] [facility] [facility]    Landon Felix MD Family Medicine  As needed [address]  [address]  In 1 week Call to schedule outpatient peritoneal catheter insertion once ascites (fluid in the abdomen) [address] [place]  [phone]        ________________________________________________________________    Risk of deterioration: High    Condition at Discharge:  Stable  __________________________________________________________________    Disposition  [address] with hospice    ____________________________________________________________________    Code Status: DNR/DNI  ___________________________________________________________________      Total time in minutes spent coordinating this discharge (includes going over instructions, follow-up, prescriptions, and preparing report for sign off to her PCP) :  >30 minutes    Signed:  Karlo Arauz NP Yes

## 2023-12-19 NOTE — DISCHARGE NOTE NURSING/CASE MANAGEMENT/SOCIAL WORK - NSDCPEFALRISK_GEN_ALL_CORE
For information on Fall & Injury Prevention, visit: [email or web address] OR  [email or web address] OR  https://www.cdc.gov/steadi/patient.html For information on Fall & Injury Prevention, visit: [email or web address] OR  [email or web address] OR  https://www.cdc.gov/steadi/patient.html

## 2023-12-19 NOTE — DISCHARGE NOTE NURSING/CASE MANAGEMENT/SOCIAL WORK - PATIENT PORTAL LINK FT
You can access the FollowMyHealth Patient Portal offered by [facility] by registering at the following website: [email or web address]. By joining [employer]’s FollowMyHealth portal, you will also be able to view your health information using other applications (apps) compatible with our system. You can access the FollowMyHealth Patient Portal offered by [facility] by registering at the following website: [email or web address]. By joining [employer]’s FollowMyHealth portal, you will also be able to view your health information using other applications (apps) compatible with our system.

## 2023-12-19 NOTE — DISCHARGE NOTE PROVIDER - NSDCMRMEDTOKEN_GEN_ALL_CORE_FT
amiodarone 200 mg oral tablet: 1 tab(s) orally 2 times a day take 1 tablet two times a day for 2 weeks, thereafter take 1 tablet daily. STOP MULTAQ  apixaban 5 mg oral tablet: 1 tab(s) orally every 12 hours  Coenzyme Q10 200 mg oral capsule: 1 tab(s) orally once a day  Crestor 5 mg oral tablet: 1 tab(s) orally once a day (at bedtime) STOP SIMVASTATIN  Flomax 0.4 mg oral capsule: 1 tab(s) orally 2 times a day  glimepiride 1 mg oral tablet: 1 tab(s) orally 2 times a day  metFORMIN 1000 mg oral tablet: 1 tab(s) orally 2 times a day  metoprolol tartrate 25 mg oral tablet: 1 tab(s) orally 2 times a day  Omega-3 Fish Oil 1000 mg oral capsule: 1 tab(s) orally 2 times a day  omeprazole 20 mg oral delayed release tablet: 1 tab(s) orally once a day

## 2023-12-21 DIAGNOSIS — I21.A1 MYOCARDIAL INFARCTION TYPE 2: ICD-10-CM

## 2023-12-21 DIAGNOSIS — I10 ESSENTIAL (PRIMARY) HYPERTENSION: ICD-10-CM

## 2023-12-21 DIAGNOSIS — Z79.84 LONG TERM (CURRENT) USE OF ORAL HYPOGLYCEMIC DRUGS: ICD-10-CM

## 2023-12-21 DIAGNOSIS — E11.9 TYPE 2 DIABETES MELLITUS WITHOUT COMPLICATIONS: ICD-10-CM

## 2023-12-21 DIAGNOSIS — Z98.1 ARTHRODESIS STATUS: ICD-10-CM

## 2023-12-21 DIAGNOSIS — I48.0 PAROXYSMAL ATRIAL FIBRILLATION: ICD-10-CM

## 2023-12-21 DIAGNOSIS — I47.10 SUPRAVENTRICULAR TACHYCARDIA, UNSPECIFIED: ICD-10-CM

## 2023-12-21 DIAGNOSIS — E78.5 HYPERLIPIDEMIA, UNSPECIFIED: ICD-10-CM

## 2023-12-21 DIAGNOSIS — Z79.82 LONG TERM (CURRENT) USE OF ASPIRIN: ICD-10-CM

## 2023-12-21 DIAGNOSIS — I45.10 UNSPECIFIED RIGHT BUNDLE-BRANCH BLOCK: ICD-10-CM

## 2023-12-21 DIAGNOSIS — Z85.46 PERSONAL HISTORY OF MALIGNANT NEOPLASM OF PROSTATE: ICD-10-CM

## 2023-12-21 DIAGNOSIS — N40.0 BENIGN PROSTATIC HYPERPLASIA WITHOUT LOWER URINARY TRACT SYMPTOMS: ICD-10-CM

## 2023-12-21 DIAGNOSIS — Z87.891 PERSONAL HISTORY OF NICOTINE DEPENDENCE: ICD-10-CM

## 2023-12-21 DIAGNOSIS — K21.9 GASTRO-ESOPHAGEAL REFLUX DISEASE WITHOUT ESOPHAGITIS: ICD-10-CM

## 2023-12-21 DIAGNOSIS — I48.92 UNSPECIFIED ATRIAL FLUTTER: ICD-10-CM

## 2023-12-22 DIAGNOSIS — K21.9 GASTRO-ESOPHAGEAL REFLUX DISEASE WITHOUT ESOPHAGITIS: ICD-10-CM

## 2023-12-22 DIAGNOSIS — Z79.84 LONG TERM (CURRENT) USE OF ORAL HYPOGLYCEMIC DRUGS: ICD-10-CM

## 2023-12-22 DIAGNOSIS — I48.91 UNSPECIFIED ATRIAL FIBRILLATION: ICD-10-CM

## 2023-12-22 DIAGNOSIS — I44.0 ATRIOVENTRICULAR BLOCK, FIRST DEGREE: ICD-10-CM

## 2023-12-22 DIAGNOSIS — Z85.46 PERSONAL HISTORY OF MALIGNANT NEOPLASM OF PROSTATE: ICD-10-CM

## 2023-12-22 DIAGNOSIS — I48.92 UNSPECIFIED ATRIAL FLUTTER: ICD-10-CM

## 2023-12-22 DIAGNOSIS — N40.0 BENIGN PROSTATIC HYPERPLASIA WITHOUT LOWER URINARY TRACT SYMPTOMS: ICD-10-CM

## 2023-12-22 DIAGNOSIS — Z79.01 LONG TERM (CURRENT) USE OF ANTICOAGULANTS: ICD-10-CM

## 2023-12-22 DIAGNOSIS — I35.1 NONRHEUMATIC AORTIC (VALVE) INSUFFICIENCY: ICD-10-CM

## 2023-12-22 DIAGNOSIS — Z98.1 ARTHRODESIS STATUS: ICD-10-CM

## 2023-12-22 DIAGNOSIS — E11.9 TYPE 2 DIABETES MELLITUS WITHOUT COMPLICATIONS: ICD-10-CM

## 2023-12-22 DIAGNOSIS — E78.5 HYPERLIPIDEMIA, UNSPECIFIED: ICD-10-CM

## 2023-12-22 DIAGNOSIS — I24.89 OTHER FORMS OF ACUTE ISCHEMIC HEART DISEASE: ICD-10-CM

## 2023-12-22 DIAGNOSIS — I34.0 NONRHEUMATIC MITRAL (VALVE) INSUFFICIENCY: ICD-10-CM

## 2023-12-22 DIAGNOSIS — I95.9 HYPOTENSION, UNSPECIFIED: ICD-10-CM

## 2024-01-04 ENCOUNTER — OUTPATIENT (OUTPATIENT)
Dept: OUTPATIENT SERVICES | Facility: HOSPITAL | Age: 84
LOS: 1 days | End: 2024-01-04
Payer: MEDICARE

## 2024-01-04 ENCOUNTER — LABORATORY RESULT (OUTPATIENT)
Age: 84
End: 2024-01-04

## 2024-01-04 ENCOUNTER — APPOINTMENT (OUTPATIENT)
Dept: HEMATOLOGY ONCOLOGY | Facility: CLINIC | Age: 84
End: 2024-01-04
Payer: MEDICARE

## 2024-01-04 DIAGNOSIS — D69.6 THROMBOCYTOPENIA, UNSPECIFIED: ICD-10-CM

## 2024-01-04 DIAGNOSIS — Z86.2 PERSONAL HISTORY OF DISEASES OF THE BLOOD AND BLOOD-FORMING ORGANS AND CERTAIN DISORDERS INVOLVING THE IMMUNE MECHANISM: ICD-10-CM

## 2024-01-04 DIAGNOSIS — Z98.1 ARTHRODESIS STATUS: Chronic | ICD-10-CM

## 2024-01-04 LAB
HCT VFR BLD CALC: 37.8 %
HGB BLD-MCNC: 13.5 G/DL
MCHC RBC-ENTMCNC: 31.7 PG
MCHC RBC-ENTMCNC: 35.7 G/DL
MCV RBC AUTO: 88.7 FL
PLATELET # BLD AUTO: 145 K/UL
PMV BLD: 11.1 FL
RBC # BLD: 4.26 M/UL
RBC # FLD: 13.2 %
WBC # FLD AUTO: 5.65 K/UL

## 2024-01-04 PROCEDURE — 99213 OFFICE O/P EST LOW 20 MIN: CPT

## 2024-01-04 PROCEDURE — 85027 COMPLETE CBC AUTOMATED: CPT

## 2024-01-05 DIAGNOSIS — D69.6 THROMBOCYTOPENIA, UNSPECIFIED: ICD-10-CM

## 2024-01-17 ENCOUNTER — APPOINTMENT (OUTPATIENT)
Dept: ELECTROPHYSIOLOGY | Facility: CLINIC | Age: 84
End: 2024-01-17
Payer: MEDICARE

## 2024-01-17 VITALS
BODY MASS INDEX: 26.37 KG/M2 | WEIGHT: 199 LBS | HEART RATE: 65 BPM | SYSTOLIC BLOOD PRESSURE: 134 MMHG | TEMPERATURE: 98 F | DIASTOLIC BLOOD PRESSURE: 78 MMHG | HEIGHT: 73 IN

## 2024-01-17 DIAGNOSIS — I10 ESSENTIAL (PRIMARY) HYPERTENSION: ICD-10-CM

## 2024-01-17 DIAGNOSIS — I47.10 SUPRAVENTRICULAR TACHYCARDIA, UNSPECIFIED: ICD-10-CM

## 2024-01-17 PROCEDURE — 93000 ELECTROCARDIOGRAM COMPLETE: CPT

## 2024-01-17 PROCEDURE — 99214 OFFICE O/P EST MOD 30 MIN: CPT

## 2024-01-17 RX ORDER — SIMVASTATIN 40 MG/1
40 TABLET, FILM COATED ORAL DAILY
Refills: 0 | Status: COMPLETED | COMMUNITY
End: 2024-01-17

## 2024-01-17 RX ORDER — AMIODARONE HYDROCHLORIDE 200 MG/1
200 TABLET ORAL DAILY
Qty: 90 | Refills: 3 | Status: ACTIVE | COMMUNITY

## 2024-01-17 RX ORDER — ROSUVASTATIN CALCIUM 5 MG/1
5 TABLET, FILM COATED ORAL
Qty: 45 | Refills: 3 | Status: ACTIVE | COMMUNITY

## 2024-01-17 RX ORDER — APIXABAN 5 MG/1
5 TABLET, FILM COATED ORAL
Qty: 3 | Refills: 3 | Status: ACTIVE | COMMUNITY

## 2024-01-21 PROBLEM — I47.10 PAROXYSMAL SVT (SUPRAVENTRICULAR TACHYCARDIA): Status: ACTIVE | Noted: 2023-08-09

## 2024-01-30 PROBLEM — I10 ESSENTIAL HYPERTENSION: Status: ACTIVE | Noted: 2023-07-19

## 2024-01-30 NOTE — ASSESSMENT
[FreeTextEntry1] : # Paroxysmal AFlutter s/p cardioversion 12/18/23 - On Amio. Rec pulm and ophtho follow up.  - Cont Eliquis 5 mg BID for CHADS-VASC score of 4. Denies any s/sx of bleeding. Feels well.   # Paroxysmal "SVT" - Cont Metoprolol Tartrate 25 mg BID - As there is no documented evidence of the arrhythmia occurrence would suggest an internal loop recorder implant prior to discharge. The patient and family wish to DEFER at this time.  # HTN - BP well controlled - Cont Enalapril 2.5 mg daily, Metoprolol Tartrate 25 mg BID - 2g Na diet enforced  I have also advised the patient to go to the nearest emergency room if he experiences any chest pain, dyspnea, syncope, or has any other compelling symptoms.  Follow up in 4-6 mo

## 2024-01-30 NOTE — CARDIOLOGY SUMMARY
[de-identified] : 1/17/2024 NSR (HR 65 bpm), 1st AVB ( msec), RBBB 8/9/2023 NSR (HR 63 bpm), 1st AVB ( msec), RBBB [de-identified] : 7/15-8/1/2023 17 days AVG HR 65 bpm Two episodes of SVT  < 1% PACs. <1 % PVCs 35 symptom episodes c/w NSR (sx listed as taking meds) [de-identified] : 7/1/2023  1. NORMAL LEXISCAN / REST MYOCARDIAL PERFUSION TOMOGRAPHY, WITH NO EVIDENCE FOR ISCHEMIA DURING LEXISCAN INFUSION. 2. NORMAL RESTING LEFT VENTRICULAR WALL MOTION AND WALL THICKENING. 3. LEFT VENTRICULAR EJECTION FRACTION OF  58 % WHICH IS WITHIN RANGE OF NORMAL. [de-identified] : 7/20/2023  1. LV Ejection Fraction by Nunes's Method with a biplane EF of 58 %.  2. Normal global left ventricular systolic function.  3. Normal left atrial size.  4. Normal right atrial size.

## 2024-01-30 NOTE — HISTORY OF PRESENT ILLNESS
[FreeTextEntry1] : Cardio: Dr. Bobby Parker  PCP: Dr. Veto Williamson MD  84 yo M with history of HTN, DM, DLD, GERD, prostate cancer (status post seed implant in 2008), c-spine fusion, paroxysmal atrial fibrillation not on anticoagulation (unknown reason) recently admitted for acute onset shortness of breath. During EMS transport, EMT noted the patient to have SVT with HR up 205 bpm on ekg for which he was given 6 and 12 adenosine which did not convert his rhythm but he became hypotensive and was cardioverted with 100J and ketamine with successful cardioversion to NSR. The patient states he was with atrial fibrillation in 1994 but was never started on anticoagulation and nothing was mentioned further about it on follow ups per patient. 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 There is no documentation of the event that took place by EMS so we are unable to determine what arrhythmia the patient had at the time.   Patient presents as follow up. Since his last visit, he was in the hospital and found to have AFlutter. He was cardioverted 12/18/23 and started on Eliquis and Amio. He denies chest pain or palpitations. No syncope.

## 2024-07-24 ENCOUNTER — APPOINTMENT (OUTPATIENT)
Dept: ELECTROPHYSIOLOGY | Facility: CLINIC | Age: 84
End: 2024-07-24
Payer: MEDICARE

## 2024-07-24 VITALS
DIASTOLIC BLOOD PRESSURE: 72 MMHG | HEART RATE: 65 BPM | WEIGHT: 196 LBS | TEMPERATURE: 97.1 F | HEIGHT: 73 IN | BODY MASS INDEX: 25.98 KG/M2 | SYSTOLIC BLOOD PRESSURE: 117 MMHG

## 2024-07-24 DIAGNOSIS — I10 ESSENTIAL (PRIMARY) HYPERTENSION: ICD-10-CM

## 2024-07-24 DIAGNOSIS — I48.92 UNSPECIFIED ATRIAL FLUTTER: ICD-10-CM

## 2024-07-24 DIAGNOSIS — I47.10 SUPRAVENTRICULAR TACHYCARDIA, UNSPECIFIED: ICD-10-CM

## 2024-07-24 PROCEDURE — 93000 ELECTROCARDIOGRAM COMPLETE: CPT

## 2024-07-24 PROCEDURE — 99215 OFFICE O/P EST HI 40 MIN: CPT

## 2024-07-24 PROCEDURE — G2211 COMPLEX E/M VISIT ADD ON: CPT

## 2024-07-24 NOTE — CARDIOLOGY SUMMARY
[de-identified] : 7/24/2024 NSR (HR 65 bpm), 1st AVB ( msec), RBBB, QTc 447 msec 1/17/2024 NSR (HR 65 bpm), 1st AVB ( msec), RBBB 8/9/2023 NSR (HR 63 bpm), 1st AVB ( msec), RBBB [de-identified] : 7/15-8/1/2023 17 days; AVG HR 65 bpm Two episodes of SVT; < 1% PACs. <1 % PVCs 35 symptom episodes c/w NSR (sx listed as taking meds) [de-identified] : 7/1/2023 EF 58% NORMAL LEXISCAN / REST MYOCARDIAL PERFUSION TOMOGRAPHY, WITH NO EVIDENCE FOR ISCHEMIA DURING LEXISCAN INFUSION. [de-identified] : 7/20/2023 EF 58% Normal left atrial size.

## 2024-07-24 NOTE — HISTORY OF PRESENT ILLNESS
[FreeTextEntry1] : Cardio: Dr. Bobby Parker  PCP: Dr. Veto Williamson MD Pulm: Saw Dr. Baez once Ophtho: Dr. Daley  83 yo M with history of HTN, DM, DLD, GERD, prostate cancer (status post seed implant in 2008), c-spine fusion, paroxysmal atrial fibrillation not on anticoagulation (unknown reason) recently admitted for acute onset shortness of breath. During EMS transport, EMT noted the patient to have SVT with HR up 205 bpm on ekg for which he was given 6 and 12 adenosine which did not convert his rhythm but he became hypotensive and was cardioverted with 100J and ketamine with successful cardioversion to NSR. The patient states he was with atrial fibrillation in 1994 but was never started on anticoagulation and nothing was mentioned further about it on follow ups per patient. 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 There is no documentation of the event that took place by EMS so we are unable to determine what arrhythmia the patient had at the time.   Patient presents as follow up. Since his last visit, he was in the hospital and found to have AFlutter. He was cardioverted 12/18/23 and started on Eliquis and Amio. He denies chest pain or palpitations. No syncope.  7/2024 Here for routine follow up visit. Has been feeling well. Denies any chest pain or palpitations. When he was in AFlutter he had significant dyspnea. Denies any recurrent symptoms. Tolerating Amio and Eliquis.

## 2024-07-24 NOTE — ASSESSMENT
[FreeTextEntry1] : # Paroxysmal AFlutter s/p cardioversion 12/18/23 - On Amio 200 mg daily. Rec decreasing dose to 100 mg daily. Advised pt to follow up with pulm. Has ophtho. Has routine labs scheduled for Sept. Will add more labs since pt is on Amio. - Cont Eliquis 5 mg BID for CHADS-VASC score of 4. Denies any s/sx of bleeding. Feels well. Does not meet criteria for low dose.  - Discussed ablation with patient per his request, but for now he would like to defer.   # Paroxysmal "SVT" - Cont Metoprolol Tartrate 25 mg BID - As there is no documented evidence of the arrhythmia occurrence would suggest an internal loop recorder implant prior to discharge. The patient and family wish to DEFER at this time.  # HTN - BP well controlled - Cont Metoprolol Tartrate 25 mg BID - 2g Na diet enforced  I have also advised the patient to go to the nearest emergency room if he experiences any chest pain, dyspnea, syncope, or has any other compelling symptoms.  Follow up in 4 mo

## 2024-11-13 ENCOUNTER — APPOINTMENT (OUTPATIENT)
Dept: ELECTROPHYSIOLOGY | Facility: CLINIC | Age: 84
End: 2024-11-13
Payer: MEDICARE

## 2024-11-13 VITALS
HEART RATE: 63 BPM | HEIGHT: 73 IN | TEMPERATURE: 97.1 F | BODY MASS INDEX: 25.98 KG/M2 | DIASTOLIC BLOOD PRESSURE: 82 MMHG | WEIGHT: 196 LBS | SYSTOLIC BLOOD PRESSURE: 118 MMHG

## 2024-11-13 DIAGNOSIS — I47.10 SUPRAVENTRICULAR TACHYCARDIA, UNSPECIFIED: ICD-10-CM

## 2024-11-13 DIAGNOSIS — I48.92 UNSPECIFIED ATRIAL FLUTTER: ICD-10-CM

## 2024-11-13 DIAGNOSIS — I10 ESSENTIAL (PRIMARY) HYPERTENSION: ICD-10-CM

## 2024-11-13 PROCEDURE — 93000 ELECTROCARDIOGRAM COMPLETE: CPT

## 2024-11-13 PROCEDURE — 99215 OFFICE O/P EST HI 40 MIN: CPT

## 2024-11-13 PROCEDURE — G2211 COMPLEX E/M VISIT ADD ON: CPT

## 2024-11-13 RX ORDER — PENICILLIN V POTASSIUM 500 MG/1
TABLET, FILM COATED ORAL TWICE DAILY
Refills: 0 | Status: ACTIVE | COMMUNITY

## 2024-12-11 NOTE — PHYSICAL THERAPY INITIAL EVALUATION ADULT - SOCIAL CONCERNS
Post-Care Instructions: I reviewed with the patient in detail post-care instructions. Patient is to wear sunprotection, and avoid picking at any of the treated lesions. Pt may apply Vaseline to crusted or scabbing areas
Include Z78.9 (Other Specified Conditions Influencing Health Status) As An Associated Diagnosis?: No
Detail Level: Simple
Anesthesia Type: 1% lidocaine with epinephrine
Consent: The patient's consent was obtained including but not limited to risks of crusting, scabbing, blistering, scarring, darker or lighter pigmentary change, recurrence, incomplete removal and infection.
Medical Necessity Information: It is in your best interest to select a reason for this procedure from the list below. All of these items fulfill various CMS LCD requirements except the new and changing color options.
Medical Necessity Clause: This procedure was medically necessary because the lesions that were treated were:
None

## 2025-04-29 ENCOUNTER — RX RENEWAL (OUTPATIENT)
Age: 85
End: 2025-04-29

## 2025-04-29 RX ORDER — AMIODARONE HYDROCHLORIDE 100 MG/1
100 TABLET ORAL
Qty: 90 | Refills: 1 | Status: ACTIVE | COMMUNITY
Start: 2025-04-29 | End: 1900-01-01

## 2025-06-30 ENCOUNTER — APPOINTMENT (OUTPATIENT)
Dept: ELECTROPHYSIOLOGY | Facility: CLINIC | Age: 85
End: 2025-06-30
Payer: MEDICARE

## 2025-06-30 VITALS
HEIGHT: 73 IN | DIASTOLIC BLOOD PRESSURE: 62 MMHG | SYSTOLIC BLOOD PRESSURE: 122 MMHG | HEART RATE: 63 BPM | WEIGHT: 196 LBS | BODY MASS INDEX: 25.98 KG/M2

## 2025-06-30 PROCEDURE — 93000 ELECTROCARDIOGRAM COMPLETE: CPT

## 2025-06-30 PROCEDURE — 99214 OFFICE O/P EST MOD 30 MIN: CPT
